# Patient Record
Sex: MALE | Race: WHITE | NOT HISPANIC OR LATINO
[De-identification: names, ages, dates, MRNs, and addresses within clinical notes are randomized per-mention and may not be internally consistent; named-entity substitution may affect disease eponyms.]

---

## 2021-05-25 PROBLEM — Z00.00 ENCOUNTER FOR PREVENTIVE HEALTH EXAMINATION: Status: ACTIVE | Noted: 2021-05-25

## 2022-04-12 ENCOUNTER — TRANSCRIPTION ENCOUNTER (OUTPATIENT)
Age: 72
End: 2022-04-12

## 2022-04-15 ENCOUNTER — INPATIENT (INPATIENT)
Facility: HOSPITAL | Age: 72
LOS: 23 days | Discharge: NURSING, REHAB OR LONG-TERM CARE | DRG: 98 | End: 2022-05-09
Attending: PSYCHIATRY & NEUROLOGY | Admitting: HOSPITALIST
Payer: MEDICARE

## 2022-04-15 VITALS
HEART RATE: 77 BPM | WEIGHT: 171.96 LBS | TEMPERATURE: 98 F | SYSTOLIC BLOOD PRESSURE: 142 MMHG | DIASTOLIC BLOOD PRESSURE: 85 MMHG | OXYGEN SATURATION: 96 % | HEIGHT: 69 IN | RESPIRATION RATE: 18 BRPM

## 2022-04-15 DIAGNOSIS — D72.829 ELEVATED WHITE BLOOD CELL COUNT, UNSPECIFIED: ICD-10-CM

## 2022-04-15 DIAGNOSIS — K86.89 OTHER SPECIFIED DISEASES OF PANCREAS: ICD-10-CM

## 2022-04-15 DIAGNOSIS — Z29.9 ENCOUNTER FOR PROPHYLACTIC MEASURES, UNSPECIFIED: ICD-10-CM

## 2022-04-15 DIAGNOSIS — R29.898 OTHER SYMPTOMS AND SIGNS INVOLVING THE MUSCULOSKELETAL SYSTEM: ICD-10-CM

## 2022-04-15 DIAGNOSIS — R74.01 ELEVATION OF LEVELS OF LIVER TRANSAMINASE LEVELS: ICD-10-CM

## 2022-04-15 DIAGNOSIS — R33.9 RETENTION OF URINE, UNSPECIFIED: ICD-10-CM

## 2022-04-15 LAB
ALBUMIN SERPL ELPH-MCNC: 3.5 G/DL — SIGNIFICANT CHANGE UP (ref 3.3–5)
ALP SERPL-CCNC: 100 U/L — SIGNIFICANT CHANGE UP (ref 40–120)
ALT FLD-CCNC: 70 U/L — HIGH (ref 10–45)
ANION GAP SERPL CALC-SCNC: 13 MMOL/L — SIGNIFICANT CHANGE UP (ref 5–17)
ANISOCYTOSIS BLD QL: SLIGHT — SIGNIFICANT CHANGE UP
APTT BLD: 27.2 SEC — LOW (ref 27.5–35.5)
AST SERPL-CCNC: 82 U/L — HIGH (ref 10–40)
BASOPHILS # BLD AUTO: 0 K/UL — SIGNIFICANT CHANGE UP (ref 0–0.2)
BASOPHILS NFR BLD AUTO: 0 % — SIGNIFICANT CHANGE UP (ref 0–2)
BILIRUB SERPL-MCNC: 0.3 MG/DL — SIGNIFICANT CHANGE UP (ref 0.2–1.2)
BLD GP AB SCN SERPL QL: NEGATIVE — SIGNIFICANT CHANGE UP
BUN SERPL-MCNC: 24 MG/DL — HIGH (ref 7–23)
BURR CELLS BLD QL SMEAR: SLIGHT — SIGNIFICANT CHANGE UP
CALCIUM SERPL-MCNC: 8.9 MG/DL — SIGNIFICANT CHANGE UP (ref 8.4–10.5)
CHLORIDE SERPL-SCNC: 106 MMOL/L — SIGNIFICANT CHANGE UP (ref 96–108)
CO2 SERPL-SCNC: 22 MMOL/L — SIGNIFICANT CHANGE UP (ref 22–31)
CREAT SERPL-MCNC: 1.04 MG/DL — SIGNIFICANT CHANGE UP (ref 0.5–1.3)
EGFR: 77 ML/MIN/1.73M2 — SIGNIFICANT CHANGE UP
EOSINOPHIL # BLD AUTO: 0 K/UL — SIGNIFICANT CHANGE UP (ref 0–0.5)
EOSINOPHIL NFR BLD AUTO: 0 % — SIGNIFICANT CHANGE UP (ref 0–6)
GIANT PLATELETS BLD QL SMEAR: PRESENT — SIGNIFICANT CHANGE UP
GLUCOSE SERPL-MCNC: 160 MG/DL — HIGH (ref 70–99)
HCT VFR BLD CALC: 41.1 % — SIGNIFICANT CHANGE UP (ref 39–50)
HGB BLD-MCNC: 13.4 G/DL — SIGNIFICANT CHANGE UP (ref 13–17)
INR BLD: 1.03 — SIGNIFICANT CHANGE UP (ref 0.88–1.16)
LYMPHOCYTES # BLD AUTO: 1.09 K/UL — SIGNIFICANT CHANGE UP (ref 1–3.3)
LYMPHOCYTES # BLD AUTO: 6.7 % — LOW (ref 13–44)
MACROCYTES BLD QL: SLIGHT — SIGNIFICANT CHANGE UP
MANUAL SMEAR VERIFICATION: SIGNIFICANT CHANGE UP
MCHC RBC-ENTMCNC: 28.2 PG — SIGNIFICANT CHANGE UP (ref 27–34)
MCHC RBC-ENTMCNC: 32.6 GM/DL — SIGNIFICANT CHANGE UP (ref 32–36)
MCV RBC AUTO: 86.5 FL — SIGNIFICANT CHANGE UP (ref 80–100)
MONOCYTES # BLD AUTO: 0 K/UL — SIGNIFICANT CHANGE UP (ref 0–0.9)
MONOCYTES NFR BLD AUTO: 0 % — LOW (ref 2–14)
NEUTROPHILS # BLD AUTO: 15.21 K/UL — HIGH (ref 1.8–7.4)
NEUTROPHILS NFR BLD AUTO: 92.4 % — HIGH (ref 43–77)
NEUTS BAND # BLD: 0.9 % — SIGNIFICANT CHANGE UP (ref 0–8)
OVALOCYTES BLD QL SMEAR: SLIGHT — SIGNIFICANT CHANGE UP
PLAT MORPH BLD: NORMAL — SIGNIFICANT CHANGE UP
PLATELET # BLD AUTO: 299 K/UL — SIGNIFICANT CHANGE UP (ref 150–400)
POIKILOCYTOSIS BLD QL AUTO: SIGNIFICANT CHANGE UP
POTASSIUM SERPL-MCNC: 4.1 MMOL/L — SIGNIFICANT CHANGE UP (ref 3.5–5.3)
POTASSIUM SERPL-SCNC: 4.1 MMOL/L — SIGNIFICANT CHANGE UP (ref 3.5–5.3)
PROT SERPL-MCNC: 6.1 G/DL — SIGNIFICANT CHANGE UP (ref 6–8.3)
PROTHROM AB SERPL-ACNC: 12.3 SEC — SIGNIFICANT CHANGE UP (ref 10.5–13.4)
RBC # BLD: 4.75 M/UL — SIGNIFICANT CHANGE UP (ref 4.2–5.8)
RBC # FLD: 13.2 % — SIGNIFICANT CHANGE UP (ref 10.3–14.5)
RBC BLD AUTO: ABNORMAL
RH IG SCN BLD-IMP: NEGATIVE — SIGNIFICANT CHANGE UP
SMUDGE CELLS # BLD: PRESENT — SIGNIFICANT CHANGE UP
SODIUM SERPL-SCNC: 141 MMOL/L — SIGNIFICANT CHANGE UP (ref 135–145)
SPHEROCYTES BLD QL SMEAR: SLIGHT — SIGNIFICANT CHANGE UP
WBC # BLD: 16.3 K/UL — HIGH (ref 3.8–10.5)
WBC # FLD AUTO: 16.3 K/UL — HIGH (ref 3.8–10.5)

## 2022-04-15 PROCEDURE — 99233 SBSQ HOSP IP/OBS HIGH 50: CPT

## 2022-04-15 PROCEDURE — 99223 1ST HOSP IP/OBS HIGH 75: CPT | Mod: GC

## 2022-04-15 RX ORDER — ACETAMINOPHEN 500 MG
650 TABLET ORAL EVERY 6 HOURS
Refills: 0 | Status: DISCONTINUED | OUTPATIENT
Start: 2022-04-15 | End: 2022-05-09

## 2022-04-15 RX ORDER — SENNA PLUS 8.6 MG/1
1 TABLET ORAL AT BEDTIME
Refills: 0 | Status: DISCONTINUED | OUTPATIENT
Start: 2022-04-15 | End: 2022-05-09

## 2022-04-15 RX ORDER — TAMSULOSIN HYDROCHLORIDE 0.4 MG/1
0.4 CAPSULE ORAL AT BEDTIME
Refills: 0 | Status: DISCONTINUED | OUTPATIENT
Start: 2022-04-15 | End: 2022-05-09

## 2022-04-15 RX ORDER — ENOXAPARIN SODIUM 100 MG/ML
40 INJECTION SUBCUTANEOUS EVERY 24 HOURS
Refills: 0 | Status: DISCONTINUED | OUTPATIENT
Start: 2022-04-15 | End: 2022-05-09

## 2022-04-15 RX ORDER — POLYETHYLENE GLYCOL 3350 17 G/17G
17 POWDER, FOR SOLUTION ORAL DAILY
Refills: 0 | Status: DISCONTINUED | OUTPATIENT
Start: 2022-04-15 | End: 2022-05-09

## 2022-04-15 RX ADMIN — SENNA PLUS 1 TABLET(S): 8.6 TABLET ORAL at 22:56

## 2022-04-15 RX ADMIN — ENOXAPARIN SODIUM 40 MILLIGRAM(S): 100 INJECTION SUBCUTANEOUS at 22:53

## 2022-04-15 RX ADMIN — POLYETHYLENE GLYCOL 3350 17 GRAM(S): 17 POWDER, FOR SOLUTION ORAL at 22:56

## 2022-04-15 RX ADMIN — TAMSULOSIN HYDROCHLORIDE 0.4 MILLIGRAM(S): 0.4 CAPSULE ORAL at 22:54

## 2022-04-15 NOTE — CONSULT NOTE ADULT - SUBJECTIVE AND OBJECTIVE BOX
**NEUROLOGY CONSULT NOTE** incomplete    HPI: 71 M with no past medical history, not on any medications, transferred here from [facility] for bilateral lower extremity weakness, with concern for Transverse Myelitis vs Guillan [place], for plasma exchange. Patient's story dates back to week and a half ago, when he went to [place] for a vacation, developed nausea, vomiting, diarrhea and weakness, accompanied by abd distension and difficulty urinating for which he went to [facility]. There, he was found to be in urosepsis, complicated by urinary obstruction for which Marie Catheter was placed. On day prior to planned discharge 4/12, he started to develop severe paresthesia, numbness, and weakness of bilateral lower extremities. He underwent abdominal imaging and MRI of spine at [facility], and was found to have pancreatic lesion at pancreatic head, and MRI thoracic spine consistent with transverse myelitis. He then was given high dose steroids for three days, which provided minimal improvement of symptoms. As a result, patient transferred to [facility] for possible plasma exchange.   Of note-patient travelled to [place] and [place] in November, for which he received a series of immunizations that he does not recall.     Currently, he is endorsing weakness, tingling, and numbness of his bilateral lower extremities up until the point of his umbilicus. He is denying any other symptoms of his UE, notes no weakness, paresthesias, tingling. Currently only complaint is abdominal pain and constipation.      Relevant imaging/studies from [facility]:  CT AP: Ill defined low density lesion within pancreatic head represent a benign or malignant neoplasm. Measures 2.2 x 2.4 x 3.1 cm which may represent a benign or malignant neoplasm, pancreatic lesion partially compresses the portal vein and superior aspect of SMV. Bowel: Mild diffuse wall thickening of the ascending transverse and upper half of the descending colon which may relate to possible mild non specific colitis.   MRI Thoracic:  Borderline abn signal within the central cord substance throughout entire length of thoracic spine could reflect transverse myelitis.   ESR- 30 CRP- 14.2  CSF total protein 80, CSF PCR negative, others pending    T(C): 36.6 (04-15-22 @ 19:15), Max: 36.6 (04-15-22 @ 19:15)  HR: 77 (04-15-22 @ 19:15) (77 - 77)  BP: 142/85 (04-15-22 @ 19:15) (142/85 - 142/85)  RR: 18 (04-15-22 @ 19:15) (18 - 18)  SpO2: 96% (04-15-22 @ 19:15) (96% - 96%)    PAST MEDICAL & SURGICAL HISTORY:      FAMILY HISTORY:  Family history of breast cancer in mother (Sibling)    FH: multiple myeloma (Mother)        SOCIAL HISTORY:   Patient lives with *** at ***.   Smoking status:  Drinking:  Drug Use:     ROS: ***  Constitutional: No fever, weight loss or fatigue  Eyes: No eye pain, visual disturbances, or discharge  ENMT:  No difficulty hearing, tinnitus; No sinus or throat pain  Neck: No pain or stiffness  Respiratory: No cough, wheezing, chills or hemoptysis  Cardiovascular: No chest pain, palpitations, shortness of breath, or leg swelling  Gastrointestinal: No abdominal pain. No nausea, vomiting or hematemesis; No diarrhea or constipation. Nohematochezia.  Genitourinary: No dysuria, frequency, hematuria or incontinence  Neurological: As per HPI  Skin: No itching, burning, rashes or lesions   Endocrine: No heat or cold intolerance; No hair loss  Musculoskeletal: No joint pain or swelling; No muscle, back or extremity pain  Heme/Lymph: No easy bruising or bleeding gums    MEDICATIONS  (STANDING):  enoxaparin Injectable 40 milliGRAM(s) SubCutaneous every 24 hours  polyethylene glycol 3350 17 Gram(s) Oral daily  senna 1 Tablet(s) Oral at bedtime  tamsulosin 0.4 milliGRAM(s) Oral at bedtime    MEDICATIONS  (PRN):  acetaminophen     Tablet .. 650 milliGRAM(s) Oral every 6 hours PRN Temp greater or equal to 38C (100.4F), Mild Pain (1 - 3)    Allergies    No Known Allergies    Intolerances      Vital Signs Last 24 Hrs  T(C): 36.6 (15 Apr 2022 19:15), Max: 36.6 (15 Apr 2022 19:15)  T(F): 97.8 (15 Apr 2022 19:15), Max: 97.8 (15 Apr 2022 19:15)  HR: 77 (15 Apr 2022 19:15) (77 - 77)  BP: 142/85 (15 Apr 2022 19:15) (142/85 - 142/85)  BP(mean): --  RR: 18 (15 Apr 2022 19:15) (18 - 18)  SpO2: 96% (15 Apr 2022 19:15) (96% - 96%)    Physical exam:  Constitutional: No acute distress, conversant  Eyes: Anicteric sclerae, moist conjunctivae, see below for CNs  Neck: trachea midline, FROM, supple, no thyromegaly or lymphadenopathy  Cardiovascular: Regular rate and rhythm, no murmurs, rubs, or gallops. No carotid bruits.   Pulmonary: Anterior breath sounds clear bilaterally, no crackles or wheezing. No use of accessory muscles  GI: tender to palpation in luq-distended.  Extremities: Radial and DP pulses +2, no edema    Neurologic:  -Mental status: Awake, alert, oriented to person, place, and time. Speech is fluent with intact naming, repetition, and comprehension, no dysarthria. Recent and remote memory intact. Follows commands. Attention/concentration intact. Fund of knowledge appropriate.  -Cranial nerves:   II: Visual fields are full to confrontation.  III, IV, VI: Extraocular movements are intact without nystagmus. Pupils equally round and reactive to light  V:  Facial sensation V1-V3 equal and intact   VII: Face is symmetric with normal eye closure and smile  VIII: Hearing is grossly intact  IX, X: Uvula is midline and soft palate rises symmetrically  XI: Head turning and shoulder shrug are intact.  XII: Tongue protrudes midline  Motor: Normal bulk and tone. No pronator drift. Strength bilateral upper extremity 5/5, bilateral lower extremities 5/5.  Rapid alternating movements intact and symmetric  Sensation: Intact to light touch bilaterally. No neglect or extinction on double simultaneous testing.  Coordination: No dysmetria on finger-to-nose and heel-to-shin bilaterally  Reflexes: Downgoing toes bilaterally   Gait: Narrow gait and steady    NIHSS: **** ASPECT Score: ***** ICH Score: ****** (GCS)    Fingerstick Blood Glucose: CAPILLARY BLOOD GLUCOSE        LABS:                        13.4   16.30 )-----------( 299      ( 15 Apr 2022 20:04 )             41.1     04-15    141  |  106  |  24<H>  ----------------------------<  160<H>  4.1   |  22  |  1.04    Ca    8.9      15 Apr 2022 20:04    TPro  6.1  /  Alb  3.5  /  TBili  0.3  /  DBili  x   /  AST  82<H>  /  ALT  70<H>  /  AlkPhos  100  04-15    PT/INR - ( 15 Apr 2022 20:04 )   PT: 12.3 sec;   INR: 1.03          PTT - ( 15 Apr 2022 20:04 )  PTT:27.2 sec          RADIOLOGY & ADDITIONAL STUDIES:      -----------------------------------------------------------------------------------------------------------------  IV-tPA (Y/N):    ***                              Bolus time:    Alteplase Dose Verification w/ RN:  Reason IV-tPA not given: ***    **NEUROLOGY CONSULT NOTE**    Of note: history obtained by patient is inconsistent with history in chart from [facility]    HPI: 71 M with no past medical history, not on any medications, transferred here from [facility] for bilateral lower extremity weakness, with concern for Transverse Myelitis vs Guillan [place], for plasma exchange. Patient states he was in [place] for vacation on 4/6 and developed "food poisoning" symptoms like nausea, vomiting, diarrhea, generalized fatigue. Pt did not seek medical care during this time, wanted to recuperate at home. By 4/10 patient felt okay. On 4/11, pt states he developed abdominal distension and difficulty urinating for which he went to [facility] to get evaluated. Pt was found to be in urosepsis c/b urinary obstruction for which nation catheter was placed. Pt admitted for observation on 4/12, was planned for discharge on that day but started to develop numbness in both feet early AM. Initially patient stated his weakness started on 4/15, however, when asked again states weakness started on 4/13. Neurology (Dr. Stout) was consulted on 4/13 as pt could not lift legs off the bed. At that time, LE were 2/5 in strength, hyperreflexic in b/l LE, no sensation at T10 level. STAT MRI T and L +/- contrast obtained on 4/13 and demonstrated transverse myelitis in thoracic spine, was subsequently started on high dose steroids. LP performed on 4/13 as well (see results below). On 4/14 pt was unable to ambulate, worsened leg weakness and paresthesias noted in chart. On 4/15, patient showed mild improvement in receding sensory level from T10 - L1-2 and in leg strength per neurology note. As pt had minimal improvement of symptoms s/p 3 days of high dose steroids, pt transferred to [facility] for possible plasma exchange.     Of note - Pt underwent CT abdomen and Chest - found to have pancreatic lesion at pancreatic head. Also found to have enhancing lesion in left sacroiliac junction s/f possible metastatic lesion. Patient also travelled to [place] and [place] in November - received series of immunizations that he does not recall.     Currently, pt is endorsing weakness, tingling, and numbness of his bilateral lower extremities up until the point of his umbilicus. He is denying any other symptoms of his UE, notes no weakness, paresthesias, tingling. Currently only complaint is abdominal pain and constipation.     Relevant imaging/studies from [facility]:  CT AP: Ill defined low density lesion within pancreatic head represent a benign or malignant neoplasm. Measures 2.2 x 2.4 x 3.1 cm which may represent a benign or malignant neoplasm, pancreatic lesion partially compresses the portal vein and superior aspect of SMV. Bowel: Mild diffuse wall thickening of the ascending transverse and upper half of the descending colon which may relate to possible mild non specific colitis.   MRI Thoracic:  Borderline abn signal within the central cord substance throughout entire length of thoracic spine could reflect transverse myelitis.   ESR- 30 CRP- 14.2  CSF Studies - .9, RBC 5.6, total cells 100, 3% Neutrophils, 82% Lymphocytes, 11% monocytes, total protein 80, glucose 56, negative gram stain others pending    T(C): 36.6 (04-15-22 @ 19:15), Max: 36.6 (04-15-22 @ 19:15)  HR: 77 (04-15-22 @ 19:15) (77 - 77)  BP: 142/85 (04-15-22 @ 19:15) (142/85 - 142/85)  RR: 18 (04-15-22 @ 19:15) (18 - 18)  SpO2: 96% (04-15-22 @ 19:15) (96% - 96%)    PAST MEDICAL & SURGICAL HISTORY:      FAMILY HISTORY:  Family history of breast cancer in mother (Sibling)    FH: multiple myeloma (Mother)    SOCIAL HISTORY:   Patient lives with family  Smoking status: lifetime nonsmoker  Drinking: occasional use  Drug Use: denies    ROS:   Constitutional: No fever, weight loss or fatigue  Eyes: No eye pain, visual disturbances, or discharge  ENMT:  No difficulty hearing, tinnitus; No sinus or throat pain  Neck: No pain or stiffness  Respiratory: No cough, wheezing, chills or hemoptysis  Cardiovascular: No chest pain, palpitations, shortness of breath, or leg swelling  Gastrointestinal: No nausea, vomiting or hematemesis; No diarrhea. Nohematochezia.  Genitourinary: + incontinence, nation in place  Neurological: As per HPI    MEDICATIONS  (STANDING):  enoxaparin Injectable 40 milliGRAM(s) SubCutaneous every 24 hours  polyethylene glycol 3350 17 Gram(s) Oral daily  senna 1 Tablet(s) Oral at bedtime  tamsulosin 0.4 milliGRAM(s) Oral at bedtime    MEDICATIONS  (PRN):  acetaminophen     Tablet .. 650 milliGRAM(s) Oral every 6 hours PRN Temp greater or equal to 38C (100.4F), Mild Pain (1 - 3)    Allergies    No Known Allergies    Intolerances      Vital Signs Last 24 Hrs  T(C): 36.6 (15 Apr 2022 19:15), Max: 36.6 (15 Apr 2022 19:15)  T(F): 97.8 (15 Apr 2022 19:15), Max: 97.8 (15 Apr 2022 19:15)  HR: 77 (15 Apr 2022 19:15) (77 - 77)  BP: 142/85 (15 Apr 2022 19:15) (142/85 - 142/85)  BP(mean): --  RR: 18 (15 Apr 2022 19:15) (18 - 18)  SpO2: 96% (15 Apr 2022 19:15) (96% - 96%)    Physical exam:  Constitutional: No acute distress, conversant  Eyes: Anicteric sclerae, moist conjunctivae, see below for CNs  Neck: trachea midline, FROM, supple, no thyromegaly or lymphadenopathy  Cardiovascular: Regular rate and rhythm, no murmurs, rubs, or gallops. No carotid bruits.   Pulmonary: Anterior breath sounds clear bilaterally, no crackles or wheezing. No use of accessory muscles  GI: tender to palpation in LUQ, distended  Extremities: Radial and DP pulses +2, no edema    Neurologic:  -Mental status: Awake, alert, oriented to person, place, and time. Speech is fluent with intact naming, repetition, and comprehension, no dysarthria. Recent and remote memory intact. Follows commands. Attention/concentration intact. Fund of knowledge appropriate.  -Cranial nerves:   II: Visual fields are full to confrontation.  III, IV, VI: Extraocular movements are intact without nystagmus. Pupils equally round and reactive to light  V:  Facial sensation V1-V3 equal and intact   VII: Face is symmetric with normal eye closure and smile  VIII: Hearing is grossly intact  IX, X: Uvula is midline and soft palate rises symmetrically  XI: Head turning and shoulder shrug are intact.  XII: Tongue protrudes midline  Motor: No pronator drift. Strength bilateral upper extremity 5/5. Left hip flexor/extensor, knee flexor/extensor & ankle dorsiflexion 1/5, plantar flexion 2-/5, occasional twitching. Right hip flexor/extensor, knee flexor/extensor & ankle dorsiflexion 1-/5, plantar flexion 1/5  Rapid alternating movements intact and symmetric in b/l UE  Sensation: Intact to light touch bilaterally on UE. Vibration and sensation to pinprick intact in b/l UE. Decreased sensation starting at T9 level, almost no sensation at T11 level to light touch. Right LE 0% sensation to light touch, no sensation to pinprick nor vibration. Left LE 1-3% sensation to light touch (compared to UE), dull sensation to pinprick, none to vibration.   Coordination: No dysmetria on finger-to-nose b/l, unable to perform heel-to-shin bilaterally 2/2 weakness  Reflexes: + Babinksi bilaterally. Reflexes in b/l LE 3/4 throughout, UE 2/4.     Fingerstick Blood Glucose: CAPILLARY BLOOD GLUCOSE        LABS:                        13.4   16.30 )-----------( 299      ( 15 Apr 2022 20:04 )             41.1     04-15    141  |  106  |  24<H>  ----------------------------<  160<H>  4.1   |  22  |  1.04    Ca    8.9      15 Apr 2022 20:04    TPro  6.1  /  Alb  3.5  /  TBili  0.3  /  DBili  x   /  AST  82<H>  /  ALT  70<H>  /  AlkPhos  100  04-15    PT/INR - ( 15 Apr 2022 20:04 )   PT: 12.3 sec;   INR: 1.03          PTT - ( 15 Apr 2022 20:04 )  PTT:27.2 sec      RADIOLOGY & ADDITIONAL STUDIES:      **NEUROLOGY CONSULT NOTE**    Of note: history obtained by patient is inconsistent with history in chart from [facility]    HPI: 71 M with no past medical history, not on any medications, transferred here from [facility] for bilateral lower extremity weakness, with concern for Transverse Myelitis vs Guillan [place], for plasma exchange. Patient states he was in [place] for vacation on 4/6 and developed "food poisoning" symptoms like nausea, vomiting, diarrhea, generalized fatigue. Pt did not seek medical care during this time, wanted to recuperate at home. By 4/10 patient felt okay. On 4/11, pt states he developed abdominal distension and difficulty urinating for which he went to [facility] to get evaluated. Pt was found to be in urosepsis c/b urinary obstruction for which nation catheter was placed. Pt admitted for observation on 4/12, was planned for discharge on that day but started to develop numbness in both feet early AM. Initially patient stated his weakness started on 4/15, however, when asked again states weakness started on 4/13. Neurology (Dr. Stout) was consulted on 4/13 as pt could not lift legs off the bed. At that time, LE were 2/5 in strength, hyperreflexic in b/l LE, no sensation at T10 level. STAT MRI T and L +/- contrast obtained on 4/13 and demonstrated transverse myelitis in thoracic spine, was subsequently started on high dose steroids. LP performed on 4/13 as well (see results below). On 4/14 pt was unable to ambulate, worsened leg weakness and paresthesias noted in chart. On 4/15, patient showed mild improvement in receding sensory level from T10 - L1-2 and in leg strength per neurology note. As pt had minimal improvement of symptoms s/p 3 days of high dose steroids, pt transferred to [facility] for possible plasma exchange.     Of note - Pt underwent CT abdomen and Chest - found to have pancreatic lesion at pancreatic head. Also found to have enhancing lesion in left sacroiliac junction s/f possible metastatic lesion. Patient also travelled to [place] and [place] in November - received series of immunizations that he does not recall.     Currently, pt is endorsing weakness, tingling, and numbness of his bilateral lower extremities up until the point of his umbilicus. He is denying any other symptoms of his UE, notes no weakness, paresthesias, tingling. Currently only complaint is abdominal pain and constipation.     Relevant imaging/studies from [facility]:  CT AP: Ill defined low density lesion within pancreatic head represent a benign or malignant neoplasm. Measures 2.2 x 2.4 x 3.1 cm which may represent a benign or malignant neoplasm, pancreatic lesion partially compresses the portal vein and superior aspect of SMV. Bowel: Mild diffuse wall thickening of the ascending transverse and upper half of the descending colon which may relate to possible mild non specific colitis.   MRI Thoracic:  Borderline abn signal within the central cord substance throughout entire length of thoracic spine could reflect transverse myelitis.   ESR- 30 CRP- 14.2  CSF Studies - .9, RBC 5.6, total cells 100, 3% Neutrophils, 82% Lymphocytes, 11% monocytes, total protein 80, glucose 56, negative gram stain others pending    T(C): 36.6 (04-15-22 @ 19:15), Max: 36.6 (04-15-22 @ 19:15)  HR: 77 (04-15-22 @ 19:15) (77 - 77)  BP: 142/85 (04-15-22 @ 19:15) (142/85 - 142/85)  RR: 18 (04-15-22 @ 19:15) (18 - 18)  SpO2: 96% (04-15-22 @ 19:15) (96% - 96%)    PAST MEDICAL & SURGICAL HISTORY:      FAMILY HISTORY:  Family history of breast cancer in mother (Sibling)    FH: multiple myeloma (Mother)    SOCIAL HISTORY:   Patient lives with family  Smoking status: lifetime nonsmoker  Drinking: occasional use  Drug Use: denies    ROS:   Constitutional: No fever, weight loss or fatigue  Eyes: No eye pain, visual disturbances, or discharge  ENMT:  No difficulty hearing, tinnitus; No sinus or throat pain  Neck: No pain or stiffness  Respiratory: No cough, wheezing, chills or hemoptysis  Cardiovascular: No chest pain, palpitations, shortness of breath, or leg swelling  Gastrointestinal: No nausea, vomiting or hematemesis; No diarrhea. Nohematochezia.  Genitourinary: + incontinence, nation in place  Neurological: As per HPI    MEDICATIONS  (STANDING):  enoxaparin Injectable 40 milliGRAM(s) SubCutaneous every 24 hours  polyethylene glycol 3350 17 Gram(s) Oral daily  senna 1 Tablet(s) Oral at bedtime  tamsulosin 0.4 milliGRAM(s) Oral at bedtime    MEDICATIONS  (PRN):  acetaminophen     Tablet .. 650 milliGRAM(s) Oral every 6 hours PRN Temp greater or equal to 38C (100.4F), Mild Pain (1 - 3)    Allergies    No Known Allergies    Intolerances      Vital Signs Last 24 Hrs  T(C): 36.6 (15 Apr 2022 19:15), Max: 36.6 (15 Apr 2022 19:15)  T(F): 97.8 (15 Apr 2022 19:15), Max: 97.8 (15 Apr 2022 19:15)  HR: 77 (15 Apr 2022 19:15) (77 - 77)  BP: 142/85 (15 Apr 2022 19:15) (142/85 - 142/85)  BP(mean): --  RR: 18 (15 Apr 2022 19:15) (18 - 18)  SpO2: 96% (15 Apr 2022 19:15) (96% - 96%)    Physical exam:  Constitutional: No acute distress, conversant  Eyes: Anicteric sclerae, moist conjunctivae, see below for CNs  Neck: trachea midline, FROM, supple, no thyromegaly or lymphadenopathy  Cardiovascular: Regular rate and rhythm, no murmurs, rubs, or gallops. No carotid bruits.   Pulmonary: Anterior breath sounds clear bilaterally, no crackles or wheezing. No use of accessory muscles  GI: tender to palpation in LUQ, distended  Extremities: Radial and DP pulses +2, no edema    Neurologic:  -Mental status: Awake, alert, oriented to person, place, and time. Speech is fluent with intact naming, repetition, and comprehension, no dysarthria. Recent and remote memory intact. Follows commands. Attention/concentration intact. Fund of knowledge appropriate.  -Cranial nerves:   II: Visual fields are full to confrontation.  III, IV, VI: Extraocular movements are intact without nystagmus. Pupils equally round and reactive to light  V:  Facial sensation V1-V3 equal and intact   VII: Face is symmetric with normal eye closure and smile  VIII: Hearing is grossly intact  IX, X: Uvula is midline and soft palate rises symmetrically  XI: Head turning and shoulder shrug are intact.  XII: Tongue protrudes midline  Motor: No pronator drift. Strength bilateral upper extremity 5/5. Left hip flexor/extensor, knee flexor/extensor & ankle dorsiflexion 1/5, plantar flexion 2-/5, occasional twitching. Right hip flexor/extensor, knee flexor/extensor & ankle dorsiflexion 1-/5, plantar flexion 1/5  Rapid alternating movements intact and symmetric in b/l UE  Sensation: Intact to light touch bilaterally on UE. Vibration and sensation to pinprick intact in b/l UE. Decreased sensation starting at T10/11 level. Right LE 0% sensation to light touch, no sensation to pinprick nor vibration. Left LE 1-3% sensation to light touch (compared to UE), dull sensation to pinprick, none to vibration.   Coordination: Mild dysmetria on finger-to-nose b/l; unable to perform heel-to-shin bilaterally 2/2 weakness  Reflexes: + Babinksi bilaterally. Reflexes in b/l LE 3+ throughout, UE 3+ with + Diaz's sign     Fingerstick Blood Glucose: CAPILLARY BLOOD GLUCOSE        LABS:                        13.4   16.30 )-----------( 299      ( 15 Apr 2022 20:04 )             41.1     04-15    141  |  106  |  24<H>  ----------------------------<  160<H>  4.1   |  22  |  1.04    Ca    8.9      15 Apr 2022 20:04    TPro  6.1  /  Alb  3.5  /  TBili  0.3  /  DBili  x   /  AST  82<H>  /  ALT  70<H>  /  AlkPhos  100  04-15    PT/INR - ( 15 Apr 2022 20:04 )   PT: 12.3 sec;   INR: 1.03          PTT - ( 15 Apr 2022 20:04 )  PTT:27.2 sec      RADIOLOGY & ADDITIONAL STUDIES:

## 2022-04-15 NOTE — H&P ADULT - ATTENDING COMMENTS
#LE weakness: s/p MRI spine and LP at [facility] with findings c/f transverse myelitis s/p steroids. Paresthesia/weakness extends to umbilicus- Per pt these symptoms have not progressed for 4 days and feels improved. Neuro following, unclear etiology- TM vs infarct vs paraneoplastic vs GB. F/up repeat MRI, hold steroids for now per neuro- c/w rmf neuro checks, collateral from [facility]. If symptoms progress/worsening neuro exam- stat neuro eval and consider icu consult for further monitoring.     #Pancreatic mass: Collateral from [facility]. No abd pain/tenderness, mild transaminitis. Consider mri abdomen/GI eval

## 2022-04-15 NOTE — H&P ADULT - NSHPPHYSICALEXAM_GEN_ALL_CORE
.  VITAL SIGNS:  T(C): 36.6 (04-15-22 @ 19:15), Max: 36.6 (04-15-22 @ 19:15)  T(F): 97.8 (04-15-22 @ 19:15), Max: 97.8 (04-15-22 @ 19:15)  HR: 77 (04-15-22 @ 19:15) (77 - 77)  BP: 142/85 (04-15-22 @ 19:15) (142/85 - 142/85)  BP(mean): --  RR: 18 (04-15-22 @ 19:15) (18 - 18)  SpO2: 96% (04-15-22 @ 19:15) (96% - 96%)  Wt(kg): --    PHYSICAL EXAM:    Head: NC/AT  Eyes: PERRL, EOMI, clear conjunctiva  ENT: no nasal discharge; uvula midline, no oropharyngeal erythema or exudates; MMM  Neck: supple; no JVD or thyromegaly  Respiratory: CTA B/L; no W/R/R, no retractions  Cardiac: +S1/S2; RRR; no M/R/G; PMI non-displaced  Gastrointestinal:  tender to palpation in luq-distended.  Extremities: WWP, no clubbing or cyanosis; no peripheral edema  Vascular: 2+ radial, femoral, DP/PT pulses B/L  Dermatologic: skin warm, dry and intact; no rashes, wounds, or scars  Lymphatic: no submandibular or cervical LAD  Neurologic: AAOx3; CNII-XII grossly intact; LE strength 0/5, sensation in tact L > R. UE strength 5/5, sensation intact bilaterally.   Psychiatric: affect and characteristics of appearance, verbalizations, behaviors are appropriate

## 2022-04-15 NOTE — H&P ADULT - PROBLEM SELECTOR PLAN 5
Patient initially to [facility] with urosepsis c/b urinary retention, likely 2/2 neurological process. Marie placed at [facility].   -continue with flomax 0.4 QHS  -neuro workup as above.

## 2022-04-15 NOTE — CONSULT NOTE ADULT - ASSESSMENT
71 M with no past medical history, not on any medications, transferred here from [facility] for bilateral lower extremity weakness, with concern for transverse myelitis for plasma exchange.     Transverse myelitis demonstrated throughout thoracic spine on MRI from [facility]. Will need to r/o metastatic lesion to spinal cord (although rare), r/o spinal cord infarct.     Recommend:  - Repeat urgent MRI thoracic and lumbar spine with contrast, add diffusion sequence to r/o spinal cord infarct.  - Decision for further treatment with high dose steroids to be confirmed tomorrow with Dr. Corbett  - hold on plan for plasma exchange for now  - start q8h general neuro checks and vitals   - follow up CSF studies from [facility]  - further recs tomorrow AM     Discussed case with Neurology Attending Dr. Corbett 71 M with no past medical history, not on any medications, transferred here from [facility] for bilateral lower extremity weakness, with concern for transverse myelitis for plasma exchange.     Transverse myelitis demonstrated throughout thoracic spine on MRI from [facility]. Will need to r/o metastatic lesion to spinal cord (although rare), r/o spinal cord infarct.     Recommend:  - MRI brain and C spine with contrast   - Decision for further treatment with high dose steroids to be confirmed tomorrow with Dr. Corbett  - hold on plan for plasma exchange for now  - start q8h general neuro checks and vitals   - follow up CSF studies from [facility]  - further recs tomorrow AM     Discussed case with Neurology Attending Dr. Corbett

## 2022-04-15 NOTE — H&P ADULT - PROBLEM SELECTOR PLAN 1
Given history of diarrhea two weeks prior to LE weakness, ddx includes GBS versus transverse myelitis-imaging consistent with transverse myelitis. Lumbar tap done at [facility]-for which studies pending. There, ESR and CRP elevated. Patient finished three days of high dose steroids with minimal improvement in symptoms.   -neuro consulted-follow recs-possible PLEX?  -neuro check q4  -will do bedside swallow  -respiratory status stable  -follow autoimmune workup Given history of diarrhea two weeks prior to LE weakness, ddx includes GBS versus transverse myelitis-imaging consistent with transverse myelitis. Lumbar tap done at [facility]-for which studies pending. There, ESR and CRP elevated. Patient finished three days of high dose steroids with minimal improvement in symptoms.   -neuro consulted-follow recs  -willh old off on steroids until evaluation by neurology tomorrow AM  -lumbar tap done at [facility]-will follow up results  -neuro check q8  -will do bedside swallow   -follow autoimmune workup Given history of diarrhea two weeks prior to LE weakness, ddx includes GBS versus transverse myelitis-imaging consistent with transverse myelitis. Lumbar tap done at [facility]-for which studies pending. There, ESR and CRP elevated. Patient finished three days of high dose steroids with minimal improvement in symptoms.   -neuro consulted-follow recs  -willh old off on steroids until evaluation by neurology tomorrow AM  -lumbar tap done at [facility]-will follow up results  -neuro check q8  -follow up repeat MRI  -follow up AI workup

## 2022-04-15 NOTE — H&P ADULT - PROBLEM SELECTOR PLAN 3
Patient initially to [facility] with urosepsis c/b urinary retention, likely 2/2 neurological process. Marie placed at [facility].   -continue with flomax 0.4 QHS  -neuro workup as above. Presented with WBC of 16, elevated at [facility] as well. Could be 2/2 infection-history of UTI at [facility] treated with 5 day course of CTX, denying any complaints. No cough, no persistent GI symptoms, no diarrhea, no vomiting. Could be 2/2 reactive process given neurological symptomology.   -follow UA  -CXR at [facility] clear without infiltrates  -Patient currently constipated-unlikely to be GI source. Presented with WBC of 16, elevated at [facility] as well. Could be 2/2 infection-history of UTI at [facility] treated with 5 day course of CTX, denying any complaints. No cough, no persistent GI symptoms, no diarrhea, no vomiting. Could be 2/2 reactive process given neurological symptomology. Could also be 2/2 prednisone.   -follow UA  -CXR at [facility] clear without infiltrates  -Patient currently constipated-unlikely to be GI source.

## 2022-04-15 NOTE — H&P ADULT - HISTORY OF PRESENT ILLNESS
71 M with no past medical history, not on any medications, transferred here from [facility] for bilateral lower extremity weakness, with concern for Transverse Myelitis vs Guillan [place], for plasma exchange. Patient's story dates back to week and a half ago, when he went to [place] for a vacation, developed nausea, vomiting, diarrhea and weakness, accompanied by abd distension and difficulty urinating for which he went to [facility]. There, he was found to be in urosepsis, complicated by urinary obstruction for which Marie Catheter was placed. On day prior to planned discharge 4/12, he started to develop severe paresthesia, numbness, and weakness of bilateral lower extremities. He underwent abdominal imaging and MRI of spine at [facility], and was found to have pancreatic lesion at pancreatic head, and MRI thoracic spine consistent with transverse myelitis. He then was given high dose steroids for three days, which provided minimal improvement of symptoms. As a result, patient transferred to [facility] for possible plasma exchange.   Of note-patient travelled to [place] and [place] in November, for which he received a series of immunizations that he does not recall.     Currently, he is endorsing weakness, tingling, and numbness of his bilateral lower extremities up until the point of his umbilicus. He is denying any other symptoms of his UE, notes no weakness, paresthesias, tingling. Currently only complaint is abdominal pain and constipation.      Relevant imaging/studies from [facility]:  CT AP: Ill defined low density lesion within pancreatic head represent a benign or malignant neoplasm. Measures 2.2 x 2.4 x 3.1 cm which may represent a benign or malignant neoplasm, pancreatic lesion partially compresses the portal vein and superior aspect of SMV. Bowel: Mild diffuse wall thickening of the ascending transverse and upper half of the descending colon which may relate to possible mild non specific colitis.   MRI Thoracic:  Borderline abn signal within the central cord substance throughout entire length of thoracic spine could reflect transverse myelitis.   ESR- 30 CRP- 14.2  CSF total protein 80, CSF PCR negative, others pending

## 2022-04-15 NOTE — H&P ADULT - PROBLEM SELECTOR PLAN 4
F-none  E-replete PRN  N-pending dysphagia  DVT-lovenox 40SQ Elevation in AST ALT, could be 2/2 pancreatic mass versus autoimmune etiology. No significant drinking or drug history. No tenderness on exam, no organomegaly.   -follow RUQ US  -follow malignancy workup as above.

## 2022-04-15 NOTE — H&P ADULT - PROBLEM SELECTOR PLAN 2
CT AP 2.3 x 2.4 x 3.1 at pancreatic head, as per records, workup initiated at [facility]; however, unsure of results.  -will re order CA 19-9 and CEA  -can consider MRCP once neurologically stable

## 2022-04-15 NOTE — PATIENT PROFILE ADULT - FALL HARM RISK - HARM RISK INTERVENTIONS
Assistance with ambulation/Assistance OOB with selected safe patient handling equipment/Communicate Risk of Fall with Harm to all staff/Discuss with provider need for PT consult/Monitor gait and stability/Reinforce activity limits and safety measures with patient and family/Tailored Fall Risk Interventions/Visual Cue: Yellow wristband and red socks/Bed in lowest position, wheels locked, appropriate side rails in place/Call bell, personal items and telephone in reach/Instruct patient to call for assistance before getting out of bed or chair/Non-slip footwear when patient is out of bed/[place] to call system/Physically safe environment - no spills, clutter or unnecessary equipment/Purposeful Proactive Rounding/Room/bathroom lighting operational, light cord in reach

## 2022-04-15 NOTE — H&P ADULT - NSHPLABSRESULTS_GEN_ALL_CORE
.  LABS:                         13.4   16.30 )-----------( 299      ( 15 Apr 2022 20:04 )             41.1     04-15    141  |  106  |  24<H>  ----------------------------<  160<H>  4.1   |  22  |  1.04    Ca    8.9      15 Apr 2022 20:04    TPro  6.1  /  Alb  3.5  /  TBili  0.3  /  DBili  x   /  AST  82<H>  /  ALT  70<H>  /  AlkPhos  100  04-15    PT/INR - ( 15 Apr 2022 20:04 )   PT: 12.3 sec;   INR: 1.03          PTT - ( 15 Apr 2022 20:04 )  PTT:27.2 sec              RADIOLOGY, EKG & ADDITIONAL TESTS: Reviewed.

## 2022-04-15 NOTE — H&P ADULT - ASSESSMENT
71 M with no past medical history, not on any medications, transferred here from [facility] for bilateral lower extremity weakness, with concern for Transverse Myelitis vs Guillan [place], for plasma exchange.

## 2022-04-15 NOTE — H&P ADULT - NSICDXFAMILYHX_GEN_ALL_CORE_FT
FAMILY HISTORY:  Mother  Still living? Unknown  FH: multiple myeloma, Age at diagnosis: Age Unknown    Sibling  Still living? Unknown  Family history of breast cancer in mother, Age at diagnosis: Age Unknown

## 2022-04-16 LAB
AFP-TM SERPL-MCNC: <1.8 NG/ML — SIGNIFICANT CHANGE UP
ALBUMIN SERPL ELPH-MCNC: 3.3 G/DL — SIGNIFICANT CHANGE UP (ref 3.3–5)
ALP SERPL-CCNC: 77 U/L — SIGNIFICANT CHANGE UP (ref 40–120)
ALT FLD-CCNC: 74 U/L — HIGH (ref 10–45)
ANION GAP SERPL CALC-SCNC: 9 MMOL/L — SIGNIFICANT CHANGE UP (ref 5–17)
APPEARANCE UR: CLEAR — SIGNIFICANT CHANGE UP
AST SERPL-CCNC: 59 U/L — HIGH (ref 10–40)
BACTERIA # UR AUTO: PRESENT /HPF
BASOPHILS # BLD AUTO: 0.01 K/UL — SIGNIFICANT CHANGE UP (ref 0–0.2)
BASOPHILS NFR BLD AUTO: 0.1 % — SIGNIFICANT CHANGE UP (ref 0–2)
BILIRUB SERPL-MCNC: 0.2 MG/DL — SIGNIFICANT CHANGE UP (ref 0.2–1.2)
BILIRUB UR-MCNC: NEGATIVE — SIGNIFICANT CHANGE UP
BLD GP AB SCN SERPL QL: NEGATIVE — SIGNIFICANT CHANGE UP
BUN SERPL-MCNC: 28 MG/DL — HIGH (ref 7–23)
C3 SERPL-MCNC: 105 MG/DL — SIGNIFICANT CHANGE UP (ref 81–157)
C4 SERPL-MCNC: 23 MG/DL — SIGNIFICANT CHANGE UP (ref 13–39)
CALCIUM SERPL-MCNC: 8.4 MG/DL — SIGNIFICANT CHANGE UP (ref 8.4–10.5)
CANCER AG19-9 SERPL-ACNC: 10 U/ML — SIGNIFICANT CHANGE UP
CEA SERPL-MCNC: <0.6 NG/ML — SIGNIFICANT CHANGE UP (ref 0–3.8)
CHLORIDE SERPL-SCNC: 108 MMOL/L — SIGNIFICANT CHANGE UP (ref 96–108)
CO2 SERPL-SCNC: 22 MMOL/L — SIGNIFICANT CHANGE UP (ref 22–31)
COLOR SPEC: YELLOW — SIGNIFICANT CHANGE UP
CREAT SERPL-MCNC: 1.13 MG/DL — SIGNIFICANT CHANGE UP (ref 0.5–1.3)
DIFF PNL FLD: ABNORMAL
EGFR: 70 ML/MIN/1.73M2 — SIGNIFICANT CHANGE UP
EOSINOPHIL # BLD AUTO: 0 K/UL — SIGNIFICANT CHANGE UP (ref 0–0.5)
EOSINOPHIL NFR BLD AUTO: 0 % — SIGNIFICANT CHANGE UP (ref 0–6)
EPI CELLS # UR: SIGNIFICANT CHANGE UP /HPF (ref 0–5)
GLUCOSE SERPL-MCNC: 134 MG/DL — HIGH (ref 70–99)
GLUCOSE UR QL: NEGATIVE — SIGNIFICANT CHANGE UP
HCT VFR BLD CALC: 35.7 % — LOW (ref 39–50)
HCV AB S/CO SERPL IA: 0.04 S/CO — SIGNIFICANT CHANGE UP
HCV AB SERPL-IMP: SIGNIFICANT CHANGE UP
HGB BLD-MCNC: 11.9 G/DL — LOW (ref 13–17)
IMM GRANULOCYTES NFR BLD AUTO: 1.6 % — HIGH (ref 0–1.5)
KETONES UR-MCNC: NEGATIVE — SIGNIFICANT CHANGE UP
LEUKOCYTE ESTERASE UR-ACNC: NEGATIVE — SIGNIFICANT CHANGE UP
LYMPHOCYTES # BLD AUTO: 1.1 K/UL — SIGNIFICANT CHANGE UP (ref 1–3.3)
LYMPHOCYTES # BLD AUTO: 8.1 % — LOW (ref 13–44)
MAGNESIUM SERPL-MCNC: 2.1 MG/DL — SIGNIFICANT CHANGE UP (ref 1.6–2.6)
MCHC RBC-ENTMCNC: 29.1 PG — SIGNIFICANT CHANGE UP (ref 27–34)
MCHC RBC-ENTMCNC: 33.3 GM/DL — SIGNIFICANT CHANGE UP (ref 32–36)
MCV RBC AUTO: 87.3 FL — SIGNIFICANT CHANGE UP (ref 80–100)
MONOCYTES # BLD AUTO: 0.55 K/UL — SIGNIFICANT CHANGE UP (ref 0–0.9)
MONOCYTES NFR BLD AUTO: 4.1 % — SIGNIFICANT CHANGE UP (ref 2–14)
NEUTROPHILS # BLD AUTO: 11.64 K/UL — HIGH (ref 1.8–7.4)
NEUTROPHILS NFR BLD AUTO: 86.1 % — HIGH (ref 43–77)
NITRITE UR-MCNC: NEGATIVE — SIGNIFICANT CHANGE UP
NRBC # BLD: 0 /100 WBCS — SIGNIFICANT CHANGE UP (ref 0–0)
PH UR: 6 — SIGNIFICANT CHANGE UP (ref 5–8)
PHOSPHATE SERPL-MCNC: 3.9 MG/DL — SIGNIFICANT CHANGE UP (ref 2.5–4.5)
PLATELET # BLD AUTO: 261 K/UL — SIGNIFICANT CHANGE UP (ref 150–400)
POTASSIUM SERPL-MCNC: 4.1 MMOL/L — SIGNIFICANT CHANGE UP (ref 3.5–5.3)
POTASSIUM SERPL-SCNC: 4.1 MMOL/L — SIGNIFICANT CHANGE UP (ref 3.5–5.3)
PROT SERPL-MCNC: 5.5 G/DL — LOW (ref 6–8.3)
PROT UR-MCNC: NEGATIVE MG/DL — SIGNIFICANT CHANGE UP
RBC # BLD: 4.09 M/UL — LOW (ref 4.2–5.8)
RBC # FLD: 13.2 % — SIGNIFICANT CHANGE UP (ref 10.3–14.5)
RBC CASTS # UR COMP ASSIST: < 5 /HPF — SIGNIFICANT CHANGE UP
RH IG SCN BLD-IMP: NEGATIVE — SIGNIFICANT CHANGE UP
SODIUM SERPL-SCNC: 139 MMOL/L — SIGNIFICANT CHANGE UP (ref 135–145)
SP GR SPEC: 1.02 — SIGNIFICANT CHANGE UP (ref 1–1.03)
UROBILINOGEN FLD QL: 0.2 E.U./DL — SIGNIFICANT CHANGE UP
WBC # BLD: 13.51 K/UL — HIGH (ref 3.8–10.5)
WBC # FLD AUTO: 13.51 K/UL — HIGH (ref 3.8–10.5)
WBC UR QL: < 5 /HPF — SIGNIFICANT CHANGE UP

## 2022-04-16 PROCEDURE — 72156 MRI NECK SPINE W/O & W/DYE: CPT | Mod: 26

## 2022-04-16 PROCEDURE — 99233 SBSQ HOSP IP/OBS HIGH 50: CPT | Mod: GC

## 2022-04-16 PROCEDURE — 76705 ECHO EXAM OF ABDOMEN: CPT | Mod: 26

## 2022-04-16 PROCEDURE — 74183 MRI ABD W/O CNTR FLWD CNTR: CPT | Mod: 26

## 2022-04-16 PROCEDURE — 70553 MRI BRAIN STEM W/O & W/DYE: CPT | Mod: 26

## 2022-04-16 PROCEDURE — 99222 1ST HOSP IP/OBS MODERATE 55: CPT

## 2022-04-16 RX ORDER — SODIUM CHLORIDE 9 MG/ML
500 INJECTION, SOLUTION INTRAVENOUS ONCE
Refills: 0 | Status: COMPLETED | OUTPATIENT
Start: 2022-04-16 | End: 2022-04-16

## 2022-04-16 RX ORDER — IMMUNE GLOBULIN (HUMAN) 10 G/100ML
45 INJECTION INTRAVENOUS; SUBCUTANEOUS EVERY 24 HOURS
Refills: 0 | Status: COMPLETED | OUTPATIENT
Start: 2022-04-16 | End: 2022-04-18

## 2022-04-16 RX ORDER — DIPHENHYDRAMINE HCL 50 MG
25 CAPSULE ORAL EVERY 24 HOURS
Refills: 0 | Status: COMPLETED | OUTPATIENT
Start: 2022-04-16 | End: 2022-04-18

## 2022-04-16 RX ORDER — IMMUNE GLOBULIN (HUMAN) 10 G/100ML
45 INJECTION INTRAVENOUS; SUBCUTANEOUS EVERY 24 HOURS
Refills: 0 | Status: DISCONTINUED | OUTPATIENT
Start: 2022-04-16 | End: 2022-04-16

## 2022-04-16 RX ORDER — ACETAMINOPHEN 500 MG
650 TABLET ORAL EVERY 24 HOURS
Refills: 0 | Status: COMPLETED | OUTPATIENT
Start: 2022-04-16 | End: 2022-04-18

## 2022-04-16 RX ORDER — SODIUM CHLORIDE 9 MG/ML
500 INJECTION, SOLUTION INTRAVENOUS
Refills: 0 | Status: DISCONTINUED | OUTPATIENT
Start: 2022-04-16 | End: 2022-04-16

## 2022-04-16 RX ADMIN — Medication 1 MILLIGRAM(S): at 16:17

## 2022-04-16 RX ADMIN — SENNA PLUS 1 TABLET(S): 8.6 TABLET ORAL at 21:32

## 2022-04-16 RX ADMIN — Medication 25 MILLIGRAM(S): at 19:18

## 2022-04-16 RX ADMIN — TAMSULOSIN HYDROCHLORIDE 0.4 MILLIGRAM(S): 0.4 CAPSULE ORAL at 21:31

## 2022-04-16 RX ADMIN — Medication 650 MILLIGRAM(S): at 19:20

## 2022-04-16 RX ADMIN — SODIUM CHLORIDE 1000 MILLILITER(S): 9 INJECTION, SOLUTION INTRAVENOUS at 11:42

## 2022-04-16 RX ADMIN — Medication 650 MILLIGRAM(S): at 20:14

## 2022-04-16 RX ADMIN — ENOXAPARIN SODIUM 40 MILLIGRAM(S): 100 INJECTION SUBCUTANEOUS at 21:31

## 2022-04-16 RX ADMIN — IMMUNE GLOBULIN (HUMAN) 56.25 GRAM(S): 10 INJECTION INTRAVENOUS; SUBCUTANEOUS at 19:52

## 2022-04-16 NOTE — PROGRESS NOTE ADULT - ASSESSMENT
71 M with no past medical history, not on any medications, transferred here from [facility] for bilateral lower extremity weakness, with concern for Transverse Myelitis vs Guillan [place], for plasma exchange.

## 2022-04-16 NOTE — CONSULT NOTE ADULT - ASSESSMENT
71 M with no past medical history, not on any medications, transferred here from [facility] for bilateral lower extremity weakness, with concern for Transverse Myelitis vs Guillan [place], for plasma exchange. GI consulted for incidental pancreatic lesion noted on CT A/P at OSH.    #Pancreatic Lesion  Patient presenting as a transfer from OSH after recent admission for nausea/vomiting/diarrhea, managed for UTI, c/b acute onset LE weakness, concerning for transverse myelitis ill-defined low-density lesion within the pancreatic head and uncinate process measuring approximately 2.2 x 2.4 x 3.1 cm which may represent a benign or malignant neoplasm. The pancreatic lesion abuts and partially compresses the portal vein and superior aspect SMV. No other suspicious lesions noted on CT imaging. No associated elevation in liver enzymes. No other previous abdominal imaging for comparison. ddx wide however includes IPMN vs PNET   - f/u Ca 19-9, CEA  - Obtain MR Abd/Pelvis w/w/ IV contrast   - Can consider for EGD/EUS tentatively for Tuesday pending schedule availability     Case discussed with c attending and primary team.     Selina Reed DO  Gastroenterology Fellow  Pager: [phone]

## 2022-04-16 NOTE — PROGRESS NOTE ADULT - PROBLEM SELECTOR PLAN 2
CT AP 2.3 x 2.4 x 3.1 at pancreatic head, as per records, workup initiated at [facility]; however, unsure of results.  -will re order CA 19-9 and CEA  -can consider MRCP once neurologically stable CT AP 2.3 x 2.4 x 3.1 at pancreatic head, as per records, workup initiated at [facility]; however, unsure of results.  -Ca-19-9 and CEA negative at [facility]  -can consider MRCP once neurologically stable CT AP 2.3 x 2.4 x 3.1 at pancreatic head, as per records, workup initiated at [facility]; however, unsure of results.  -Ca-19-9 and CEA negative at [facility]  -GI consult per neuro CT AP 2.3 x 2.4 x 3.1 at pancreatic head, as per records, workup initiated at [facility]; however, unsure of results.  - Ca-19-9 and CEA negative at [facility]  - GI consulted  - MR abdomen/pelvis w cont  - tentative plan for EGD/EUS Tuesday

## 2022-04-16 NOTE — CONSULT NOTE ADULT - SUBJECTIVE AND OBJECTIVE BOX
HPI:  71 M with no past medical history, not on any medications, transferred here from [facility] for bilateral lower extremity weakness, with concern for Transverse Myelitis vs Guillan [place], for plasma exchange. Patient's story dates back to week and a half ago, when he went to [place] for a vacation, developed nausea, vomiting, diarrhea and weakness, accompanied by abd distension and difficulty urinating for which he went to [facility]. There, he was found to be in urosepsis, complicated by urinary obstruction for which Marie Catheter was placed. On day prior to planned discharge 4/12, he started to develop severe paresthesia, numbness, and weakness of bilateral lower extremities. He underwent abdominal imaging and MRI of spine at [facility], and was found to have pancreatic lesion at pancreatic head, and MRI thoracic spine consistent with transverse myelitis. He then was given high dose steroids for three days, which provided minimal improvement of symptoms. As a result, patient transferred to [facility] for possible plasma exchange.   Of note-patient travelled to [place] and [place] in November, for which he received a series of immunizations that he does not recall.     Currently, he is endorsing weakness, tingling, and numbness of his bilateral lower extremities up until the point of his umbilicus. He is denying any other symptoms of his UE, notes no weakness, paresthesias, tingling. Currently only complaint is abdominal pain and constipation.      Relevant imaging/studies from [facility]:  CT AP: Ill defined low density lesion within pancreatic head represent a benign or malignant neoplasm. Measures 2.2 x 2.4 x 3.1 cm which may represent a benign or malignant neoplasm, pancreatic lesion partially compresses the portal vein and superior aspect of SMV. Bowel: Mild diffuse wall thickening of the ascending transverse and upper half of the descending colon which may relate to possible mild non specific colitis.   MRI Thoracic:  Borderline abn signal within the central cord substance throughout entire length of thoracic spine could reflect transverse myelitis.   ESR- 30 CRP- 14.2  CSF total protein 80, CSF PCR negative, others pending (15 Apr 2022 20:38)    GI consulted for incidental pancreatic lesion noted on CT A/P.    Allergies    No Known Allergies    Intolerances      MEDICATIONS:  MEDICATIONS  (STANDING):  acetaminophen     Tablet .. 650 milliGRAM(s) Oral every 24 hours  diphenhydrAMINE 25 milliGRAM(s) Oral every 24 hours  enoxaparin Injectable 40 milliGRAM(s) SubCutaneous every 24 hours  immune   globulin 10% (GAMMAGARD) IVPB 45 Gram(s) IV Intermittent every 24 hours  LORazepam   Injectable 1 milliGRAM(s) IV Push once  polyethylene glycol 3350 17 Gram(s) Oral daily  senna 1 Tablet(s) Oral at bedtime  tamsulosin 0.4 milliGRAM(s) Oral at bedtime    MEDICATIONS  (PRN):  acetaminophen     Tablet .. 650 milliGRAM(s) Oral every 6 hours PRN Temp greater or equal to 38C (100.4F), Mild Pain (1 - 3)    PAST MEDICAL & SURGICAL HISTORY:    FAMILY HISTORY:  Family history of breast cancer in mother (Sibling)    FH: multiple myeloma (Mother)      SOCIAL HISTORY:  [place]: [ ] Current, [ ] Former, [ ] Never; Pack Years:  Alcohol:  Illicit Drugs:    REVIEW OF SYSTEMS:  Constitutional: No fever, chills, weight loss, or fatigue  ENMT:  No visual changes; No difficulty hearing, tinnitus, vertigo; No sinus or throat pain  Neck: No pain or stiffness  Respiratory: No cough, wheezing, or hemoptysis; No shortness of breath  Cardiovascular: No chest pain, palpitations, dizziness, orthopnea, PND, or leg swelling  Gastrointestinal: No abdominal or epigastric pain. No  nausea, vomiting, or hematemesis. No diarrhea, constipation, or steatorrhea. No melena or hematochezia  Genitourinary: No dysuria, urinary frequency/hesitancy, or hematuria  Skin: No itching, burning, rashes or lesions   Musculoskeletal: No joint pain or swelling; No muscle, back or extremity pain    Vital Signs Last 24 Hrs  T(C): 36.7 (16 Apr 2022 06:25), Max: 36.8 (15 Apr 2022 21:30)  T(F): 98 (16 Apr 2022 06:25), Max: 98.3 (15 Apr 2022 21:30)  HR: 73 (16 Apr 2022 06:25) (73 - 87)  BP: 115/69 (16 Apr 2022 06:25) (115/69 - 142/85)  BP(mean): --  RR: 18 (16 Apr 2022 06:25) (18 - 18)  SpO2: 94% (16 Apr 2022 06:25) (91% - 96%)      PHYSICAL EXAM:    General:  male; lying in bed; in no acute distress  HEENT: MMM, conjunctiva and sclera clear  Gastrointestinal: Soft, non-tender non-distended; Normal bowel sounds; No rebound or guarding  Extremities: Normal range of motion, No clubbing, cyanosis or edema  Neurological: Alert and oriented x3; unable to move lower extremities b/l  Skin: Warm and dry. No obvious rash    LABS:                        11.9   13.51 )-----------( 261      ( 16 Apr 2022 07:20 )             35.7     04-16    139  |  108  |  28<H>  ----------------------------<  134<H>  4.1   |  22  |  1.13    Ca    8.4      16 Apr 2022 07:20  Phos  3.9     04-16  Mg     2.1     04-16    TPro  5.5<L>  /  Alb  3.3  /  TBili  0.2  /  DBili  x   /  AST  59<H>  /  ALT  74<H>  /  AlkPhos  77  04-16        RADIOLOGY & ADDITIONAL STUDIES:

## 2022-04-16 NOTE — PROGRESS NOTE ADULT - PROBLEM SELECTOR PLAN 4
Elevation in AST ALT, could be 2/2 pancreatic mass versus autoimmune etiology. No significant drinking or drug history. No tenderness on exam, no organomegaly.   -follow RUQ US  -follow malignancy workup as above.

## 2022-04-16 NOTE — PROGRESS NOTE ADULT - PROBLEM SELECTOR PLAN 5
Patient initially to [facility] with urosepsis c/b urinary retention, likely 2/2 neurological process. Marie placed at [facility].   -continue with flomax 0.4 QHS  -neuro workup as above. Patient initially to [facility] with urosepsis c/b urinary retention, likely 2/2 neurological process. Marie placed at [facility].   -continue with flomax 0.4 QHS   -neuro workup as above.

## 2022-04-16 NOTE — PROGRESS NOTE ADULT - PROBLEM SELECTOR PLAN 1
Given history of diarrhea two weeks prior to LE weakness, ddx includes GBS versus transverse myelitis-imaging consistent with transverse myelitis. Lumbar tap done at [facility]-for which studies pending. There, ESR and CRP elevated. Patient finished three days of high dose steroids with minimal improvement in symptoms.   -neuro consulted-follow recs  -willh old off on steroids until evaluation by neurology   -lumbar tap done at [facility]-will follow up results  -neuro check q8  -follow up repeat MRI  -follow up AI workup Given history of diarrhea two weeks prior to LE weakness, ddx includes GBS versus transverse myelitis-imaging consistent with transverse myelitis. Lumbar tap done at [facility]-for which studies pending. There, ESR and CRP elevated. Patient finished three days of high dose steroids with minimal improvement in symptoms.   - begin IVIG 0.65g/kg x3 days  - MR head and CSpine w/wout   - NMO and anti-MOG antibodies

## 2022-04-16 NOTE — PROGRESS NOTE ADULT - SUBJECTIVE AND OBJECTIVE BOX
CC: Patient is a 71y old  Male who presents with a chief complaint of     INTERVAL EVENTS: ANDREA    SUBJECTIVE / INTERVAL HPI: Patient seen and examined at bedside.     ROS: negative unless otherwise stated above.    VITAL SIGNS:  Vital Signs Last 24 Hrs  T(C): 36.7 (2022 06:25), Max: 36.8 (15 Apr 2022 21:30)  T(F): 98 (2022 06:25), Max: 98.3 (15 Apr 2022 21:30)  HR: 73 (2022 06:25) (73 - 87)  BP: 115/69 (2022 06:25) (115/69 - 142/85)  BP(mean): --  RR: 18 (2022 06:25) (18 - 18)  SpO2: 94% (2022 06:25) (91% - 96%)        PHYSICAL EXAM:    General: NAD  HEENT: MMM  Neck: supple  Cardiovascular: +S1/S2; RRR  Respiratory: CTA B/L; no W/R/R  Gastrointestinal: soft, NT/ND  Extremities: WWP; no edema, clubbing or cyanosis  Vascular: 2+ radial, DP/PT pulses B/L  Neurological: AAOx3; no focal deficits    MEDICATIONS:  MEDICATIONS  (STANDING):  enoxaparin Injectable 40 milliGRAM(s) SubCutaneous every 24 hours  polyethylene glycol 3350 17 Gram(s) Oral daily  senna 1 Tablet(s) Oral at bedtime  tamsulosin 0.4 milliGRAM(s) Oral at bedtime    MEDICATIONS  (PRN):  acetaminophen     Tablet .. 650 milliGRAM(s) Oral every 6 hours PRN Temp greater or equal to 38C (100.4F), Mild Pain (1 - 3)      ALLERGIES:  Allergies    No Known Allergies    Intolerances        LABS:                        13.4   16.30 )-----------( 299      ( 15 Apr 2022 20:04 )             41.1     04-15    141  |  106  |  24<H>  ----------------------------<  160<H>  4.1   |  22  |  1.04    Ca    8.9      15 Apr 2022 20:04    TPro  6.1  /  Alb  3.5  /  TBili  0.3  /  DBili  x   /  AST  82<H>  /  ALT  70<H>  /  AlkPhos  100  04-15    PT/INR - ( 15 Apr 2022 20:04 )   PT: 12.3 sec;   INR: 1.03          PTT - ( 15 Apr 2022 20:04 )  PTT:27.2 sec  Urinalysis Basic - ( 2022 01:03 )    Color: Yellow / Appearance: Clear / S.025 / pH: x  Gluc: x / Ketone: NEGATIVE  / Bili: Negative / Urobili: 0.2 E.U./dL   Blood: x / Protein: NEGATIVE mg/dL / Nitrite: NEGATIVE   Leuk Esterase: NEGATIVE / RBC: < 5 /HPF / WBC < 5 /HPF   Sq Epi: x / Non Sq Epi: 0-5 /HPF / Bacteria: Present /HPF      CAPILLARY BLOOD GLUCOSE          RADIOLOGY & ADDITIONAL TESTS: Reviewed. CC: Patient is a 71y old  Male who presents with a chief complaint of     INTERVAL EVENTS: ANDREA    SUBJECTIVE / INTERVAL HPI: Patient seen and examined at bedside. no cp/sob, does not feel as if his neuro symptoms have improved much     ROS: negative unless otherwise stated above.    VITAL SIGNS:  Vital Signs Last 24 Hrs  T(C): 36.7 (2022 06:25), Max: 36.8 (15 Apr 2022 21:30)  T(F): 98 (2022 06:25), Max: 98.3 (15 Apr 2022 21:30)  HR: 73 (2022 06:25) (73 - 87)  BP: 115/69 (2022 06:25) (115/69 - 142/85)  BP(mean): --  RR: 18 (2022 06:25) (18 - 18)  SpO2: 94% (2022 06:25) (91% - 96%)        PHYSICAL EXAM:    General: NAD  HEENT: MMM  Neck: supple  Cardiovascular: +S1/S2; RRR  Respiratory: CTA B/L; no W/R/R  Gastrointestinal: soft, NT/ND  Extremities: WWP; no edema, clubbing or cyanosis  Vascular: 2+ radial, DP/PT pulses B/L  Neurological: AAOx3; no focal deficits    MEDICATIONS:  MEDICATIONS  (STANDING):  enoxaparin Injectable 40 milliGRAM(s) SubCutaneous every 24 hours  polyethylene glycol 3350 17 Gram(s) Oral daily  senna 1 Tablet(s) Oral at bedtime  tamsulosin 0.4 milliGRAM(s) Oral at bedtime    MEDICATIONS  (PRN):  acetaminophen     Tablet .. 650 milliGRAM(s) Oral every 6 hours PRN Temp greater or equal to 38C (100.4F), Mild Pain (1 - 3)      ALLERGIES:  Allergies    No Known Allergies    Intolerances        LABS:                        13.4   16.30 )-----------( 299      ( 15 Apr 2022 20:04 )             41.1     04-15    141  |  106  |  24<H>  ----------------------------<  160<H>  4.1   |  22  |  1.04    Ca    8.9      15 Apr 2022 20:04    TPro  6.1  /  Alb  3.5  /  TBili  0.3  /  DBili  x   /  AST  82<H>  /  ALT  70<H>  /  AlkPhos  100  04-15    PT/INR - ( 15 Apr 2022 20:04 )   PT: 12.3 sec;   INR: 1.03          PTT - ( 15 Apr 2022 20:04 )  PTT:27.2 sec  Urinalysis Basic - ( 2022 01:03 )    Color: Yellow / Appearance: Clear / S.025 / pH: x  Gluc: x / Ketone: NEGATIVE  / Bili: Negative / Urobili: 0.2 E.U./dL   Blood: x / Protein: NEGATIVE mg/dL / Nitrite: NEGATIVE   Leuk Esterase: NEGATIVE / RBC: < 5 /HPF / WBC < 5 /HPF   Sq Epi: x / Non Sq Epi: 0-5 /HPF / Bacteria: Present /HPF      CAPILLARY BLOOD GLUCOSE          RADIOLOGY & ADDITIONAL TESTS: Reviewed. CC: Patient is a 71y old  Male who presents with a chief complaint of     INTERVAL EVENTS: ANDREA    SUBJECTIVE / INTERVAL HPI: Patient seen and examined at bedside. no cp/sob, does not feel as if his neuro symptoms have improved much     ROS: negative unless otherwise stated above.    VITAL SIGNS:  Vital Signs Last 24 Hrs  T(C): 36.7 (2022 06:25), Max: 36.8 (15 Apr 2022 21:30)  T(F): 98 (2022 06:25), Max: 98.3 (15 Apr 2022 21:30)  HR: 73 (2022 06:25) (73 - 87)  BP: 115/69 (2022 06:25) (115/69 - 142/85)  BP(mean): --  RR: 18 (2022 06:25) (18 - 18)  SpO2: 94% (2022 06:25) (91% - 96%)        PHYSICAL EXAM:    Gen: NAD  HEENT: MMM  Neck: supple, trachea at midline  CV: RRR,  no murmurs   Pulm: CTAB, no incr wob   Abd: soft, NTND, BS +  Skin: warm and dry  Ext: unable to move bilateral lower extremities   Neuro: AOx3, bilateral lower extremity strength 0/5 , upper extremity strength 5/5 , b/l upgoing plantar reflexes   Psych: affect and behavior appropriate   Frank present     MEDICATIONS:  MEDICATIONS  (STANDING):  enoxaparin Injectable 40 milliGRAM(s) SubCutaneous every 24 hours  polyethylene glycol 3350 17 Gram(s) Oral daily  senna 1 Tablet(s) Oral at bedtime  tamsulosin 0.4 milliGRAM(s) Oral at bedtime    MEDICATIONS  (PRN):  acetaminophen     Tablet .. 650 milliGRAM(s) Oral every 6 hours PRN Temp greater or equal to 38C (100.4F), Mild Pain (1 - 3)      ALLERGIES:  Allergies    No Known Allergies    Intolerances        LABS:                        13.4   16.30 )-----------( 299      ( 15 Apr 2022 20:04 )             41.1     04-15    141  |  106  |  24<H>  ----------------------------<  160<H>  4.1   |  22  |  1.04    Ca    8.9      15 Apr 2022 20:04    TPro  6.1  /  Alb  3.5  /  TBili  0.3  /  DBili  x   /  AST  82<H>  /  ALT  70<H>  /  AlkPhos  100  04-15    PT/INR - ( 15 Apr 2022 20:04 )   PT: 12.3 sec;   INR: 1.03          PTT - ( 15 Apr 2022 20:04 )  PTT:27.2 sec  Urinalysis Basic - ( 2022 01:03 )    Color: Yellow / Appearance: Clear / S.025 / pH: x  Gluc: x / Ketone: NEGATIVE  / Bili: Negative / Urobili: 0.2 E.U./dL   Blood: x / Protein: NEGATIVE mg/dL / Nitrite: NEGATIVE   Leuk Esterase: NEGATIVE / RBC: < 5 /HPF / WBC < 5 /HPF   Sq Epi: x / Non Sq Epi: 0-5 /HPF / Bacteria: Present /HPF      CAPILLARY BLOOD GLUCOSE          RADIOLOGY & ADDITIONAL TESTS: Reviewed.

## 2022-04-16 NOTE — PROGRESS NOTE ADULT - PROBLEM SELECTOR PLAN 6
F-none   E-replete PRN   N-regular diet    DVT-lovenox 40SQ F-none    E-replete PRN    N-regular diet     DVT-lovenox 40SQ

## 2022-04-16 NOTE — PROGRESS NOTE ADULT - PROBLEM SELECTOR PLAN 3
Presented with WBC of 16, elevated at [facility] as well. Could be 2/2 infection-history of UTI at [facility] treated with 5 day course of CTX, denying any complaints. No cough, no persistent GI symptoms, no diarrhea, no vomiting. Could be 2/2 reactive process given neurological symptomology. Could also be 2/2 prednisone.   -UA negative   -CXR at [facility] clear without infiltrates  -Patient currently constipated-unlikely to be GI source.

## 2022-04-16 NOTE — CONSULT NOTE ADULT - ATTENDING COMMENTS
Pt was seen and examined on 04/17/2022. Agree with the above
Patient seen on 4/16/22  History reviewed with patient and family at bedside - 10 days ago developed nausea, vomiting, diarrhea; this improved but then 5 days ago developed abdominal distension and fever, went to ER, found to have urinary retention   CT abdomen showed pancreatic head lesion  He then developed numbness in his feet, spreading up the legs, followed by weakness in the legs that became severe  MRI T and L spine showed longitudinally extensive T2/FLAIR hyperintensity from upper thoracic cord down to the conus medullaris without significant enhancement   LP performed - 118 WBC (82% lymphocytes), protein 80, glucose 56 (serum 127), CSF PCR panel negative  RVP panel negative, ESR 30, CRP 14    He reports slight improvement in leg strength from thursday to friday, however still very severe weakness - 0-1/5 proximal LE, 1-2/5 distal LE  Reflexes are brisk throughout but more so in the legs, with extensor plantar responses and + Diaz's sign b/l  Mild dysmetria on finger to nose  T10/11 sensory level    Differential includes - infectious myelitis, post-infectious / inflammatory myelitis, carcinomatous myelopathy, paraneoplastic (encephalo)myelopathy    Recommend:  f/u MRI Brain and C spine results  f/u remaining CSF studies - cytology, ACE level, VZV antibodies  f/u serum paraneoplastic panel  check NMO and MOG antibodies  Continue IV solumedrol 1gm daily x 2 more days  Start IVIG for possible viral myelitis - 2gm/kg total divided over 3 days  Consider repeat LP for cytology based on imaging results  GI consult for pancreatic lesion - may need biopsy  Transfer to neurology service

## 2022-04-16 NOTE — PROGRESS NOTE ADULT - ATTENDING COMMENTS
Neuro exam   -bilateral lower extremity strength 0/5, only flicker of movement of toes   -sensory deficits below umbilicus   -dysmetria upper extremities     Acute Bilateral lower extremity weakness most likely due to transverse myelitis   -check Anti MOG antibodies  -MRI brain , Total Spine with and without contrast   -Discussed with Neurology team , plan to start steroids and IVIG   -? viral vs paraneoplastic vs Autoimmune etiology     Pancreatic Head mass   -GI consult for EUS /ERCP and Biopsy

## 2022-04-17 LAB
ALBUMIN SERPL ELPH-MCNC: 2.9 G/DL — LOW (ref 3.3–5)
ALP SERPL-CCNC: 87 U/L — SIGNIFICANT CHANGE UP (ref 40–120)
ALT FLD-CCNC: 91 U/L — HIGH (ref 10–45)
ANION GAP SERPL CALC-SCNC: 11 MMOL/L — SIGNIFICANT CHANGE UP (ref 5–17)
APPEARANCE UR: ABNORMAL
AST SERPL-CCNC: 73 U/L — HIGH (ref 10–40)
BACTERIA # UR AUTO: PRESENT /HPF
BASOPHILS # BLD AUTO: 0.01 K/UL — SIGNIFICANT CHANGE UP (ref 0–0.2)
BASOPHILS NFR BLD AUTO: 0.1 % — SIGNIFICANT CHANGE UP (ref 0–2)
BILIRUB SERPL-MCNC: 0.4 MG/DL — SIGNIFICANT CHANGE UP (ref 0.2–1.2)
BILIRUB UR-MCNC: NEGATIVE — SIGNIFICANT CHANGE UP
BUN SERPL-MCNC: 32 MG/DL — HIGH (ref 7–23)
CALCIUM SERPL-MCNC: 8.3 MG/DL — LOW (ref 8.4–10.5)
CHLORIDE SERPL-SCNC: 106 MMOL/L — SIGNIFICANT CHANGE UP (ref 96–108)
CK MB CFR SERPL CALC: 1.4 NG/ML — SIGNIFICANT CHANGE UP (ref 0–6.7)
CK MB CFR SERPL CALC: 1.6 NG/ML — SIGNIFICANT CHANGE UP (ref 0–6.7)
CK SERPL-CCNC: 133 U/L — SIGNIFICANT CHANGE UP (ref 30–200)
CK SERPL-CCNC: 35 U/L — SIGNIFICANT CHANGE UP (ref 30–200)
CO2 SERPL-SCNC: 21 MMOL/L — LOW (ref 22–31)
COLOR SPEC: YELLOW — SIGNIFICANT CHANGE UP
CREAT SERPL-MCNC: 1.12 MG/DL — SIGNIFICANT CHANGE UP (ref 0.5–1.3)
D DIMER BLD IA.RAPID-MCNC: 1116 NG/ML DDU — HIGH
DIFF PNL FLD: ABNORMAL
EGFR: 71 ML/MIN/1.73M2 — SIGNIFICANT CHANGE UP
EOSINOPHIL # BLD AUTO: 0.01 K/UL — SIGNIFICANT CHANGE UP (ref 0–0.5)
EOSINOPHIL NFR BLD AUTO: 0.1 % — SIGNIFICANT CHANGE UP (ref 0–6)
EPI CELLS # UR: SIGNIFICANT CHANGE UP /HPF (ref 0–5)
GLUCOSE SERPL-MCNC: 80 MG/DL — SIGNIFICANT CHANGE UP (ref 70–99)
GLUCOSE UR QL: NEGATIVE — SIGNIFICANT CHANGE UP
HCT VFR BLD CALC: 37.3 % — LOW (ref 39–50)
HGB BLD-MCNC: 12 G/DL — LOW (ref 13–17)
HYALINE CASTS # UR AUTO: SIGNIFICANT CHANGE UP /LPF (ref 0–2)
IMM GRANULOCYTES NFR BLD AUTO: 1.3 % — SIGNIFICANT CHANGE UP (ref 0–1.5)
KETONES UR-MCNC: ABNORMAL MG/DL
LEUKOCYTE ESTERASE UR-ACNC: ABNORMAL
LYMPHOCYTES # BLD AUTO: 1.31 K/UL — SIGNIFICANT CHANGE UP (ref 1–3.3)
LYMPHOCYTES # BLD AUTO: 14.7 % — SIGNIFICANT CHANGE UP (ref 13–44)
MAGNESIUM SERPL-MCNC: 2 MG/DL — SIGNIFICANT CHANGE UP (ref 1.6–2.6)
MCHC RBC-ENTMCNC: 28.4 PG — SIGNIFICANT CHANGE UP (ref 27–34)
MCHC RBC-ENTMCNC: 32.2 GM/DL — SIGNIFICANT CHANGE UP (ref 32–36)
MCV RBC AUTO: 88.4 FL — SIGNIFICANT CHANGE UP (ref 80–100)
MONOCYTES # BLD AUTO: 0.55 K/UL — SIGNIFICANT CHANGE UP (ref 0–0.9)
MONOCYTES NFR BLD AUTO: 6.2 % — SIGNIFICANT CHANGE UP (ref 2–14)
NEUTROPHILS # BLD AUTO: 6.92 K/UL — SIGNIFICANT CHANGE UP (ref 1.8–7.4)
NEUTROPHILS NFR BLD AUTO: 77.6 % — HIGH (ref 43–77)
NITRITE UR-MCNC: NEGATIVE — SIGNIFICANT CHANGE UP
NRBC # BLD: 0 /100 WBCS — SIGNIFICANT CHANGE UP (ref 0–0)
PH UR: 6 — SIGNIFICANT CHANGE UP (ref 5–8)
PHOSPHATE SERPL-MCNC: 3.9 MG/DL — SIGNIFICANT CHANGE UP (ref 2.5–4.5)
PLATELET # BLD AUTO: 201 K/UL — SIGNIFICANT CHANGE UP (ref 150–400)
POTASSIUM SERPL-MCNC: 4 MMOL/L — SIGNIFICANT CHANGE UP (ref 3.5–5.3)
POTASSIUM SERPL-SCNC: 4 MMOL/L — SIGNIFICANT CHANGE UP (ref 3.5–5.3)
PROT SERPL-MCNC: 6.3 G/DL — SIGNIFICANT CHANGE UP (ref 6–8.3)
PROT UR-MCNC: NEGATIVE MG/DL — SIGNIFICANT CHANGE UP
RBC # BLD: 4.22 M/UL — SIGNIFICANT CHANGE UP (ref 4.2–5.8)
RBC # FLD: 13.2 % — SIGNIFICANT CHANGE UP (ref 10.3–14.5)
RBC CASTS # UR COMP ASSIST: ABNORMAL /HPF
SODIUM SERPL-SCNC: 138 MMOL/L — SIGNIFICANT CHANGE UP (ref 135–145)
SP GR SPEC: 1.02 — SIGNIFICANT CHANGE UP (ref 1–1.03)
TROPONIN T SERPL-MCNC: 0.01 NG/ML — SIGNIFICANT CHANGE UP (ref 0–0.01)
TROPONIN T SERPL-MCNC: <0.01 NG/ML — SIGNIFICANT CHANGE UP (ref 0–0.01)
UROBILINOGEN FLD QL: 0.2 E.U./DL — SIGNIFICANT CHANGE UP
WBC # BLD: 8.92 K/UL — SIGNIFICANT CHANGE UP (ref 3.8–10.5)
WBC # FLD AUTO: 8.92 K/UL — SIGNIFICANT CHANGE UP (ref 3.8–10.5)
WBC UR QL: ABNORMAL /HPF

## 2022-04-17 PROCEDURE — 71275 CT ANGIOGRAPHY CHEST: CPT | Mod: 26

## 2022-04-17 PROCEDURE — 93010 ELECTROCARDIOGRAM REPORT: CPT

## 2022-04-17 PROCEDURE — 99233 SBSQ HOSP IP/OBS HIGH 50: CPT

## 2022-04-17 PROCEDURE — 71045 X-RAY EXAM CHEST 1 VIEW: CPT | Mod: 26

## 2022-04-17 PROCEDURE — 99233 SBSQ HOSP IP/OBS HIGH 50: CPT | Mod: GC

## 2022-04-17 RX ORDER — CEFTRIAXONE 500 MG/1
2000 INJECTION, POWDER, FOR SOLUTION INTRAMUSCULAR; INTRAVENOUS EVERY 24 HOURS
Refills: 0 | Status: DISCONTINUED | OUTPATIENT
Start: 2022-04-17 | End: 2022-04-20

## 2022-04-17 RX ADMIN — IMMUNE GLOBULIN (HUMAN) 56.25 GRAM(S): 10 INJECTION INTRAVENOUS; SUBCUTANEOUS at 19:43

## 2022-04-17 RX ADMIN — Medication 650 MILLIGRAM(S): at 19:30

## 2022-04-17 RX ADMIN — TAMSULOSIN HYDROCHLORIDE 0.4 MILLIGRAM(S): 0.4 CAPSULE ORAL at 22:18

## 2022-04-17 RX ADMIN — Medication 650 MILLIGRAM(S): at 11:56

## 2022-04-17 RX ADMIN — Medication 650 MILLIGRAM(S): at 10:56

## 2022-04-17 RX ADMIN — Medication 25 MILLIGRAM(S): at 19:17

## 2022-04-17 RX ADMIN — CEFTRIAXONE 100 MILLIGRAM(S): 500 INJECTION, POWDER, FOR SOLUTION INTRAMUSCULAR; INTRAVENOUS at 12:39

## 2022-04-17 RX ADMIN — Medication 650 MILLIGRAM(S): at 18:32

## 2022-04-17 RX ADMIN — SENNA PLUS 1 TABLET(S): 8.6 TABLET ORAL at 22:18

## 2022-04-17 RX ADMIN — ENOXAPARIN SODIUM 40 MILLIGRAM(S): 100 INJECTION SUBCUTANEOUS at 22:18

## 2022-04-17 RX ADMIN — POLYETHYLENE GLYCOL 3350 17 GRAM(S): 17 POWDER, FOR SOLUTION ORAL at 06:05

## 2022-04-17 NOTE — PROGRESS NOTE ADULT - ASSESSMENT
71 M with no past medical history, not on any medications, transferred here from [facility] for bilateral lower extremity weakness, with concern for Transverse Myelitis vs [place], for plasma exchange.  71 M with no past medical history, not on any medications, transferred here from [facility] for bilateral lower extremity weakness, with concern for Transverse Myelitis

## 2022-04-17 NOTE — PROGRESS NOTE ADULT - PROBLEM SELECTOR PLAN 5
Patient initially to [facility] with urosepsis c/b urinary retention, likely 2/2 neurological process. Marie placed at [facility].   -continue with flomax 0.4 QHS   -neuro workup as above.

## 2022-04-17 NOTE — PROGRESS NOTE ADULT - SUBJECTIVE AND OBJECTIVE BOX
GASTROENTEROLOGY PROGRESS NOTE  Patient seen and examined at bedside. MR Abdomen () - Ovoid multi-cystic structure within the uncinate process measuring up to 2.4 x 3.5 x 2 cm; no enhancement of the internal   septations/wall of the cystic structure, no evidence of mural nodule and no evidence of communication with the main pancreatic duct. Small 4 mm cyst body of the pancreas. Mild diffuse prominence of the pancreatic duct-dilated up to 5 mm in body and head; duct measures 3 mm in distal body-tail.    ROS: Patient reports no abdominal pain, nausea/vomiting.     PERTINENT REVIEW OF SYSTEMS:  CONSTITUTIONAL: No weakness, fevers or chills  HEENT: No visual changes; No vertigo or throat pain   GASTROINTESTINAL: As above.  NEUROLOGICAL: No numbness or weakness  SKIN: No itching, burning, rashes, or lesions     Allergies    No Known Allergies    Intolerances      MEDICATIONS:  MEDICATIONS  (STANDING):  acetaminophen     Tablet .. 650 milliGRAM(s) Oral every 24 hours  cefTRIAXone   IVPB 2000 milliGRAM(s) IV Intermittent every 24 hours  diphenhydrAMINE 25 milliGRAM(s) Oral every 24 hours  enoxaparin Injectable 40 milliGRAM(s) SubCutaneous every 24 hours  immune   globulin 10% (GAMMAGARD) IVPB 45 Gram(s) IV Intermittent every 24 hours  polyethylene glycol 3350 17 Gram(s) Oral daily  senna 1 Tablet(s) Oral at bedtime  tamsulosin 0.4 milliGRAM(s) Oral at bedtime    MEDICATIONS  (PRN):  acetaminophen     Tablet .. 650 milliGRAM(s) Oral every 6 hours PRN Temp greater or equal to 38C (100.4F), Mild Pain (1 - 3)    Vital Signs Last 24 Hrs  T(C): 36.2 (2022 14:17), Max: 38.1 (2022 10:44)  T(F): 97.2 (2022 14:17), Max: 100.5 (2022 10:44)  HR: 80 (2022 14:17) (69 - 104)  BP: 110/69 (2022 14:17) (110/69 - 149/69)  BP(mean): --  RR: 18 (2022 14:17) (16 - 18)  SpO2: 91% (2022 14:17) (91% - 98%)    16 @ 07:  -   @ 07:00  --------------------------------------------------------  IN: 0 mL / OUT: 300 mL / NET: -300 mL     @ 07:01  -   @ 15:39  --------------------------------------------------------  IN: 0 mL / OUT: 750 mL / NET: -750 mL      PHYSICAL EXAM:    General:  male; lying in bed; in no acute distress  HEENT: MMM, conjunctiva and sclera clear  Gastrointestinal: Soft, non-tender non-distended; Normal bowel sounds; No rebound or guarding  Extremities: Normal range of motion, No clubbing, cyanosis or edema  Neurological: Alert and oriented x3; unable to move lower extremities b/l  Skin: Warm and dry. No obvious rash    LABS:                        12.0   8.92  )-----------( 201      ( 2022 07:23 )             37.3         138  |  106  |  32<H>  ----------------------------<  80  4.0   |  21<L>  |  1.12    Ca    8.3<L>      2022 07:23  Phos  3.9       Mg     2.0         TPro  6.3  /  Alb  2.9<L>  /  TBili  0.4  /  DBili  x   /  AST  73<H>  /  ALT  91<H>  /  AlkPhos  87      PT/INR - ( 15 Apr 2022 20:04 )   PT: 12.3 sec;   INR: 1.03          PTT - ( 15 Apr 2022 20:04 )  PTT:27.2 sec      Urinalysis Basic - ( 2022 11:18 )    Color: Yellow / Appearance: SL Cloudy / S.020 / pH: x  Gluc: x / Ketone: Trace mg/dL  / Bili: Negative / Urobili: 0.2 E.U./dL   Blood: x / Protein: NEGATIVE mg/dL / Nitrite: NEGATIVE   Leuk Esterase: Large / RBC: 5-10 /HPF / WBC Many /HPF   Sq Epi: x / Non Sq Epi: 0-5 /HPF / Bacteria: Present /HPF                RADIOLOGY & ADDITIONAL STUDIES:  Reviewed

## 2022-04-17 NOTE — PROGRESS NOTE ADULT - PROBLEM SELECTOR PLAN 4
Elevation in AST ALT, could be 2/2 pancreatic mass versus autoimmune etiology. No significant drinking or drug history. No tenderness on exam, no organomegaly.   -follow RUQ US  -follow malignancy workup as above. Elevation in AST ALT, could be 2/2 pancreatic mass versus autoimmune etiology. No significant drinking or drug history. No tenderness on exam, no organomegaly.   -follow malignancy workup as above.

## 2022-04-17 NOTE — PROGRESS NOTE ADULT - ATTENDING COMMENTS
He still has severe leg weakness  MR brain normal  MR C spine with T2 hyperintensity in posterior mid to lower cervical cord without enhancement  B12 253 - check methylmalonic acid  MRI abdomen - pancreatic mass of unclear etiology - plan for EGD biopsy   CEA, CA-19 negative    This afternoon he became febrile and tachycardic, CTA PE protocol negative, U/A positive, started on ceftriaxone - f/u urine and blood cultures  Continue IVIG and IV steroids  Consider plasmapheresis if no improvement  f/u CSF studies from [facility] especially CSF cytology

## 2022-04-17 NOTE — PROGRESS NOTE ADULT - SUBJECTIVE AND OBJECTIVE BOX
SUBJECTIVE / INTERVAL HPI: Patient seen and examined at bedside. Pt continues to have     VITAL SIGNS:  Vital Signs Last 24 Hrs  T(C): 36.2 (2022 14:17), Max: 38.1 (2022 10:44)  T(F): 97.2 (2022 14:17), Max: 100.5 (2022 10:44)  HR: 80 (2022 14:17) (69 - 104)  BP: 110/69 (2022 14:17) (110/69 - 149/69)  BP(mean): --  RR: 18 (2022 14:17) (16 - 18)  SpO2: 91% (2022 14:17) (91% - 98%)    PHYSICAL EXAM:    General: WDWN, NAD, resting comfortably in bed  Neurologic:  -Mental status: Awake, alert, oriented to person, place, and time. Speech is fluent with intact naming, repetition, and comprehension, no dysarthria. Recent and remote memory intact. Follows commands. Attention/concentration intact. Fund of knowledge appropriate.  -Cranial nerves:   II: Visual fields are full to confrontation.  III, IV, VI: Extraocular movements are intact without nystagmus. Pupils equally round and reactive to light  V:  Facial sensation V1-V3 equal and intact   VII: Face is symmetric with normal eye closure and smile  VIII: Hearing is grossly intact  IX, X: Uvula is midline and soft palate rises symmetrically  XI: Head turning and shoulder shrug are intact.  XII: Tongue protrudes midline  Motor: No pronator drift. Strength bilateral upper extremity 5/5. 0/5 b/l LE strenght  Sensation:  Decreased sensation starting at T10/11 level  Coordination: Finger-to-nose intact b/l; unable to perform heel-to-shin bilaterally 2/2 weakness  Reflexes: brisk reflexes throughout, + Babinksi bilaterally      MEDICATIONS:  MEDICATIONS  (STANDING):  acetaminophen     Tablet .. 650 milliGRAM(s) Oral every 24 hours  cefTRIAXone   IVPB 2000 milliGRAM(s) IV Intermittent every 24 hours  diphenhydrAMINE 25 milliGRAM(s) Oral every 24 hours  enoxaparin Injectable 40 milliGRAM(s) SubCutaneous every 24 hours  immune   globulin 10% (GAMMAGARD) IVPB 45 Gram(s) IV Intermittent every 24 hours  polyethylene glycol 3350 17 Gram(s) Oral daily  senna 1 Tablet(s) Oral at bedtime  tamsulosin 0.4 milliGRAM(s) Oral at bedtime    MEDICATIONS  (PRN):  acetaminophen     Tablet .. 650 milliGRAM(s) Oral every 6 hours PRN Temp greater or equal to 38C (100.4F), Mild Pain (1 - 3)      ALLERGIES:  Allergies    No Known Allergies    Intolerances        LABS:                        12.0   8.92  )-----------( 201      ( 2022 07:23 )             37.3     04-17    138  |  106  |  32<H>  ----------------------------<  80  4.0   |  21<L>  |  1.12    Ca    8.3<L>      2022 07:23  Phos  3.9       Mg     2.0         TPro  6.3  /  Alb  2.9<L>  /  TBili  0.4  /  DBili  x   /  AST  73<H>  /  ALT  91<H>  /  AlkPhos  87  -17    PT/INR - ( 15 Apr 2022 20:04 )   PT: 12.3 sec;   INR: 1.03          PTT - ( 15 Apr 2022 20:04 )  PTT:27.2 sec  Urinalysis Basic - ( 2022 11:18 )    Color: Yellow / Appearance: SL Cloudy / S.020 / pH: x  Gluc: x / Ketone: Trace mg/dL  / Bili: Negative / Urobili: 0.2 E.U./dL   Blood: x / Protein: NEGATIVE mg/dL / Nitrite: NEGATIVE   Leuk Esterase: Large / RBC: 5-10 /HPF / WBC Many /HPF   Sq Epi: x / Non Sq Epi: 0-5 /HPF / Bacteria: Present /HPF      CAPILLARY BLOOD GLUCOSE          RADIOLOGY & ADDITIONAL TESTS: Reviewed.   SUBJECTIVE / INTERVAL HPI: Patient seen and examined at bedside. Pt continues to have severe leg weakness. Also feels chills and warm to touch per family.     VITAL SIGNS:  Vital Signs Last 24 Hrs  T(C): 36.2 (2022 14:17), Max: 38.1 (2022 10:44)  T(F): 97.2 (2022 14:17), Max: 100.5 (2022 10:44)  HR: 80 (2022 14:17) (69 - 104)  BP: 110/69 (2022 14:17) (110/69 - 149/69)  BP(mean): --  RR: 18 (2022 14:17) (16 - 18)  SpO2: 91% (2022 14:17) (91% - 98%)    PHYSICAL EXAM:    General: WDWN, NAD, resting comfortably in bed  Neurologic:  -Mental status: Awake, alert, oriented to person, place, and time. Speech is fluent with intact naming, repetition, and comprehension, no dysarthria. Recent and remote memory intact. Follows commands. Attention/concentration intact. Fund of knowledge appropriate.  -Cranial nerves:   II: Visual fields are full to confrontation.  III, IV, VI: Extraocular movements are intact without nystagmus. Pupils equally round and reactive to light  V:  Facial sensation V1-V3 equal and intact   VII: Face is symmetric with normal eye closure and smile  VIII: Hearing is grossly intact  IX, X: Uvula is midline and soft palate rises symmetrically  XI: Head turning and shoulder shrug are intact.  XII: Tongue protrudes midline  Motor: No pronator drift. Strength bilateral upper extremity 5/5. 0/5 b/l LE strength   Sensation:  Decreased sensation starting at T10/11 level  Coordination: Finger-to-nose intact b/l; unable to perform heel-to-shin bilaterally 2/2 weakness  Reflexes: brisk reflexes throughout, + Babinksi bilaterally      MEDICATIONS:  MEDICATIONS  (STANDING):  acetaminophen     Tablet .. 650 milliGRAM(s) Oral every 24 hours  cefTRIAXone   IVPB 2000 milliGRAM(s) IV Intermittent every 24 hours  diphenhydrAMINE 25 milliGRAM(s) Oral every 24 hours  enoxaparin Injectable 40 milliGRAM(s) SubCutaneous every 24 hours  immune   globulin 10% (GAMMAGARD) IVPB 45 Gram(s) IV Intermittent every 24 hours  polyethylene glycol 3350 17 Gram(s) Oral daily  senna 1 Tablet(s) Oral at bedtime  tamsulosin 0.4 milliGRAM(s) Oral at bedtime    MEDICATIONS  (PRN):  acetaminophen     Tablet .. 650 milliGRAM(s) Oral every 6 hours PRN Temp greater or equal to 38C (100.4F), Mild Pain (1 - 3)      ALLERGIES:  Allergies    No Known Allergies    Intolerances        LABS:                        12.0   8.92  )-----------( 201      ( 2022 07:23 )             37.3     04-17    138  |  106  |  32<H>  ----------------------------<  80  4.0   |  21<L>  |  1.12    Ca    8.3<L>      2022 07:23  Phos  3.9     04-17  Mg     2.0     04-17    TPro  6.3  /  Alb  2.9<L>  /  TBili  0.4  /  DBili  x   /  AST  73<H>  /  ALT  91<H>  /  AlkPhos  87  04-17    PT/INR - ( 15 Apr 2022 20:04 )   PT: 12.3 sec;   INR: 1.03          PTT - ( 15 Apr 2022 20:04 )  PTT:27.2 sec  Urinalysis Basic - ( 2022 11:18 )    Color: Yellow / Appearance: SL Cloudy / S.020 / pH: x  Gluc: x / Ketone: Trace mg/dL  / Bili: Negative / Urobili: 0.2 E.U./dL   Blood: x / Protein: NEGATIVE mg/dL / Nitrite: NEGATIVE   Leuk Esterase: Large / RBC: 5-10 /HPF / WBC Many /HPF   Sq Epi: x / Non Sq Epi: 0-5 /HPF / Bacteria: Present /HPF      CAPILLARY BLOOD GLUCOSE          RADIOLOGY & ADDITIONAL TESTS: Reviewed.

## 2022-04-17 NOTE — PROGRESS NOTE ADULT - PROBLEM SELECTOR PLAN 3
Presented with WBC of 16, elevated at [facility] as well. Could be 2/2 infection-history of UTI at [facility] treated with 5 day course of CTX, denying any complaints. No cough, no persistent GI symptoms, no diarrhea, no vomiting. Could be 2/2 reactive process given neurological symptomology. Could also be 2/2 prednisone.   -UA negative   -CXR at [facility] clear without infiltrates  -Patient currently constipated-unlikely to be GI source.  - started on ceftriaxone

## 2022-04-17 NOTE — PROGRESS NOTE ADULT - PROBLEM SELECTOR PLAN 2
CT AP 2.3 x 2.4 x 3.1 at pancreatic head, as per records, workup initiated at [facility]; however, unsure of results.  - Ca-19-9 and CEA negative at [facility]  - GI consulted  - MR abdomen/pelvis w cont  - tentative plan for EGD/EUS CT AP 2.3 x 2.4 x 3.1 at pancreatic head, as per records, workup initiated at [facility]; however, unsure of results.  - Ca-19-9 and CEA negative at [facility]  - MR abdomen/pelvis w cont - pancreatic mass indeterminate etiology   - GI consult - tentative plan for EGD/EUS

## 2022-04-17 NOTE — PROGRESS NOTE ADULT - PROBLEM SELECTOR PLAN 1
Given history of diarrhea two weeks prior to LE weakness, ddx includes GBS versus transverse myelitis-imaging consistent with transverse myelitis. Lumbar tap done at [facility]-for which studies pending. There, ESR and CRP elevated. Patient finished three days of high dose steroids with minimal improvement in symptoms.   - c/w IVIG 0.65g/kg x3 days, possible plasma exchange  - MR head and CSpine w/wout   - NMO and anti-MOG antibodies Given history of diarrhea two weeks prior to LE weakness, ddx includes GBS versus transverse myelitis-imaging consistent with transverse myelitis. Lumbar tap done at [facility]-for which studies pending. There, ESR and CRP elevated. Patient finished three days of high dose steroids with minimal improvement in symptoms.   - c/w IVIG 0.65g/kg x3 days, possible plasma exchange  - MR head and CSpine w/wout - faint T2 hyperintensity in posterior lower cervical cord without definite enhancement   - NMO and anti-MOG antibodies

## 2022-04-17 NOTE — PROGRESS NOTE ADULT - SUBJECTIVE AND OBJECTIVE BOX
Patient is a 71y old  Male who presents with a chief complaint of Bilateral lower extremity weakness (2022 06:44)        SUBJECTIVE:  Patient was seen and examined at bedside.    Overnight Events :  intermittent tachycardia, feeling flushed , 1 febrile episode , no chest pain , mild drop in o2 saturations   , patient does not complain of any shortness of breath , does not have any complaints of chest discomfort or difficulty breathing   received IVig on     no change in bilateral leg weakness       Review of systems: 12 point Review of systems negative unless otherwise documented elsewhere in note.     Diet, Regular (04-15-22 @ 21:54) [Active]      MEDICATIONS:  MEDICATIONS  (STANDING):  acetaminophen     Tablet .. 650 milliGRAM(s) Oral every 24 hours  cefTRIAXone   IVPB 2000 milliGRAM(s) IV Intermittent every 24 hours  diphenhydrAMINE 25 milliGRAM(s) Oral every 24 hours  enoxaparin Injectable 40 milliGRAM(s) SubCutaneous every 24 hours  immune   globulin 10% (GAMMAGARD) IVPB 45 Gram(s) IV Intermittent every 24 hours  polyethylene glycol 3350 17 Gram(s) Oral daily  senna 1 Tablet(s) Oral at bedtime  tamsulosin 0.4 milliGRAM(s) Oral at bedtime    MEDICATIONS  (PRN):  acetaminophen     Tablet .. 650 milliGRAM(s) Oral every 6 hours PRN Temp greater or equal to 38C (100.4F), Mild Pain (1 - 3)      Allergies    No Known Allergies    Intolerances        OBJECTIVE:  Vital Signs Last 24 Hrs  T(C): 38.1 (2022 10:44), Max: 38.1 (2022 10:44)  T(F): 100.5 (2022 10:44), Max: 100.5 (2022 10:44)  HR: 104 (2022 09:36) (69 - 104)  BP: 112/70 (2022 09:36) (112/70 - 149/69)  BP(mean): --  RR: 17 (2022 09:36) (16 - 18)  SpO2: 91% (2022 09:36) (91% - 98%)  I&O's Summary    2022 07:01  -  2022 07:00  --------------------------------------------------------  IN: 0 mL / OUT: 300 mL / NET: -300 mL        PHYSICAL EXAM:  Gen: Resting in bed at time of exam, not in distress   HEENT: moist mucosa, no lesions   Neck: supple, trachea at midline  CV: tachcycardia,  no mumurs   Pulm: no wheezing , no crackles  no increase in work of breathing  Abd: soft, NTND  Skin: warm and dry, no new rashes   Ext: unable to move bilateral lower extremities   Neuro: AOx3, bilateral lower extremity strength 0/5 , upper extremity strength 5/5 , upgoing plantars    Psych: affect and behavior appropriate, pleasant at time of interview    LABS:                        12.0   8.92  )-----------( 201      ( 2022 07:23 )             37.3     04-17    138  |  106  |  32<H>  ----------------------------<  80  4.0   |  21<L>  |  1.12    Ca    8.3<L>      2022 07:23  Phos  3.9     04-17  Mg     2.0     04-17    TPro  6.3  /  Alb  2.9<L>  /  TBili  0.4  /  DBili  x   /  AST  73<H>  /  ALT  91<H>  /  AlkPhos  87  04-17    LIVER FUNCTIONS - ( 2022 07:23 )  Alb: 2.9 g/dL / Pro: 6.3 g/dL / ALK PHOS: 87 U/L / ALT: 91 U/L / AST: 73 U/L / GGT: x           PT/INR - ( 15 Apr 2022 20:04 )   PT: 12.3 sec;   INR: 1.03          PTT - ( 15 Apr 2022 20:04 )  PTT:27.2 sec  CAPILLARY BLOOD GLUCOSE        Urinalysis Basic - ( 2022 11:18 )    Color: Yellow / Appearance: SL Cloudy / S.020 / pH: x  Gluc: x / Ketone: Trace mg/dL  / Bili: Negative / Urobili: 0.2 E.U./dL   Blood: x / Protein: NEGATIVE mg/dL / Nitrite: NEGATIVE   Leuk Esterase: Large / RBC: 5-10 /HPF / WBC Many /HPF   Sq Epi: x / Non Sq Epi: 0-5 /HPF / Bacteria: Present /HPF        MICRODATA:      RADIOLOGY/OTHER STUDIES:

## 2022-04-17 NOTE — PROVIDER CONTACT NOTE (OTHER) - ASSESSMENT
AOx4, V/S 112/70, Temp oral 98.4, , Pt feels warm to touch. Temp checked rectally as per MD Reji Rae. 100.5 .No SOB, no chest pain .

## 2022-04-17 NOTE — PROGRESS NOTE ADULT - ASSESSMENT
71 M with no past medical history, not on any medications, transferred here from [facility] for bilateral lower extremity weakness, with concern for Transverse Myelitis vs Guillan Arrington, for plasma exchange. GI consulted for incidental pancreatic lesion noted on CT A/P at OSH.    #Pancreatic Lesion  Patient presenting as a transfer from OSH after recent admission for nausea/vomiting/diarrhea, managed for UTI, c/b acute onset LE weakness, concerning for transverse myelitis ill-defined low-density lesion within the pancreatic head and uncinate process measuring approximately 2.2 x 2.4 x 3.1 cm which may represent a benign or malignant neoplasm. The pancreatic lesion abuts and partially compresses the portal vein and superior aspect SMV. No other suspicious lesions noted on CT imaging. No associated elevation in liver enzymes. No other previous abdominal imaging for comparison. ddx wide however includes IPMN vs PNET   - Ca 19-9, CEA WNL  - MR Abd/Pelvis w/w/ IV contrast (4/16) - Ovoid multi-cystic structure within the uncinate process measuring up to 2.4 x 3.5 x 2 cm; no enhancement of the internal   septations/wall of the cystic structure, no evidence of mural nodule and no evidence of communication with the main pancreatic duct. Small 4 mm cyst body of the pancreas. Mild diffuse prominence of the pancreatic duct-dilated up to 5 mm in body and head; duct measures 3 mm in distal body-tail.  -Based on above MR study, would warrant EGD/EUS evaluation based on size of pancreatic lesion >3 cm however would defer until PE ruled out (pending CT PE) and once acute neurological issues addressed. Can be pursued as an outpatient or inpatient pending clinical course.     Case [place] [facility] [place] and primary team.     Selina Reed DO  Gastroenterology Fellow  Pager: [phone]

## 2022-04-17 NOTE — PROGRESS NOTE ADULT - ASSESSMENT
71 year old male admitted to the hospital with bilateral lower extremity weakness     on 4/17 he was intermittently tachycardic,  o2 sats on room air decreased to 93% on room air from 100% , Fever 100.5     had a ct chest at [facility] which revealed plate like atelectasis in the Lingula     Impression and Plan   1. Bilateral Lower Extremity Weakness most likely Transverse myelitis , Received 3 days of steroids prior to transfer to [facility] from [facility] , started on IVIG per primary team on 4/16. Further investigation and treatment per primary team     2. Fever + Tachycardia   -UA + , exchange nation catheter ( placed at [facility] due to urinary retention )   -start Ceftriaxone , Check urine cx   -check ddimer , CTA if elevated     3. Mild elevated Transaminases  Ultrasound RUQ, without gall bladder disease , + for mild hepatic steatosis     4. Small Pancreatic cystic mass,  Negative ,GI consult , Planning for EUS and Biopsy    5. Left sacral enhancement on L spine MRI , Please check Lumbosacral CT prior to discharge to evaluate     6. Lovenox for DVT prophylaxis    71 year old male admitted to the hospital with bilateral lower extremity weakness     on 4/17 he was intermittently tachycardic,  o2 sats on room air decreased to 93% on room air from 100% , Fever 100.5     had a ct chest at [facility] which revealed plate like atelectasis in the Lingula     Impression and Plan   1. Bilateral Lower Extremity Weakness most likely Transverse myelitis , Received 3 days of steroids prior to transfer to [facility] from [facility] , started on IVIG per primary team on 4/16. Further investigation and treatment per primary team     2. Fever + Tachycardia   -UA + , exchange nation catheter ( placed at [facility] due to urinary retention )    -start Ceftriaxone ,  Suspect catheter associated urinary tract infection ,Check urine cx   -check ddimer , CTA if elevated     3. Mild elevated Transaminases  Ultrasound RUQ, without gall bladder disease , + for mild hepatic steatosis     4. Small Pancreatic cystic mass,  Negative ,GI consult , Planning for EUS and Biopsy    5. Left sacral enhancement on L spine MRI , Please check Lumbosacral CT prior to discharge to evaluate     6. Lovenox for DVT prophylaxis

## 2022-04-18 ENCOUNTER — TRANSCRIPTION ENCOUNTER (OUTPATIENT)
Age: 72
End: 2022-04-18

## 2022-04-18 LAB
ALBUMIN SERPL ELPH-MCNC: 2.5 G/DL — LOW (ref 3.3–5)
ALP SERPL-CCNC: 112 U/L — SIGNIFICANT CHANGE UP (ref 40–120)
ALT FLD-CCNC: 214 U/L — HIGH (ref 10–45)
ANION GAP SERPL CALC-SCNC: 8 MMOL/L — SIGNIFICANT CHANGE UP (ref 5–17)
APTT BLD: 30.9 SEC — SIGNIFICANT CHANGE UP (ref 27.5–35.5)
AST SERPL-CCNC: 129 U/L — HIGH (ref 10–40)
BASOPHILS # BLD AUTO: 0.01 K/UL — SIGNIFICANT CHANGE UP (ref 0–0.2)
BASOPHILS NFR BLD AUTO: 0.1 % — SIGNIFICANT CHANGE UP (ref 0–2)
BILIRUB SERPL-MCNC: 0.5 MG/DL — SIGNIFICANT CHANGE UP (ref 0.2–1.2)
BUN SERPL-MCNC: 20 MG/DL — SIGNIFICANT CHANGE UP (ref 7–23)
CALCIUM SERPL-MCNC: 8 MG/DL — LOW (ref 8.4–10.5)
CHLORIDE SERPL-SCNC: 102 MMOL/L — SIGNIFICANT CHANGE UP (ref 96–108)
CO2 SERPL-SCNC: 25 MMOL/L — SIGNIFICANT CHANGE UP (ref 22–31)
CREAT SERPL-MCNC: 1.14 MG/DL — SIGNIFICANT CHANGE UP (ref 0.5–1.3)
CRP SERPL-MCNC: 159.7 MG/L — HIGH (ref 0–4)
D DIMER BLD IA.RAPID-MCNC: 806 NG/ML DDU — HIGH
EGFR: 69 ML/MIN/1.73M2 — SIGNIFICANT CHANGE UP
EOSINOPHIL # BLD AUTO: 0.03 K/UL — SIGNIFICANT CHANGE UP (ref 0–0.5)
EOSINOPHIL NFR BLD AUTO: 0.3 % — SIGNIFICANT CHANGE UP (ref 0–6)
GLUCOSE SERPL-MCNC: 100 MG/DL — HIGH (ref 70–99)
HCT VFR BLD CALC: 36.2 % — LOW (ref 39–50)
HGB BLD-MCNC: 11.9 G/DL — LOW (ref 13–17)
IMM GRANULOCYTES NFR BLD AUTO: 1.6 % — HIGH (ref 0–1.5)
INR BLD: 1.25 — HIGH (ref 0.88–1.16)
LYMPHOCYTES # BLD AUTO: 0.99 K/UL — LOW (ref 1–3.3)
LYMPHOCYTES # BLD AUTO: 10.8 % — LOW (ref 13–44)
MAGNESIUM SERPL-MCNC: 2.2 MG/DL — SIGNIFICANT CHANGE UP (ref 1.6–2.6)
MCHC RBC-ENTMCNC: 28.9 PG — SIGNIFICANT CHANGE UP (ref 27–34)
MCHC RBC-ENTMCNC: 32.9 GM/DL — SIGNIFICANT CHANGE UP (ref 32–36)
MCV RBC AUTO: 87.9 FL — SIGNIFICANT CHANGE UP (ref 80–100)
MONOCYTES # BLD AUTO: 0.36 K/UL — SIGNIFICANT CHANGE UP (ref 0–0.9)
MONOCYTES NFR BLD AUTO: 3.9 % — SIGNIFICANT CHANGE UP (ref 2–14)
NEUTROPHILS # BLD AUTO: 7.6 K/UL — HIGH (ref 1.8–7.4)
NEUTROPHILS NFR BLD AUTO: 83.3 % — HIGH (ref 43–77)
NRBC # BLD: 0 /100 WBCS — SIGNIFICANT CHANGE UP (ref 0–0)
PHOSPHATE SERPL-MCNC: 3.4 MG/DL — SIGNIFICANT CHANGE UP (ref 2.5–4.5)
PLATELET # BLD AUTO: 195 K/UL — SIGNIFICANT CHANGE UP (ref 150–400)
POTASSIUM SERPL-MCNC: 3.8 MMOL/L — SIGNIFICANT CHANGE UP (ref 3.5–5.3)
POTASSIUM SERPL-SCNC: 3.8 MMOL/L — SIGNIFICANT CHANGE UP (ref 3.5–5.3)
PROT SERPL-MCNC: 6.6 G/DL — SIGNIFICANT CHANGE UP (ref 6–8.3)
PROTHROM AB SERPL-ACNC: 14.9 SEC — HIGH (ref 10.5–13.4)
RBC # BLD: 4.12 M/UL — LOW (ref 4.2–5.8)
RBC # FLD: 13.5 % — SIGNIFICANT CHANGE UP (ref 10.3–14.5)
SODIUM SERPL-SCNC: 135 MMOL/L — SIGNIFICANT CHANGE UP (ref 135–145)
WBC # BLD: 9.14 K/UL — SIGNIFICANT CHANGE UP (ref 3.8–10.5)
WBC # FLD AUTO: 9.14 K/UL — SIGNIFICANT CHANGE UP (ref 3.8–10.5)

## 2022-04-18 PROCEDURE — 99233 SBSQ HOSP IP/OBS HIGH 50: CPT | Mod: GC

## 2022-04-18 PROCEDURE — 99233 SBSQ HOSP IP/OBS HIGH 50: CPT

## 2022-04-18 RX ORDER — PREGABALIN 225 MG/1
1000 CAPSULE ORAL ONCE
Refills: 0 | Status: COMPLETED | OUTPATIENT
Start: 2022-04-19 | End: 2022-04-19

## 2022-04-18 RX ORDER — PREGABALIN 225 MG/1
1000 CAPSULE ORAL DAILY
Refills: 0 | Status: DISCONTINUED | OUTPATIENT
Start: 2022-04-20 | End: 2022-05-09

## 2022-04-18 RX ORDER — CALCIUM GLUCONATE 100 MG/ML
2 VIAL (ML) INTRAVENOUS ONCE
Refills: 0 | Status: DISCONTINUED | OUTPATIENT
Start: 2022-04-20 | End: 2022-04-21

## 2022-04-18 RX ORDER — ALBUMIN HUMAN 25 %
3250 VIAL (ML) INTRAVENOUS ONCE
Refills: 0 | Status: DISCONTINUED | OUTPATIENT
Start: 2022-04-20 | End: 2022-04-21

## 2022-04-18 RX ORDER — POTASSIUM CHLORIDE 20 MEQ
20 PACKET (EA) ORAL ONCE
Refills: 0 | Status: COMPLETED | OUTPATIENT
Start: 2022-04-18 | End: 2022-04-18

## 2022-04-18 RX ADMIN — POLYETHYLENE GLYCOL 3350 17 GRAM(S): 17 POWDER, FOR SOLUTION ORAL at 12:28

## 2022-04-18 RX ADMIN — CEFTRIAXONE 100 MILLIGRAM(S): 500 INJECTION, POWDER, FOR SOLUTION INTRAMUSCULAR; INTRAVENOUS at 12:29

## 2022-04-18 RX ADMIN — IMMUNE GLOBULIN (HUMAN) 56.25 GRAM(S): 10 INJECTION INTRAVENOUS; SUBCUTANEOUS at 15:28

## 2022-04-18 RX ADMIN — TAMSULOSIN HYDROCHLORIDE 0.4 MILLIGRAM(S): 0.4 CAPSULE ORAL at 22:09

## 2022-04-18 RX ADMIN — ENOXAPARIN SODIUM 40 MILLIGRAM(S): 100 INJECTION SUBCUTANEOUS at 22:08

## 2022-04-18 RX ADMIN — Medication 25 MILLIGRAM(S): at 15:03

## 2022-04-18 RX ADMIN — Medication 20 MILLIEQUIVALENT(S): at 22:09

## 2022-04-18 RX ADMIN — Medication 650 MILLIGRAM(S): at 13:30

## 2022-04-18 RX ADMIN — Medication 650 MILLIGRAM(S): at 12:28

## 2022-04-18 RX ADMIN — SENNA PLUS 1 TABLET(S): 8.6 TABLET ORAL at 22:09

## 2022-04-18 NOTE — OCCUPATIONAL THERAPY INITIAL EVALUATION ADULT - MD ORDER
71 M with no past medical history, not on any medications, transferred here from [facility] for bilateral lower extremity weakness, with concern for Transverse Myelitis vs Guillan Claridge, for plasma exchange.

## 2022-04-18 NOTE — DISCHARGE NOTE PROVIDER - NSDCCPCAREPLAN_GEN_ALL_CORE_FT
PRINCIPAL DISCHARGE DIAGNOSIS  Diagnosis: Lower extremity weakness  Assessment and Plan of Treatment:       SECONDARY DISCHARGE DIAGNOSES  Diagnosis: Pancreatic mass  Assessment and Plan of Treatment:      PRINCIPAL DISCHARGE DIAGNOSIS  Diagnosis: Lower extremity weakness  Assessment and Plan of Treatment: Transverse myelitis is inflammation of the spinal cord that can be idiopathic (no known cause) or due to causes such as viral illness (influenza, HIV, CMV, EBV), gastroenteritis, Lyme dsiease, and surgery. In your case, we do not know the cause unfortunately. You received 7 sessions of plasma exchange which      SECONDARY DISCHARGE DIAGNOSES  Diagnosis: Pancreatic mass  Assessment and Plan of Treatment:      PRINCIPAL DISCHARGE DIAGNOSIS  Diagnosis: Lower extremity weakness  Assessment and Plan of Treatment: Transverse myelitis is inflammation of the spinal cord that can be idiopathic (no known cause) or due to causes such as viral illness (influenza, HIV, CMV, EBV), gastroenteritis, Lyme dsiease, and surgery. In your case, we do not know the cause unfortunately. You received 7 sessions of plasma exchange as well as a steroid course which you're currently on. These regimens are meant to decrease the inflammation present in your spinal cord. You should continue taking prednisone 50 mg in the week of May 1st, and decrease the dosage by 10 mg every subsequent week.   Please follow up with Dr Bolton (neurology) for additional informations.      SECONDARY DISCHARGE DIAGNOSES  Diagnosis: Pancreatic mass  Assessment and Plan of Treatment: You were found to have a pancreatic mass on imaging. Tumors marker levels in your bloodwork were negative. We believe this mass is most likely benign, and you would require elective surgery. Please follow up with Dr Rojas at your scheduled date in Oct 2022 for planning of your elective surgery.

## 2022-04-18 NOTE — DISCHARGE NOTE PROVIDER - CARE PROVIDER_API CALL
Jon Corbett)  Neurology; Neuromuscular Medicine  [address], [address] [place] [address]  Phone: [phone]  Fax: [phone]  Follow Up Time: 2 weeks   Jon Corbett)  Neurology; Neuromuscular Medicine  [address], [address] [place] [address]  Phone: [phone]  Fax: [phone]  Scheduled Appointment: 08/26/2022 04:00 PM   Ayo Bolton)  Neurology  [address], [place] [facility]  Phone: [phone]  Fax: [phone]  Follow Up Time:    Ayo Bolton)  Neurology  [address], [address] [place] [address]  Phone: [phone]  Fax: [phone]  Follow Up Time:     Vladimir Rojas  GASTROENTEROLOGY  [address] [address]  Phone: [phone]  Fax: [phone]  Scheduled Appointment: 10/21/2022 02:00 PM   Ayo Bolton)  Neurology  [address], [address] [place] [address]  Phone: [phone]  Fax: [phone]  Scheduled Appointment: 07/22/2022 10:00 AM    Vladimir Rojas  GASTROENTEROLOGY  [address] [address]  Phone: [phone]  Fax: [phone]  Scheduled Appointment: 10/21/2022 02:00 PM

## 2022-04-18 NOTE — PROGRESS NOTE ADULT - ASSESSMENT
71yM w/no PMHx p/w bl LE weakness and numbness, with imaging suggestive of transverse myelitis. GI consulted for incidentally found pancreatic lesion on CT.    Abnormal imaging - CT A/P at OSH w/ill-defined low-density lesion in the pancreatic head/uncinate, ~2.2x2.4x3.1 cm, abutting the portal vein, superior aspect of SMV. CEA and Ca19-9 both within normal limits. Follow-up MR with multi-cystic structure in the uncinate measuring up to 3.5cm w/associated PD dilation; possible PD communication. Differential includes a large serous cystadenoma or more likely a main duct IPMN.  - Discussed imaging findings with patient and high-risk features including size and PD dilation with recommendation for EGD/EUS for biopsy when optimized  - Patient amenable to procedure  - GI service will continue to follow peripherally, please reach out when patient optimized for sedation from Neurologic perspective    Recommendations discussed with primary team  Case discussed with attending physician 71yM w/no PMHx p/w bl LE weakness and numbness, with imaging suggestive of transverse myelitis. GI consulted for incidentally found pancreatic lesion on CT.    Abnormal imaging - CT A/P at OSH w/ill-defined low-density lesion in the pancreatic head/uncinate, ~2.2x2.4x3.1 cm, abutting the portal vein, superior aspect of SMV. CEA and Ca19-9 both within normal limits. Follow-up MR with multi-cystic structure in the uncinate measuring up to 3.5cm w/associated PD dilation; possible PD communication. Differential includes a large serous cystadenoma or more likely a main duct IPMN.  - Discussed imaging findings with patient and high-risk features including size and PD dilation with recommendation for EGD/EUS for biopsy when optimized  - Patient amenable to procedure  - GI service will continue to follow peripherally, please reach out when patient optimized for sedation from Neurologic perspective    Transaminitis - Patient noted to have transaminitis on presentation in hepatocellular pattern, appears to be rising. T bili and alk phos normal.  - Labs from OSH reviewed  - Please obtain lipid panel, A1c, Iron studies, Ceruloplasmin, Alpha 1 Antitrypsin, Hepatitis A B C and E serologies, anti-Smooth Muscle Ab, anti-Mitochondrial Ab, EZIO  - No utility in obtaining quantitative IgG as patient currently getting IVIG  - No abnormalities on MR or US  - Daily MELD labs - CMP and Coags    Recommendations discussed with primary team  Case discussed with attending physician 71yM w/no PMHx p/w bl LE weakness and numbness, with imaging suggestive of transverse myelitis. GI consulted for incidentally found pancreatic lesion on CT.    Abnormal imaging - CT A/P at OSH w/ill-defined low-density lesion in the pancreatic head/uncinate, ~2.2x2.4x3.1 cm, abutting the portal vein, superior aspect of SMV. CEA and Ca19-9 both within normal limits. Follow-up MR with multi-cystic structure in the uncinate measuring up to 3.5cm w/associated PD dilation; possible PD communication. Differential includes a large serous cystadenoma or more likely a main duct IPMN.  - Discussed imaging findings with patient and high-risk features including size and PD dilation with recommendation for EGD/EUS for biopsy when optimized  - Patient amenable to procedure  - GI service will continue to follow peripherally, please reach out when patient optimized for sedation from Neurologic perspective    Transaminitis - Patient noted to have transaminitis on presentation in hepatocellular pattern, appears to be rising. T bili and alk phos normal.  - Labs from OSH reviewed  - Please obtain lipid panel, A1c, Iron studies, Ceruloplasmin, Alpha 1 Antitrypsin, Hepatitis A B C and E serologies, anti-Smooth Muscle Ab, anti-Mitochondrial Ab, EZIO  - No utility in obtaining quantitative IgG as patient currently getting IVIG  - Please obtain VZV, HSV, CMV, EBV PCRs and HIV Screening  - No abnormalities on MR or US  - Daily MELD labs - CMP and Coags    Recommendations discussed with primary team  Case discussed with attending physician

## 2022-04-18 NOTE — PROVIDER CONTACT NOTE (OTHER) - RECOMMENDATIONS
MD Edwards made aware, no further intervention needed will continue to monitor.
Cooling measures done, urine specimen brought to lab ( U/A and C/X ).

## 2022-04-18 NOTE — OCCUPATIONAL THERAPY INITIAL EVALUATION ADULT - GENERAL OBSERVATIONS, REHAB EVAL
Pt's RN [place] aware of intent to to eval/tx; cleared Pt. Pt received in supine - +heplock, IVs, bed alarm. Pt's family at bedside. Pt w/ fair affect, agreeable to OT.

## 2022-04-18 NOTE — PROGRESS NOTE ADULT - PROBLEM SELECTOR PLAN 2
CT AP 2.3 x 2.4 x 3.1 at pancreatic head, as per records, workup initiated at [facility]; however, unsure of results.  - Ca-19-9 and CEA negative at [facility]  - MR abdomen/pelvis w cont - pancreatic mass indeterminate etiology   - GI consult - tentative plan for EGD/EUS

## 2022-04-18 NOTE — PROGRESS NOTE ADULT - PROBLEM SELECTOR PLAN 1
Given history of diarrhea two weeks prior to LE weakness, ddx includes GBS versus transverse myelitis-imaging consistent with transverse myelitis. Lumbar tap done at [facility]-for which studies pending. There, ESR and CRP elevated. Patient finished three days of high dose steroids with minimal improvement in symptoms.   - c/w IVIG 0.65g/kg x3 days, possible plasma exchange  - MR head and CSpine w/wout - faint T2 hyperintensity in posterior lower cervical cord without definite enhancement   - NMO and anti-MOG antibodies Given history of diarrhea two weeks prior to LE weakness, ddx includes GBS versus transverse myelitis-imaging consistent with transverse myelitis. Lumbar tap done at [facility]-for which studies pending. There, ESR and CRP elevated. Patient finished three days of high dose steroids with minimal improvement in symptoms.   - c/w IVIG 0.65g/kg x3 days, possible plasma exchange starting 4/20 (will consult heme/onc today)  - MR head and CSpine w/wout - faint T2 hyperintensity in posterior lower cervical cord without definite enhancement   - NMO and anti-MOG antibodies Given history of diarrhea two weeks prior to LE weakness, ddx includes GBS versus transverse myelitis-imaging consistent with transverse myelitis. Lumbar tap done at [facility]-for which studies pending. There, ESR and CRP elevated. Patient finished three days of high dose steroids with minimal improvement in symptoms.   - c/w IVIG 0.65g/kg x3 days, possible plasma exchange starting 4/20 (will consult heme/onc today)  - MR head and CSpine w/wout - faint T2 hyperintensity in posterior lower cervical cord without definite enhancement   - NMO and anti-MOG antibodies  - CINP to place dialysis cath 4/19 for first session 4/20

## 2022-04-18 NOTE — PROGRESS NOTE ADULT - ATTENDING COMMENTS
He reports mild improvement in LE sensation especially in left leg, however no improvement in strength which remains 0/5 except for some involuntary movements of legs  3rd day of IVIG today   Start oral prednisone 60mg daily  Plan for plasmapheresis starting 4/20

## 2022-04-18 NOTE — PROGRESS NOTE ADULT - ASSESSMENT
71 M with no past medical history, not on any medications, transferred here from [facility] for bilateral lower extremity weakness, with concern for Transverse Myelitis

## 2022-04-18 NOTE — CONSULT NOTE ADULT - ASSESSMENT
per Neurology    71 M with no past medical history, not on any medications, transferred here from [facility] for bilateral lower extremity weakness, with concern for Transverse Myelitis    Problem/Plan - 1:  ·  Problem: Lower extremity weakness.   ·  Plan: Given history of diarrhea two weeks prior to LE weakness, ddx includes GBS versus transverse myelitis-imaging consistent with transverse myelitis. Lumbar tap done at [facility]-for which studies pending. There, ESR and CRP elevated. Patient finished three days of high dose steroids with minimal improvement in symptoms.   - c/w IVIG 0.65g/kg x3 days, possible plasma exchange  - MR head and CSpine w/wout - faint T2 hyperintensity in posterior lower cervical cord without definite enhancement   - NMO and anti-MOG antibodies.    Problem/Plan - 2:  ·  Problem: Pancreatic mass.   ·  Plan: CT AP 2.3 x 2.4 x 3.1 at pancreatic head, as per records, workup initiated at [facility]; however, unsure of results.  - Ca-19-9 and CEA negative at [facility]  - MR abdomen/pelvis w cont - pancreatic mass indeterminate etiology   - GI consult - tentative plan for EGD/EUS.    Problem/Plan - 3:  ·  Problem: Leukocytosis.   ·  Plan: Presented with WBC of 16, elevated at [facility] as well. Could be 2/2 infection-history of UTI at [facility] treated with 5 day course of CTX, denying any complaints. No cough, no persistent GI symptoms, no diarrhea, no vomiting. Could be 2/2 reactive process given neurological symptomology. Could also be 2/2 prednisone.   - UA positive, nation exchanged   - CXR at [facility] clear without infiltrates  - Patient currently constipated-unlikely to be GI source.  - started on ceftriaxone 2g q24 hrs.    Problem/Plan - 4:  ·  Problem: Transaminitis.   ·  Plan: Elevation in AST ALT, could be 2/2 pancreatic mass versus autoimmune etiology. No significant drinking or drug history. No tenderness on exam, no organomegaly.   -follow malignancy workup as above.    Problem/Plan - 5:  ·  Problem: Urinary retention.   ·  Plan: Patient initially to [facility] with urosepsis c/b urinary retention, likely 2/2 neurological process. Nation placed at [facility].   -continue with flomax 0.4 QHS   -neuro workup as above.    Problem/Plan - 6:  ·  Problem: Preventive measure.   ·  Plan: F-none    E-replete PRN    N-regular diet      DVT-lovenox 40SQ.    Problem/Plan - 7:  ·  Problem: R/O Pulmonary embolism.   ·  Plan: - pt was tachycardia, d-dimer elevated  - CT angio negative for PE.

## 2022-04-18 NOTE — PHYSICAL THERAPY INITIAL EVALUATION ADULT - SENSORY TESTS
Pt reports sensory deficits, anterior: Nipple level downwards, and able to feel light touch posterior back and imapairedf from L1 downards.

## 2022-04-18 NOTE — PROGRESS NOTE ADULT - SUBJECTIVE AND OBJECTIVE BOX
Patient: MARCIAL ROMANO   Age: 71y   Gender:Male    OVERNIGHT EVENTS: naeo     SUBJECTIVE / INTERVAL HPI: reports LE strength is unchanged.     VITAL SIGNS:  Vital Signs Last 24 Hrs  T(C): 37.1 (2022 05:16), Max: 38.1 (2022 10:44)  T(F): 98.7 (2022 05:16), Max: 100.5 (2022 10:44)  HR: 72 (2022 05:16) (70 - 104)  BP: 146/87 (2022 05:16) (110/69 - 148/80)  BP(mean): --  RR: 18 (2022 05:16) (16 - 18)  SpO2: 92% (2022 05:16) (91% - 96%)    22 @ 07:01  -  22 @ 07:00  --------------------------------------------------------  IN: 0 mL / OUT: 2250 mL / NET: -2250 mL        PHYSICAL EXAM:  Gen: NAD  HEENT: MMM  Neck: supple, trachea at midline  CV: RRR,  no murmurs   Pulm: CTAB, no incr wob   Abd: soft, NTND, BS +  Skin: warm and dry  Ext: unable to move bilateral lower extremities   Neuro: AOx3, bilateral lower extremity strength 0/5 , upper extremity strength 5/5 , b/l upgoing plantar reflexes   Psych: affect and behavior appropriate     MEDICATIONS:  MEDICATIONS  (STANDING):  acetaminophen     Tablet .. 650 milliGRAM(s) Oral every 24 hours  cefTRIAXone   IVPB 2000 milliGRAM(s) IV Intermittent every 24 hours  diphenhydrAMINE 25 milliGRAM(s) Oral every 24 hours  enoxaparin Injectable 40 milliGRAM(s) SubCutaneous every 24 hours  immune   globulin 10% (GAMMAGARD) IVPB 45 Gram(s) IV Intermittent every 24 hours  polyethylene glycol 3350 17 Gram(s) Oral daily  potassium chloride    Tablet ER 20 milliEquivalent(s) Oral once  senna 1 Tablet(s) Oral at bedtime  tamsulosin 0.4 milliGRAM(s) Oral at bedtime    MEDICATIONS  (PRN):  acetaminophen     Tablet .. 650 milliGRAM(s) Oral every 6 hours PRN Temp greater or equal to 38C (100.4F), Mild Pain (1 - 3)      ALLERGIES:  Allergies    No Known Allergies    Intolerances        LABS:                        11.9   9.14  )-----------( 195      ( 2022 06:52 )             36.2     18    135  |  102  |  20  ----------------------------<  100<H>  3.8   |  25  |  1.14    Ca    8.0<L>      2022 06:52  Phos  3.4       Mg     2.2         TPro  6.6  /  Alb  2.5<L>  /  TBili  0.5  /  DBili  x   /  AST  129<H>  /  ALT  214<H>  /  AlkPhos  112  18    PT/INR - ( 2022 06:52 )   PT: 14.9 sec;   INR: 1.25          PTT - ( 2022 06:52 )  PTT:30.9 sec  Urinalysis Basic - ( 2022 11:18 )    Color: Yellow / Appearance: SL Cloudy / S.020 / pH: x  Gluc: x / Ketone: Trace mg/dL  / Bili: Negative / Urobili: 0.2 E.U./dL   Blood: x / Protein: NEGATIVE mg/dL / Nitrite: NEGATIVE   Leuk Esterase: Large / RBC: 5-10 /HPF / WBC Many /HPF   Sq Epi: x / Non Sq Epi: 0-5 /HPF / Bacteria: Present /HPF      CAPILLARY BLOOD GLUCOSE          Culture - Blood (collected 22 @ 12:37)  Source: .Blood Blood  Preliminary Report (22 @ 01:01):    No growth at 12 hours    Culture - Blood (collected 22 @ 12:37)  Source: .Blood Blood  Preliminary Report (22 @ 01:01):    No growth at 12 hours      RADIOLOGY & ADDITIONAL TESTS: Reviewed.

## 2022-04-18 NOTE — PROGRESS NOTE ADULT - PROBLEM SELECTOR PLAN 7
- pt was tachycardia, d-dimer elevated  - pending CTA - pt was tachycardia, d-dimer elevated  - CT angio negative for PE

## 2022-04-18 NOTE — PROGRESS NOTE ADULT - PROBLEM SELECTOR PLAN 5
Patient initially to [facility] with urosepsis c/b urinary retention, likely 2/2 neurological process. Marie placed at [facility].   -continue with flomax 0.4 QHS   -neuro workup as above.

## 2022-04-18 NOTE — PROGRESS NOTE ADULT - SUBJECTIVE AND OBJECTIVE BOX
GASTROENTEROLOGY PROGRESS NOTE  Patient seen and examined at bedside. No acute GI complaints at this time.    PERTINENT REVIEW OF SYSTEMS:  CONSTITUTIONAL: Weakness and numbness of b/l LE  HEENT: No visual changes; No vertigo or throat pain   GASTROINTESTINAL: No abdominal or epigastric pain. No nausea, vomiting, or hematemesis; No diarrhea or constipation. No melena or hematochezia.  NEUROLOGICAL: No numbness or weakness  SKIN: No itching, burning, rashes, or lesions     Allergies    No Known Allergies    Intolerances      MEDICATIONS:  MEDICATIONS  (STANDING):  acetaminophen     Tablet .. 650 milliGRAM(s) Oral every 24 hours  cefTRIAXone   IVPB 2000 milliGRAM(s) IV Intermittent every 24 hours  diphenhydrAMINE 25 milliGRAM(s) Oral every 24 hours  enoxaparin Injectable 40 milliGRAM(s) SubCutaneous every 24 hours  immune   globulin 10% (GAMMAGARD) IVPB 45 Gram(s) IV Intermittent every 24 hours  polyethylene glycol 3350 17 Gram(s) Oral daily  potassium chloride    Tablet ER 20 milliEquivalent(s) Oral once  senna 1 Tablet(s) Oral at bedtime  tamsulosin 0.4 milliGRAM(s) Oral at bedtime    MEDICATIONS  (PRN):  acetaminophen     Tablet .. 650 milliGRAM(s) Oral every 6 hours PRN Temp greater or equal to 38C (100.4F), Mild Pain (1 - 3)    Vital Signs Last 24 Hrs  T(C): 37.1 (2022 05:16), Max: 37.1 (2022 22:34)  T(F): 98.7 (2022 05:16), Max: 98.7 (2022 22:34)  HR: 72 (2022 05:16) (70 - 80)  BP: 146/87 (2022 05:16) (110/69 - 148/80)  BP(mean): --  RR: 18 (2022 05:16) (16 - 18)  SpO2: 92% (2022 05:16) (91% - 96%)     @ 07:01  -   @ 07:00  --------------------------------------------------------  IN: 0 mL / OUT: 2250 mL / NET: -2250 mL      PHYSICAL EXAM:    General: Well developed; well nourished; in no acute distress  HEENT: MMM, conjunctiva and sclera clear  Gastrointestinal: Soft non-tender non-distended; Normal bowel sounds; No hepatosplenomegaly. No rebound or guarding  Skin: Warm and dry. No obvious rash    LABS:                        11.9   9.14  )-----------( 195      ( 2022 06:52 )             36.2     04-18    135  |  102  |  20  ----------------------------<  100<H>  3.8   |  25  |  1.14    Ca    8.0<L>      2022 06:52  Phos  3.4     04-18  Mg     2.2     04-18    TPro  6.6  /  Alb  2.5<L>  /  TBili  0.5  /  DBili  x   /  AST  129<H>  /  ALT  214<H>  /  AlkPhos  112  04-18    PT/INR - ( 2022 06:52 )   PT: 14.9 sec;   INR: 1.25          PTT - ( 2022 06:52 )  PTT:30.9 sec      Urinalysis Basic - ( 2022 11:18 )    Color: Yellow / Appearance: SL Cloudy / S.020 / pH: x  Gluc: x / Ketone: Trace mg/dL  / Bili: Negative / Urobili: 0.2 E.U./dL   Blood: x / Protein: NEGATIVE mg/dL / Nitrite: NEGATIVE   Leuk Esterase: Large / RBC: 5-10 /HPF / WBC Many /HPF   Sq Epi: x / Non Sq Epi: 0-5 /HPF / Bacteria: Present /HPF                Culture - Blood (collected 2022 12:37)  Source: .Blood Blood  Preliminary Report (2022 01:01):    No growth at 12 hours    Culture - Blood (collected 2022 12:37)  Source: .Blood Blood  Preliminary Report (2022 01:01):    No growth at 12 hours      RADIOLOGY & ADDITIONAL STUDIES:  Reviewed

## 2022-04-18 NOTE — PROGRESS NOTE ADULT - PROBLEM SELECTOR PLAN 4
Elevation in AST ALT, could be 2/2 pancreatic mass versus autoimmune etiology. No significant drinking or drug history. No tenderness on exam, no organomegaly.   -follow malignancy workup as above.

## 2022-04-18 NOTE — PROGRESS NOTE ADULT - SUBJECTIVE AND OBJECTIVE BOX
CC: Patient is a 71y old  Male who presents with a chief complaint of Bilateral lower extremity weakness       INTERVAL EVENTS: ANDREA    SUBJECTIVE / INTERVAL HPI: Patient seen and examined at bedside.     ROS: negative unless otherwise stated above.    VITAL SIGNS:  Vital Signs Last 24 Hrs  T(C): 37.1 (2022 05:16), Max: 38.1 (2022 10:44)  T(F): 98.7 (2022 05:16), Max: 100.5 (2022 10:44)  HR: 72 (2022 05:16) (70 - 104)  BP: 146/87 (2022 05:16) (110/69 - 148/80)  BP(mean): --  RR: 18 (2022 05:16) (16 - 18)  SpO2: 92% (2022 05:16) (91% - 96%)      22 @ 07:01  -  22 @ 07:00  --------------------------------------------------------  IN: 0 mL / OUT: 300 mL / NET: -300 mL    22 @ 07:01  -  22 @ 06:38  --------------------------------------------------------  IN: 0 mL / OUT: 1550 mL / NET: -1550 mL        PHYSICAL EXAM:    General: NAD  HEENT: MMM  Neck: supple  Cardiovascular: +S1/S2; RRR  Respiratory: CTA B/L; no W/R/R  Gastrointestinal: soft, NT/ND  Extremities: WWP; no edema, clubbing or cyanosis  Vascular: 2+ radial, DP/PT pulses B/L  Neurological: AAOx3; no focal deficits    MEDICATIONS:  MEDICATIONS  (STANDING):  acetaminophen     Tablet .. 650 milliGRAM(s) Oral every 24 hours  cefTRIAXone   IVPB 2000 milliGRAM(s) IV Intermittent every 24 hours  diphenhydrAMINE 25 milliGRAM(s) Oral every 24 hours  enoxaparin Injectable 40 milliGRAM(s) SubCutaneous every 24 hours  immune   globulin 10% (GAMMAGARD) IVPB 45 Gram(s) IV Intermittent every 24 hours  polyethylene glycol 3350 17 Gram(s) Oral daily  senna 1 Tablet(s) Oral at bedtime  tamsulosin 0.4 milliGRAM(s) Oral at bedtime    MEDICATIONS  (PRN):  acetaminophen     Tablet .. 650 milliGRAM(s) Oral every 6 hours PRN Temp greater or equal to 38C (100.4F), Mild Pain (1 - 3)      ALLERGIES:  Allergies    No Known Allergies    Intolerances        LABS:                        12.0   8.92  )-----------( 201      ( 2022 07:23 )             37.3         138  |  106  |  32<H>  ----------------------------<  80  4.0   |  21<L>  |  1.12    Ca    8.3<L>      2022 07:23  Phos  3.9       Mg     2.0         TPro  6.3  /  Alb  2.9<L>  /  TBili  0.4  /  DBili  x   /  AST  73<H>  /  ALT  91<H>  /  AlkPhos  87        Urinalysis Basic - ( 2022 11:18 )    Color: Yellow / Appearance: SL Cloudy / S.020 / pH: x  Gluc: x / Ketone: Trace mg/dL  / Bili: Negative / Urobili: 0.2 E.U./dL   Blood: x / Protein: NEGATIVE mg/dL / Nitrite: NEGATIVE   Leuk Esterase: Large / RBC: 5-10 /HPF / WBC Many /HPF   Sq Epi: x / Non Sq Epi: 0-5 /HPF / Bacteria: Present /HPF      CAPILLARY BLOOD GLUCOSE          RADIOLOGY & ADDITIONAL TESTS: Reviewed. CC: Patient is a 71y old  Male who presents with a chief complaint of Bilateral lower extremity weakness       INTERVAL EVENTS: ANDREA    SUBJECTIVE / INTERVAL HPI: Patient seen and examined at bedside. no cp/sob, no subjective change in LE function/sensation     ROS: negative unless otherwise stated above.    VITAL SIGNS:  Vital Signs Last 24 Hrs  T(C): 37.1 (2022 05:16), Max: 38.1 (2022 10:44)  T(F): 98.7 (2022 05:16), Max: 100.5 (2022 10:44)  HR: 72 (2022 05:16) (70 - 104)  BP: 146/87 (2022 05:16) (110/69 - 148/80)  BP(mean): --  RR: 18 (2022 05:16) (16 - 18)  SpO2: 92% (2022 05:16) (91% - 96%)      22 @ 07:01  -  22 @ 07:00  --------------------------------------------------------  IN: 0 mL / OUT: 300 mL / NET: -300 mL    22 @ 07:01  -  22 @ 06:38  --------------------------------------------------------  IN: 0 mL / OUT: 1550 mL / NET: -1550 mL        PHYSICAL EXAM:    Head: NC/AT  Eyes: PERRL, EOMI, clear conjunctiva  ENT: no nasal discharge; uvula midline, no oropharyngeal erythema or exudates; MMM  Neck: supple; no JVD or thyromegaly  Respiratory: CTA B/L; no W/R/R, no retractions  Cardiac: +S1/S2; RRR; no M/R/G; PMI non-displaced  Gastrointestinal:  tender to palpation in luq-distended.  Extremities: WWP, no clubbing or cyanosis; no peripheral edema  Vascular: 2+ radial, femoral, DP/PT pulses B/L  Dermatologic: skin warm, dry and intact; no rashes, wounds, or scars  Lymphatic: no submandibular or cervical LAD  Neurologic: AAOx3; CNII-XII grossly intact; LE strength 0/5, sensation in tact L > R. UE strength 5/5, sensation intact bilaterally.     MEDICATIONS:  MEDICATIONS  (STANDING):  acetaminophen     Tablet .. 650 milliGRAM(s) Oral every 24 hours  cefTRIAXone   IVPB 2000 milliGRAM(s) IV Intermittent every 24 hours  diphenhydrAMINE 25 milliGRAM(s) Oral every 24 hours  enoxaparin Injectable 40 milliGRAM(s) SubCutaneous every 24 hours  immune   globulin 10% (GAMMAGARD) IVPB 45 Gram(s) IV Intermittent every 24 hours  polyethylene glycol 3350 17 Gram(s) Oral daily  senna 1 Tablet(s) Oral at bedtime  tamsulosin 0.4 milliGRAM(s) Oral at bedtime    MEDICATIONS  (PRN):  acetaminophen     Tablet .. 650 milliGRAM(s) Oral every 6 hours PRN Temp greater or equal to 38C (100.4F), Mild Pain (1 - 3)      ALLERGIES:  Allergies    No Known Allergies    Intolerances        LABS:                        12.0   8.92  )-----------( 201      ( 2022 07:23 )             37.3         138  |  106  |  32<H>  ----------------------------<  80  4.0   |  21<L>  |  1.12    Ca    8.3<L>      2022 07:23  Phos  3.9       Mg     2.0         TPro  6.3  /  Alb  2.9<L>  /  TBili  0.4  /  DBili  x   /  AST  73<H>  /  ALT  91<H>  /  AlkPhos  87        Urinalysis Basic - ( 2022 11:18 )    Color: Yellow / Appearance: SL Cloudy / S.020 / pH: x  Gluc: x / Ketone: Trace mg/dL  / Bili: Negative / Urobili: 0.2 E.U./dL   Blood: x / Protein: NEGATIVE mg/dL / Nitrite: NEGATIVE   Leuk Esterase: Large / RBC: 5-10 /HPF / WBC Many /HPF   Sq Epi: x / Non Sq Epi: 0-5 /HPF / Bacteria: Present /HPF      CAPILLARY BLOOD GLUCOSE          RADIOLOGY & ADDITIONAL TESTS: Reviewed. CC: Patient is a 71y old  Male who presents with a chief complaint of Bilateral lower extremity weakness       INTERVAL EVENTS: ANDREA    SUBJECTIVE / INTERVAL HPI: Patient seen and examined at bedside. no cp/sob, reports increased sensation in legs but no improvement in strength     ROS: negative unless otherwise stated above.    VITAL SIGNS:  Vital Signs Last 24 Hrs  T(C): 37.1 (2022 05:16), Max: 38.1 (2022 10:44)  T(F): 98.7 (2022 05:16), Max: 100.5 (2022 10:44)  HR: 72 (2022 05:16) (70 - 104)  BP: 146/87 (2022 05:16) (110/69 - 148/80)  BP(mean): --  RR: 18 (2022 05:16) (16 - 18)  SpO2: 92% (2022 05:16) (91% - 96%)      22 @ 07:01  -  22 @ 07:00  --------------------------------------------------------  IN: 0 mL / OUT: 300 mL / NET: -300 mL    22 @ 07:01  -  22 @ 06:38  --------------------------------------------------------  IN: 0 mL / OUT: 1550 mL / NET: -1550 mL        PHYSICAL EXAM:    Head: NC/AT  Eyes: PERRL, EOMI, clear conjunctiva  ENT: no nasal discharge; uvula midline, no oropharyngeal erythema or exudates; MMM  Neck: supple; no JVD or thyromegaly  Respiratory: CTA B/L; no W/R/R, no retractions  Cardiac: +S1/S2; RRR; no M/R/G; PMI non-displaced  Gastrointestinal:  tender to palpation in luq-distended.  Extremities: WWP, no clubbing or cyanosis; no peripheral edema  Vascular: 2+ radial, femoral, DP/PT pulses B/L  Dermatologic: skin warm, dry and intact; no rashes, wounds, or scars  Lymphatic: no submandibular or cervical LAD  Neurologic: AAOx3; CNII-XII grossly intact; LE strength 0/5, sensation L > R. UE strength 5/5, sensation intact bilaterally. Reflexes mildly brisk in UE, severely increased in LE with upgoing toes b/l    MEDICATIONS:  MEDICATIONS  (STANDING):  acetaminophen     Tablet .. 650 milliGRAM(s) Oral every 24 hours  cefTRIAXone   IVPB 2000 milliGRAM(s) IV Intermittent every 24 hours  diphenhydrAMINE 25 milliGRAM(s) Oral every 24 hours  enoxaparin Injectable 40 milliGRAM(s) SubCutaneous every 24 hours  immune   globulin 10% (GAMMAGARD) IVPB 45 Gram(s) IV Intermittent every 24 hours  polyethylene glycol 3350 17 Gram(s) Oral daily  senna 1 Tablet(s) Oral at bedtime  tamsulosin 0.4 milliGRAM(s) Oral at bedtime    MEDICATIONS  (PRN):  acetaminophen     Tablet .. 650 milliGRAM(s) Oral every 6 hours PRN Temp greater or equal to 38C (100.4F), Mild Pain (1 - 3)      ALLERGIES:  Allergies    No Known Allergies    Intolerances        LABS:                        12.0   8.92  )-----------( 201      ( 2022 07:23 )             37.3     04-17    138  |  106  |  32<H>  ----------------------------<  80  4.0   |  21<L>  |  1.12    Ca    8.3<L>      2022 07:23  Phos  3.9     -  Mg     2.0     -    TPro  6.3  /  Alb  2.9<L>  /  TBili  0.4  /  DBili  x   /  AST  73<H>  /  ALT  91<H>  /  AlkPhos  87  04-17      Urinalysis Basic - ( 2022 11:18 )    Color: Yellow / Appearance: SL Cloudy / S.020 / pH: x  Gluc: x / Ketone: Trace mg/dL  / Bili: Negative / Urobili: 0.2 E.U./dL   Blood: x / Protein: NEGATIVE mg/dL / Nitrite: NEGATIVE   Leuk Esterase: Large / RBC: 5-10 /HPF / WBC Many /HPF   Sq Epi: x / Non Sq Epi: 0-5 /HPF / Bacteria: Present /HPF      CAPILLARY BLOOD GLUCOSE          RADIOLOGY & ADDITIONAL TESTS: Reviewed.

## 2022-04-18 NOTE — PHYSICAL THERAPY INITIAL EVALUATION ADULT - PERTINENT HX OF CURRENT PROBLEM, REHAB EVAL
71 M with no past medical history, not on any medications, transferred here from [facility] for bilateral lower extremity weakness, with concern for Transverse Myelitis vs Guillan [place], for plasma exchange. Patient's story dates back to week and a half ago, when he went to [place] for a vacation, developed nausea, vomiting, diarrhea and weakness, accompanied by abd distension and difficulty urinating for which he went to [facility]. refer to H&P for full details.

## 2022-04-18 NOTE — OCCUPATIONAL THERAPY INITIAL EVALUATION ADULT - ADDITIONAL COMMENTS
Pt lives w/ his wife in private house w/ 2 flights of stairs to negotiate. Pt states that he was fully independent and active (plays tennis x4 times/wk)  prior to symptoms. No prior use of AEs/DMEs reported by Pt.

## 2022-04-18 NOTE — DISCHARGE NOTE PROVIDER - NSDCFUSCHEDAPPT_GEN_ALL_CORE_FT
Ayo Bolton  [facility]  Neuro [address]  Scheduled Appointment: 06/03/2022

## 2022-04-18 NOTE — PROGRESS NOTE ADULT - ASSESSMENT
71M w/ PMHx BPH, recent h/o viral diarrhea x 1 week ago, c/o progressive weakness. Found urinary retention and transverse myelitis on MRI. Worsened despite IV steroids, transferred from [facility] for possible plasma exchange. Medicine consulted for fever.     #Bilateral Lower Extremity Weakness  - most likely Transverse myelitis , Received 3 days of steroids prior to transfer to [facility] from [facility] , started on IVIG per primary team on 4/16  - Further investigation and treatment per primary team     #Fever + Tachycardia   - on 4/17 intermittently tachycardic,  o2 sats on room air decreased to 93% on room air from 100% , Fever 100.5   - ct chest at [facility] which revealed plate like atelectasis in the Lingula   - UA +, s/p exchange of nation catheter ( placed at [facility] due to urinary retention )   - c/w Ceftriaxone, follow up urine cx   - ddimer elevated, check CTA    #Elevated Transaminases  - Ultrasound RUQ, without gall bladder disease , + for mild hepatic steatosis     #Small Pancreatic cystic mass  -  Negative  - GI consulted, planning for EUS and Biopsy    #Left sacral enhancement on L spine MRI  - check Lumbosacral CT prior to discharge to evaluate     DVT prophylaxis: Lovenox    71M w/ PMHx BPH, recent h/o viral diarrhea x 1 week ago, c/o progressive weakness. Found urinary retention and transverse myelitis on MRI. Worsened despite IV steroids, transferred from [facility] for possible plasma exchange. Medicine consulted for fever.     #Bilateral Lower Extremity Weakness  - most likely Transverse myelitis , Received 3 days of steroids prior to transfer to [facility] from [facility] , started on IVIG per primary team on 4/16  - Further investigation and treatment per primary team     #Fever + Tachycardia   - on 4/17 intermittently tachycardic,  o2 sats on room air decreased to 93% on room air from 100% , Fever 100.5   - ct chest at [facility] which revealed plate like atelectasis in the Lingula   - UA +, s/p exchange of nation catheter ( placed at [facility] due to urinary retention )   - c/w Ceftriaxone, follow up urine cx   - ddimer elevated, check CTA    #Elevated Transaminases  - Ultrasound RUQ, without gall bladder disease , + for mild hepatic steatosis     #Small Pancreatic cystic mass  -  Negative  - GI consulted, planning for EUS and Biopsy    DVT prophylaxis: Lovenox    71M w/ PMHx BPH, recent h/o viral diarrhea x 1 week ago, c/o progressive weakness. Found urinary retention and transverse myelitis on MRI. Worsened despite IV steroids, transferred from [facility] for possible plasma exchange. Medicine consulted for fever.     #Bilateral Lower Extremity Weakness  - most likely Transverse myelitis , Received 3 days of steroids prior to transfer to [facility] from [facility] , started on IVIG per primary team on 4/16  - Further investigation and treatment per primary team     #Fever + Tachycardia   - on 4/17 intermittently tachycardic,  o2 sats on room air decreased to 93% on room air from 100% , Fever 100.5   - ct chest at [facility] which revealed plate like atelectasis in the Lingula   - UA +, s/p exchange of nation catheter ( placed at [facility] due to urinary retention )   - c/w Ceftriaxone total 5d course, collect urine cx   - ddimer elevated, CTA prelim read negative    #Elevated Transaminases  - Ultrasound RUQ, without gall bladder disease , + for mild hepatic steatosis     #Small Pancreatic cystic mass  -  Negative  - GI consulted, planning for EUS and Biopsy    DVT prophylaxis: Lovenox

## 2022-04-18 NOTE — DISCHARGE NOTE PROVIDER - HOSPITAL COURSE
#Discharge: do not delete    71 M with no past medical history, not on any medications, transferred here from [facility] for bilateral lower extremity weakness, with concern for Transverse Myelitis    1. Lower extremity weakness.   - Given history of diarrhea two weeks prior to LE weakness, ddx includes GBS versus transverse myelitis-imaging consistent with transverse myelitis. Lumbar tap done at [facility]-for which studies pending. There, ESR and CRP elevated. Patient finished three days of high dose steroids with minimal improvement in symptoms.   - c/w IVIG 0.65g/kg x3 days, possible plasma exchange  - MR head and CSpine w/wout - faint T2 hyperintensity in posterior lower cervical cord without definite enhancement   - NMO and anti-MOG antibodies.    2. Pancreatic mass.   - CT AP 2.3 x 2.4 x 3.1 at pancreatic head, as per records, workup initiated at [facility]; however, unsure of results.  - Ca-19-9 and CEA negative at [facility]  - MR abdomen/pelvis w cont - pancreatic mass indeterminate etiology   - GI consult - tentative plan for EGD/EUS.    3. Leukocytosis.   - Presented with WBC of 16, elevated at [facility] as well. Could be 2/2 infection-history of UTI at [facility] treated with 5 day course of CTX, denying any complaints. No cough, no persistent GI symptoms, no diarrhea, no vomiting. Could be 2/2 reactive process given neurological symptomology. Could also be 2/2 prednisone.   - UA positive, nation exchanged   - CXR at [facility] clear without infiltrates  - Patient currently constipated-unlikely to be GI source.  - started on ceftriaxone 2g q24 hrs.    4. Transaminitis.   - Elevation in AST ALT, could be 2/2 pancreatic mass versus autoimmune etiology. No significant drinking or drug history. No tenderness on exam, no organomegaly.   -follow malignancy workup as above.    5. Urinary retention.   - Patient initially to [facility] with urosepsis c/b urinary retention, likely 2/2 neurological process. Nation placed at [facility].   -continue with flomax 0.4 QHS   -neuro workup as above.    6. R/O Pulmonary embolism.   - pt was tachycardia, d-dimer elevated but trending down   - CT angio negative for PE.    New medications: XXX  Labs to be followed outpatient: XXX  Exam to be followed outpatient: neuro exam    #Discharge: do not delete    71 M with no past medical history, not on any medications, transferred here from [facility] for bilateral lower extremity weakness, with concern for Transverse Myelitis. Neurology consulted. Received 3 days high dose steroids and 3 days with minimal improvement in symptoms. At the time, patient reported diminished sensation from umbilicus downwards aand completely inability to move legs  Patient started on PLEX via HD Cath with heme onc. Patient completed 7 sessions of PLEX. Strength greatly improved with PLEX therapy with patient now able to lift legs against gravity. However, sensation is still diminished. Also started on high dose oral steroid course on 4/24 with 60mg PO prednisone on week 1, decreasing by 10mg every week. Currently on 50mg PO daily this week 5/2. Patient failed TOV on 4/25, will keep nation in place and have patient TOV at acute rehab.        Problem/Plan - 1:  ·  Problem: Lower extremity weakness.   ·  Plan: Hx diarrhea two weeks prior to acute LE weakness, imaging consistent with transverse myelitis. Elevated inflammatory markers. Lumbar tap done at [facility]-for which studies pending. MR head and CSpine w/wout - faint T2 hyperintensity in posterior lower cervical cord without definite enhancement. NMO and anti-MOG antibodies negative  - s/p x3d high dose steroids with minimal improvement in symptoms.   - s/p IVIG 0.65g/kg x3 days with minimal improvement in symptoms.  - Autoimmune myelopathy panel sent 4/20 6pm  - HD cath placed 4/19 for PLEX starting 4/20 x7 sessions every other day with heme onc--patient completed course on 5/2 with improvement in LE strength   - started 60mg PO prednisone daily on 4/24 - decrease by 10mg every week--currently 50mg this week of 5/1   - failed TOV on 4/25, will continue nation, will do TOV at acute rehab  - to be discharged to acute rehab once treatment is complete--pending auth.     Problem/Plan - 2:  ·  Problem: Pancreatic mass.   ·  Plan: CT AP 2.3 x 2.4 x 3.1 at pancreatic head, as per records, workup initiated at [facility]; however, unsure of results.  - Ca-19-9 and CEA negative at [facility]   - MR abdomen/pelvis w cont - pancreatic mass indeterminate etiology    - f/u GI recs  - per GI, EGD/EUS procedure to be performed after plasmapheresis and acute neurologic issues resolved. Will defer to outpatient as elective procedure.     Problem/Plan - 3:  ·  Problem: Transaminitis.   ·  Plan: RESOLVED  Downtrending LFTs, likely 2/2 pancreatic mass versus autoimmune etiology. No significant drinking or drug history. No tenderness on exam, no organomegaly.   - follow malignancy workup as above.  - f/u titers and PCR's ordered per GI.     Problem/Plan - 4:  ·  Problem: Urinary retention.   ·  Plan: Patient initially to [facility] with urosepsis c/b urinary retention, likely 2/2 neurological process. Nation placed at [facility].   -continue with flomax 0.4 QHS   -neuro workup as above  - patient nation'ed on 4/22 due to retention   - failed TOV on 4/25, will continue nation, will do TOV at acute rehab      #Fecal retention  Passing gas, incontinent at times, difficulty controlling BMs  - lactulose 5g daily   - miralax 17g daily   - senna 1 tablet qhs     #Complicated UTI   RESOLVED   s/p CTX  - UClx growing >100K ESBL E. Coli, completed Ertapenem 1g q24h x5days (4/20-4/24).        New medications: XXX  Labs to be followed outpatient: XXX  Exam to be followed outpatient: neuro exam       PHYSICAL EXAM:    General: NAD, well developed  HEENT: NC/AT; EOMI, PERRLA, anicteric sclera; moist mucosal membranes.  Neck: supple, trachea midline  Cardiovascular: RRR, +S1/S2; NO M/R/G  Respiratory: CTA B/L; no W/R/R  Gastrointestinal: soft, NT/ND; +BSx4  Extremities: WWP; no edema or cyanosis  Vascular: 2+ radial, DP/PT pulses B/L  Neurological: AAOx3; Right quad near full power, hamstring 4+, left quad 4+ to 5- and left hamstring 4+.  Right EHL and dorsiflexion 5/5, left 4/5   #Discharge: do not delete    71 M with no past medical history, not on any medications, transferred here from [facility] for bilateral lower extremity weakness, with concern for Transverse Myelitis. Neurology consulted. Received 3 days high dose steroids and 3 days with minimal improvement in symptoms. At the time, patient reported diminished sensation from umbilicus downwards aand completely inability to move legs  Patient started on PLEX via HD Cath with heme onc. Patient completed 7 sessions of PLEX. Strength greatly improved with PLEX therapy with patient now able to lift legs against gravity. However, sensation is still diminished. Also started on high dose oral steroid course on 4/24 with 60mg PO prednisone on week 1, decreasing by 10mg every week. Currently on 50mg PO daily this week 5/2. Patient failed TOV on 4/25, will keep nation in place and have patient TOV at acute rehab.        Problem/Plan - 1:  ·  Problem: Lower extremity weakness.   ·  Plan: Hx diarrhea two weeks prior to acute LE weakness, imaging consistent with transverse myelitis. Elevated inflammatory markers. Lumbar tap done at [facility]-for which studies pending. MR head and CSpine w/wout - faint T2 hyperintensity in posterior lower cervical cord without definite enhancement. NMO and anti-MOG antibodies negative  - s/p x3d high dose steroids with minimal improvement in symptoms.   - s/p IVIG 0.65g/kg x3 days with minimal improvement in symptoms.  - Autoimmune myelopathy panel sent 4/20 6pm  - HD cath placed 4/19 for PLEX starting 4/20 x7 sessions every other day with heme onc--patient completed course on 5/2 with improvement in LE strength   - started 60mg PO prednisone daily on 4/24 - decrease by 10mg every week--currently 50mg this week of 5/1   - failed TOV on 4/25, will continue nation, will do TOV at acute rehab  - to be discharged to acute rehab once treatment is complete--pending auth.     Problem/Plan - 2:  ·  Problem: Pancreatic mass.   ·  Plan: CT AP 2.3 x 2.4 x 3.1 at pancreatic head, as per records, workup initiated at [facility]; however, unsure of results.  - Ca-19-9 and CEA negative at [facility]   - MR abdomen/pelvis w cont - pancreatic mass indeterminate etiology    - f/u GI recs  - per GI, EGD/EUS procedure to be performed after plasmapheresis and acute neurologic issues resolved. Will defer to outpatient as elective procedure.     Problem/Plan - 3:  ·  Problem: Transaminitis.   ·  Plan: RESOLVED  Downtrending LFTs, likely 2/2 pancreatic mass versus autoimmune etiology. No significant drinking or drug history. No tenderness on exam, no organomegaly.   - follow malignancy workup as above.  - f/u titers and PCR's ordered per GI.     Problem/Plan - 4:  ·  Problem: Urinary retention.   ·  Plan: Patient initially to [facility] with urosepsis c/b urinary retention, likely 2/2 neurological process. Nation placed at [facility].   -continue with flomax 0.4 QHS   -neuro workup as above  - patient nation'ed on 4/22 due to retention   - failed TOV on 4/25, will continue nation, will do TOV at acute rehab      #Fecal retention  Passing gas, incontinent at times, difficulty controlling BMs  - lactulose 5g daily   - miralax 17g daily   - senna 1 tablet qhs     #Complicated UTI   RESOLVED   s/p CTX  - UClx growing >100K ESBL E. Coli, completed Ertapenem 1g q24h x5days (4/20-4/24).    New medications: prednisone, flomax, simethicone, senna/miralax  Labs to be followed outpatient: none  Exam to be followed outpatient: neuro exam       PHYSICAL EXAM:    General: NAD, well developed  HEENT: NC/AT; EOMI, PERRLA, anicteric sclera; moist mucosal membranes.  Neck: supple, trachea midline  Cardiovascular: RRR, +S1/S2; NO M/R/G  Respiratory: CTA B/L; no W/R/R  Gastrointestinal: soft, NT/ND; +BSx4  Extremities: WWP; no edema or cyanosis  Vascular: 2+ radial, DP/PT pulses B/L  Neurological: AAOx3; Right quad near full power, hamstring 4+, left quad 4+ to 5- and left hamstring 4+.  Right EHL and dorsiflexion 5/5, left 4/5   #Discharge: do not delete    71 M with no past medical history, not on any medications, transferred here from [facility] for bilateral lower extremity weakness, with concern for Transverse Myelitis. Neurology consulted. Received 3 days high dose steroids and 3 days with minimal improvement in symptoms. At the time, patient reported diminished sensation from umbilicus downwards aand completely inability to move legs  Patient started on PLEX via HD Cath with heme onc. Patient completed 7 sessions of PLEX. Strength greatly improved with PLEX therapy with patient now able to lift legs against gravity. However, sensation is still diminished. Also started on high dose oral steroid course on 4/24 with 60mg PO prednisone on week 1, decreasing by 10mg every week. Currently on 50mg PO daily this week 5/2. Patient failed TOV on 4/25, will keep nation in place and have patient TOV at acute rehab.        Problem/Plan - 1:  ·  Problem: Lower extremity weakness.   ·  Plan: Hx diarrhea two weeks prior to acute LE weakness, imaging consistent with transverse myelitis. Elevated inflammatory markers. Lumbar tap done at [facility]-for which studies pending. MR head and CSpine w/wout - faint T2 hyperintensity in posterior lower cervical cord without definite enhancement. NMO and anti-MOG antibodies negative  - s/p x3d high dose steroids with minimal improvement in symptoms.   - s/p IVIG 0.65g/kg x3 days with minimal improvement in symptoms.  - Autoimmune myelopathy panel sent 4/20 6pm  - HD cath placed 4/19 for PLEX starting 4/20 x7 sessions every other day with heme onc--patient completed course on 5/2 with improvement in LE strength   - started 60mg PO prednisone daily on 4/24 - decrease by 10mg every week--currently 50mg this week of 5/1   - failed TOV on 4/25, will continue nation, will do TOV at acute rehab  - to be discharged to acute rehab once treatment is complete--pending auth.     Problem/Plan - 2:  ·  Problem: Pancreatic mass.   ·  Plan: CT AP 2.3 x 2.4 x 3.1 at pancreatic head, as per records, workup initiated at [facility]; however, unsure of results.  - Ca-19-9 and CEA negative at [facility]   - MR abdomen/pelvis w cont - pancreatic mass indeterminate etiology    - f/u GI recs  - per GI, EGD/EUS procedure to be performed after plasmapheresis and acute neurologic issues resolved. Will defer to outpatient as elective procedure.     Problem/Plan - 3:  ·  Problem: Transaminitis.   ·  Plan: RESOLVED  Downtrending LFTs, likely 2/2 pancreatic mass versus autoimmune etiology. No significant drinking or drug history. No tenderness on exam, no organomegaly.   - follow malignancy workup as above.  - f/u titers and PCR's ordered per GI.     Problem/Plan - 4:  ·  Problem: Urinary retention.   ·  Plan: Patient initially to [facility] with urosepsis c/b urinary retention, likely 2/2 neurological process. Nation placed at [facility].   -continue with flomax 0.4 QHS   -neuro workup as above  - patient nation'ed on 4/22 due to retention   - failed TOV on 4/25, will continue nation, will do TOV at acute rehab      #Fecal retention  Passing gas, incontinent at times, difficulty controlling BMs  - lactulose 5g daily   - miralax 17g daily   - senna 1 tablet qhs     #Complicated UTI   RESOLVED   s/p CTX  - UClx growing >100K ESBL E. Coli, completed Ertapenem 1g q24h x5days (4/20-4/24).    New medications: prednisone, flomax, simethicone, senna/miralax  Labs to be followed outpatient: none  Exam to be followed outpatient: neuro exam       PHYSICAL EXAM:    General: NAD, well developed  HEENT: NC/AT; EOMI, PERRLA, anicteric sclera; moist mucosal membranes.  Neck: supple, trachea midline  Cardiovascular: RRR, +S1/S2; NO M/R/G  Respiratory: CTA B/L; no W/R/R  Gastrointestinal: soft, NT/ND; +BSx4  Extremities: WWP; no edema or cyanosis  Vascular: 2+ radial, DP/PT pulses B/L  Neurological: AAOx3; Right quad near full power, hamstring 4+, left quad 4+ to 5- and left hamstring 4+.  Right EHL and dorsiflexion 5/5, left 4/5

## 2022-04-18 NOTE — PHYSICAL THERAPY INITIAL EVALUATION ADULT - IMPAIRMENTS FOUND, PT EVAL
aerobic capacity/endurance/arousal, attention, and cognition/fine motor/gait, locomotion, and balance/gross motor/muscle strength/neuromotor development and sensory integration/posture/ROM/reflex integrity/sensory integrity

## 2022-04-18 NOTE — OCCUPATIONAL THERAPY INITIAL EVALUATION ADULT - PERTINENT HX OF CURRENT PROBLEM, REHAB EVAL
Lower extremity numbness and weakness w/ concern for Transverse Myelitis. Incidental find - +Pancreatic lesion on CT, Transaminitis.

## 2022-04-18 NOTE — CONSULT NOTE ADULT - SUBJECTIVE AND OBJECTIVE BOX
Patient is a 71y old  Male who presents with a chief complaint of Bilateral lower extremity weakness (2022 12:49)       HPI:  71 M with no past medical history, not on any medications, transferred here from [facility] for bilateral lower extremity weakness, with concern for Transverse Myelitis vs [place] [employer] [facility], for plasma exchange. Patient's story dates back to week and a half ago, when he went to [place] for a vacation, developed nausea, vomiting, diarrhea and weakness, accompanied by abd distension and difficulty urinating for which he went to [facility]. There, he was found to be in urosepsis, complicated by urinary obstruction for which Marie Catheter was placed. On day prior to planned discharge , he started to develop severe paresthesia, numbness, and weakness of bilateral lower extremities. He underwent abdominal imaging and MRI of spine at [facility], and was found to have pancreatic lesion at pancreatic head, and MRI thoracic spine consistent with transverse myelitis. He then was given high dose steroids for three days, which provided minimal improvement of symptoms. As a result, patient transferred to [facility] for possible plasma exchange.   Of note-patient travelled to [place] and [place] in November, for which he received a series of immunizations that he does not recall.     Currently, he is endorsing weakness, tingling, and numbness of his bilateral lower extremities up until the point of his umbilicus. He is denying any other symptoms of his UE, notes no weakness, paresthesias, tingling. Currently only complaint is abdominal pain and constipation.      Relevant imaging/studies from [facility]:  CT AP: Ill defined low density lesion within pancreatic head represent a benign or malignant neoplasm. Measures 2.2 x 2.4 x 3.1 cm which may represent a benign or malignant neoplasm, pancreatic lesion partially compresses the portal vein and superior aspect of SMV. Bowel: Mild diffuse wall thickening of the ascending transverse and upper half of the descending colon which may relate to possible mild non specific colitis.   MRI Thoracic:  Borderline abn signal within the central cord substance throughout entire length of thoracic spine could reflect transverse myelitis.   ESR- 30 CRP- 14.2  CSF total protein 80, CSF PCR negative, others pending (15 Apr 2022 20:38)      PAST MEDICAL & SURGICAL HISTORY:      MEDICATIONS  (STANDING):  acetaminophen     Tablet .. 650 milliGRAM(s) Oral every 24 hours  cefTRIAXone   IVPB 2000 milliGRAM(s) IV Intermittent every 24 hours  diphenhydrAMINE 25 milliGRAM(s) Oral every 24 hours  enoxaparin Injectable 40 milliGRAM(s) SubCutaneous every 24 hours  immune   globulin 10% (GAMMAGARD) IVPB 45 Gram(s) IV Intermittent every 24 hours  polyethylene glycol 3350 17 Gram(s) Oral daily  potassium chloride    Tablet ER 20 milliEquivalent(s) Oral once  senna 1 Tablet(s) Oral at bedtime  tamsulosin 0.4 milliGRAM(s) Oral at bedtime    MEDICATIONS  (PRN):  acetaminophen     Tablet .. 650 milliGRAM(s) Oral every 6 hours PRN Temp greater or equal to 38C (100.4F), Mild Pain (1 - 3)        FAMILY HISTORY:  Family history of breast cancer in mother (Sibling)    FH: multiple myeloma (Mother)      CBC Full  -  ( 2022 06:52 )  WBC Count : 9.14 K/uL  RBC Count : 4.12 M/uL  Hemoglobin : 11.9 g/dL  Hematocrit : 36.2 %  Platelet Count - Automated : 195 K/uL  Mean Cell Volume : 87.9 fl  Mean Cell Hemoglobin : 28.9 pg  Mean Cell Hemoglobin Concentration : 32.9 gm/dL  Auto Neutrophil # : 7.60 K/uL  Auto Lymphocyte # : 0.99 K/uL  Auto Monocyte # : 0.36 K/uL  Auto Eosinophil # : 0.03 K/uL  Auto Basophil # : 0.01 K/uL  Auto Neutrophil % : 83.3 %  Auto Lymphocyte % : 10.8 %  Auto Monocyte % : 3.9 %  Auto Eosinophil % : 0.3 %  Auto Basophil % : 0.1 %          135  |  102  |  20  ----------------------------<  100<H>  3.8   |  25  |  1.14    Ca    8.0<L>      2022 06:52  Phos  3.4     04-18  Mg     2.2     -18    TPro  6.6  /  Alb  2.5<L>  /  TBili  0.5  /  DBili  x   /  AST  129<H>  /  ALT  214<H>  /  AlkPhos  112  04-18      Urinalysis Basic - ( 2022 11:18 )    Color: Yellow / Appearance: SL Cloudy / S.020 / pH: x  Gluc: x / Ketone: Trace mg/dL  / Bili: Negative / Urobili: 0.2 E.U./dL   Blood: x / Protein: NEGATIVE mg/dL / Nitrite: NEGATIVE   Leuk Esterase: Large / RBC: 5-10 /HPF / WBC Many /HPF   Sq Epi: x / Non Sq Epi: 0-5 /HPF / Bacteria: Present /HPF          Radiology:    < from: MR Head w/wo IV Cont (22 @ 18:30) >    ACC: [id number] EXAM:  MR BRAIN WAW IC                          PROCEDURE DATE:  2022          INTERPRETATION:  Kiersten GRAY MD, have reviewed the images and the report   and agree with the findings.    [facility] RADIOLOGIST PRELIMINARY REPORT      Initial report created on 2022 8:08:23 PM EDT  PROCEDURE INFORMATION:  Exam: MR Head Without and With Contrast  Exam date and time: 2022 4:49 PM  Age: 71 years old  Clinical indication: Other: Neuro pathology, evaluate anatomy. New onset   le  weakness    TECHNIQUE:  Imaging protocol: MR of the head without and with intravenous contrast.    COMPARISON:  No relevant prior studies available.    FINDINGS:  Brain: No evidence of abnormal diffusion/ADC signal of the brain   parenchyma.  Minimal hyperintense punctate foci T2 signal involving the white matter  bilaterally.  Cerebral ventricles: Normal. No ventriculomegaly.  Bones/joints: Unremarkable.  Paranasal sinuses: Normal as visualized. No acute sinusitis.  Mastoid air cells: Mild left mastoid fluid.  Orbital cavities: Unremarkable.  Soft tissues: Unremarkable.  Vasculature: Vasculature: Normal vascular enhancement.    IMPRESSION:  1. No evidence of acute large vessel cerebral infarction.  2. Minimal bilateral white matter signal changes most compatible with   cerebral  leukoencephalopathy related to chronic small vessel ischemic disease.  3. Mild left mastoid fluid.      < from: MR Cervical Spine w/wo IV Cont (22 @ 18:30) >    ACC: [id number] EXAM:  MR SPINE CERVICAL WAW IC                          PROCEDURE DATE:  2022          INTERPRETATION:  Kiersten GRAY MD, have reviewed the images and the report   and agree with the findings with the additional modification:      No evidence of abnormal signal changes within the spinal cord. No   evidence of abnormal enhancement.    At the C5/C6 level there is a disc osteophyte complex contacting the   ventral spinal cord, right greater than left. There is bilateral   uncovertebral and facet hypertrophy. There is moderate to severe   bilateral foraminal narrowing.    At the C6/C7 level there is a mild diffuse disc bulge flattening the   ventral thecal sac. This bilateral uncovertebral and facet hypertrophy.   There is mild right and severe left foraminal narrowing. There is no   evidence of spinal        [facility] RADIOLOGIST PRELIMINARY REPORT      Initial report created on 2022 8:22:47 PM EDT  PROCEDURE INFORMATION:  Exam: MR Cervical Spine With Contrast  Exam date and time: 2022 5:17 PM  Age: 71 years old  Clinical indication: Other: Neuro pathology, evaluate anatomy. New onset   le  weakness    TECHNIQUE:  Imaging protocol: Multiplanar magnetic resonance images of the cervical   spine  with contrast.    COMPARISON:  MR BRAIN WITHOUT AND WITH IV CONTRAST 2022 4:49 PM    FINDINGS:  Vertebrae: Heterogenous signal intensity of the cervical vertebral bodies.  Normal cervical spine alignment with maintained curvature. Multilevel   cervical  spondylosis with mild posterior disc marginal osteophytes at all levels  particularly C5-6, C6-7 levels, mildly effacing the ventral thecal sac.  Multilevel bilateral facet hypertrophy. Mild-moderate bilateral neural  foraminal narrowing at C5-C6 and C6-C7.  Spinal cord: Cervical cord appears normal in signal and caliber.  Soft tissues: Paraspinal soft tissues normal.  Brain: Vasculature: Normal vascular enhancement.  Vertebral arteries: Expected flow voids in the vertebral arteries.    IMPRESSION:  1. No evidence of acute fracture or malalignment.  2. No evidence of abnormal cervical cord enhancement/lesion.  3. Multilevel cervical spondylosis and facet osteoarthrosis as described   above.  4. Possible underlying osteopenia.      < from: MR Abdomen w/wo IV Cont (22 @ 18:30) >  ACC: [id number] EXAM:  MR ABDOMEN WAW IC                          PROCEDURE DATE:  2022    ******PRELIMINARY REPORT******      ******PRELIMINARY REPORT******           INTERPRETATION:  [facility] RADIOLOGIST PRELIMINARY REPORT    Initial report created on 2022 8:39:28 PM EDT  PROCEDURE INFORMATION:  Exam: MR Abdomen Without and With Contrast  Exam date and time: 2022 6:11 PM  Age: 71 years old  Clinical indication:Other: Evaluate for potential neoplasm    TECHNIQUE:  Imaging protocol: MR of the abdomen without and with intravenous contrast.    COMPARISON:  US ABDOMEN LIMITED 2022 9:31 AM    FINDINGS:  Lungs: Mild signal bilateral lower lobes-suspect atelectasis. Minimal   bilateral pleural effusions.    Liver: No mass.  Gallbladder and bile ducts: Gallbladder appears contracted. Common bile   duct diffusely prominent measuring up to 3-5 mm, upper normal for this   age patient without obstructing lesion.  Pancreas: Ovoid multi-cystic structure within the uncinate process   measuring up to 2.4 x 3.5 x 2 cm; no enhancement of the internal   septations/wall of the cystic structure, no evidence of mural nodule and   no evidence of communication with the main pancreatic duct. Small 4 mm   cyst body of the pancreas. Mild diffuse prominence of the pancreatic   duct-dilated up to 5 mm in body and head; duct measures 3 mm in distal   body-tail.  Spleen: Unremarkable. No splenomegaly.  Adrenal glands: Unremarkable. No mass.  Kidneys and ureters: Mild bilateral perinephric fat stranding.  Stomach and bowel: Visualized stomach and intestines are unremarkable.  Intraperitoneal space: No free fluid.  Arteries: Mild atherosclerotic calcification of the aortoiliac   vasculature.  Bones/joints: Chronic degenerative changes of the lumbar spine.  Soft tissues: Unremarkable.    IMPRESSION:  1. Multicystic structure within the uncinate process of the pancreas,   indeterminate etiology. No significant enhancement of internal   septations/wall, no mural nodule and no evidence of   obstruction/involvement of the pancreatic duct which is diffusely   prominent in this age patient.  2. Mild signal bilateral lower lobes-suspect atelectasis. Minimal   bilateralpleural effusions.            Vital Signs Last 24 Hrs  T(C): 37.1 (2022 05:16), Max: 38.1 (2022 10:44)  T(F): 98.7 (2022 05:16), Max: 100.5 (2022 10:44)  HR: 72 (2022 05:16) (70 - 104)  BP: 146/87 (2022 05:16) (110/69 - 148/80)  BP(mean): --  RR: 18 (2022 05:16) (16 - 18)  SpO2: 92% (2022 05:16) (91% - 96%)        REVIEW OF SYSTEMS:    CONSTITUTIONAL: No fever, weight loss, or fatigue  EYES: No eye pain, visual disturbances, or discharge  ENMT:  No difficulty hearing, tinnitus, vertigo; No sinus or throat pain  NECK: No pain or stiffness  BREASTS: No pain, masses, or nipple discharge  RESPIRATORY: No cough, wheezing, chills or hemoptysis; No shortness of breath  CARDIOVASCULAR: No chest pain, palpitations, dizziness, or leg swelling  GASTROINTESTINAL: No abdominal or epigastric pain. No nausea, vomiting, or hematemesis; No diarrhea or constipation. No melena or hematochezia.  GENITOURINARY: No dysuria, frequency, hematuria, or incontinence  NEUROLOGICAL: per HPI  SKIN: No itching, burning, rashes, or lesions   LYMPH NODES: No enlarged glands  ENDOCRINE: No heat or cold intolerance; No hair loss  MUSCULOSKELETAL: No joint pain or swelling; No muscle, back, or extremity pain  PSYCHIATRIC: No depression, anxiety, mood swings, or difficulty sleeping  HEME/LYMPH: No easy bruising, or bleeding gums  ALLERGY AND IMMUNOLOGIC: No hives or eczema  VASCULAR: no swelling, erythema,           Physical Exam:   72 yo  gentleman  lying in semi Goode's position, awake, alert,  c/o bilateral leg weakness    Head: normocephalic, atraumatic    Eyes: PERRLA, EOMI, no nystagmus, sclera anicteric    ENT: nasal discharge, uvula midline, no oropharyngeal erythema/exudate    Neck: supple, negative JVD, negative carotid bruits, no thyromegaly    Chest: CTA bilaterally, neg wheeze/ rhonchi/ rales/ crackles/ egophany    Cardiovascular: regular rate and rhythm, neg murmurs/rubs/gallops    Abdomen: soft, non distended, non tender to palpation in all 4 quadrants, negative rebound/guarding, normal bowel sounds    Extremities: WWP, neg cyanosis/clubbing/edema, negative calf tenderness to palpation, negative Dima's sign    Rectal: normal tone, no perineum sensory loss    Neurologic Exam:    Alert and oriented  to person, place, date/year, speech fluent w/o dysarthria, follows commands, recent and remote memory intact, repetition intact, comprehension intact,  attention/concentration intact, fund of knowledge appropriate    Cranial Nerves:     II:                         pupils equal, round and reactive to light, visual fields intact   III/ IV/VI:              extraocular movements intact, neg nystagmus, neg ptosis  V:                        facial sensation intact, V1-3 normal  VII:                      face symmetric, no droop, normal eye closure and smile  VIII:                     hearing intact to finger rub bilaterally  IX and X:             no hoarseness, gag intact, palate/ uvula rise symmetrically  XI:                       SCM/ trapezius strength intact bilateral  XII:                      no tongue deviation    Motor Exam:    Right UE:             : 5/5                             wrist extensors/ flexors: 5/5                             biceps:   5/5                             triceps:  5/5                             deltoid:  5/5                             pronator drift: neg    Left UE:               :  5/5                             wrist extensors/ flexors:  5/5                             biceps:    5/5                             triceps:   5/5                             deltoid:  5/5                             pronator drift: neg      Right LE:             plegic    Left LE:               plegic               Sensation:          ~ T8 sensory level                       DTR:                  biceps/brachioradialis: equal                                                      patella/ankle: brisk and equal                                                      pos bilateral Babinski                        Gait:  not tested              PM&R Impression:    1) transverse myelitis    Recommendations/ Plan :    1) Physical / Occupational therapy focusing on therapeutic exercises, bed mobility/transfer out of bed evaluation, progressive ambulation with assistive devices prn.    2) Anticipated Disposition Plan/Recs:   acute rehab placement

## 2022-04-18 NOTE — DISCHARGE NOTE PROVIDER - CARE PROVIDERS DIRECT ADDRESSES
[email or web address]

## 2022-04-18 NOTE — DISCHARGE NOTE PROVIDER - NSDCMRMEDTOKEN_GEN_ALL_CORE_FT
cyanocobalamin 1000 mcg oral tablet: 1 tab(s) orally once a day  gabapentin 100 mg oral capsule: 1 cap(s) orally 3 times a day  lactulose 10 g/15 mL oral syrup: 7.5 milliliter(s) orally once a day  melatonin 5 mg oral tablet: 1 tab(s) orally once a day (at bedtime)  pantoprazole 40 mg oral delayed release tablet: 1 tab(s) orally 2 times a day  polyethylene glycol 3350 oral powder for reconstitution: 17 gram(s) orally once a day  predniSONE 50 mg oral tablet: 1 tab(s) orally every day in the week of May 2nd, and decrease dosage by 10 mg every subsequent week  Ex: 40 mg prednisone every day starting May 9th followed by 30 mg prednisone every day starting May 16th followed by 20 mg prednisone every day starting May 23rd, followed by 10 mg prednisone every day starting  May 30th until June 5th 2022.   senna oral tablet: 1 tab(s) orally once a day (at bedtime)  simethicone 80 mg oral tablet, chewable: 1 tab(s) orally 2 times a day  tamsulosin 0.4 mg oral capsule: 1 cap(s) orally once a day (at bedtime)   cyanocobalamin 1000 mcg oral tablet: 1 tab(s) orally once a day  gabapentin 100 mg oral capsule: 1 cap(s) orally 3 times a day  lactulose 10 g/15 mL oral syrup: 7.5 milliliter(s) orally once a day  melatonin 5 mg oral tablet: 1 tab(s) orally once a day (at bedtime)  pantoprazole 40 mg oral delayed release tablet: 1 tab(s) orally 2 times a day  polyethylene glycol 3350 oral powder for reconstitution: 17 gram(s) orally once a day  predniSONE 20 mg oral tablet: 2 tab(s) orally once a day  decrease dosage by 10 mg every subsequent week  Ex: 40 mg prednisone every day starting May 9th followed by 30 mg prednisone every day starting May 16th followed by 20 mg prednisone every day starting May 23rd, followed by 10 mg prednisone every day starting  May 30th until June 5th 2022.   senna oral tablet: 1 tab(s) orally once a day (at bedtime)  simethicone 80 mg oral tablet, chewable: 1 tab(s) orally 2 times a day  tamsulosin 0.4 mg oral capsule: 1 cap(s) orally once a day (at bedtime)

## 2022-04-18 NOTE — DISCHARGE NOTE PROVIDER - PROVIDER TOKENS
PROVIDER:[TOKEN:[[id number]],FOLLOWUP:[2 weeks]] PROVIDER:[TOKEN:[[id number]],SCHEDULEDAPPT:[08/26/2022],SCHEDULEDAPPTTIME:[04:00 PM]] PROVIDER:[TOKEN:[[id number]]] PROVIDER:[TOKEN:[[id number]]],PROVIDER:[TOKEN:[[id number]],SCHEDULEDAPPT:[10/21/2022],SCHEDULEDAPPTTIME:[02:00 PM]] PROVIDER:[TOKEN:[[id number]],SCHEDULEDAPPT:[07/22/2022],SCHEDULEDAPPTTIME:[10:00 AM]],PROVIDER:[TOKEN:[[id number]],SCHEDULEDAPPT:[10/21/2022],SCHEDULEDAPPTTIME:[02:00 PM]]

## 2022-04-18 NOTE — PROGRESS NOTE ADULT - ATTENDING COMMENTS
Agree with assessment and plan as documented by resident.  --f/u urine cx ; continue ceftriaxone 5d for +UA ; has remained afebrile since 100.5 on 4/17 - continue to monitor fever curve  --increasing LFTs - f/u GI team for intervention  --CT PE negative

## 2022-04-18 NOTE — DISCHARGE NOTE PROVIDER - NSDCFUADDAPPT_GEN_ALL_CORE_FT
Please bring your Insurance card, Photo ID and Discharge paperwork to the following appointment:    (1) Please follow up with your Neurology Provider, Dr. Jon Corbett at [address] [place] [address], [address] [place] [address] on 08/26/2022 at 4:00pm.    Please note that the office of Dr. Corbett will contact you for an earlier appointment once available.     Appointment was scheduled by Ms. LENA Vincent, Referral Coordinator. Please follow up with Dr. Bolton per neurology team recommendation. The contact information is found in this discharge note.  Please follow up with Dr Bolton (neurology) and Dr Rojas (gastroenterology) at your scheduled appointment dates.

## 2022-04-18 NOTE — CONSULT NOTE ADULT - SUBJECTIVE AND OBJECTIVE BOX
LENGTH OF HOSPITAL STAY: 3d    CHIEF COMPLAINT: Weakness    HISTORY OF PRESENTING ILLNESS:   71 M with no past medical history, not on any medications, transferred here from [facility] for bilateral lower extremity weakness, with concern for Transverse Myelitis vs Guillan [place], for plasma exchange. Patient's story dates back to week and a half ago, when he went to [place] for a vacation, developed nausea, vomiting, diarrhea and weakness, accompanied by abd distension and difficulty urinating for which he went to [facility]. There, he was found to be in urosepsis, complicated by urinary obstruction for which Marie Catheter was placed. On day prior to planned discharge , he started to develop severe paresthesia, numbness, and weakness of bilateral lower extremities. He underwent abdominal imaging and MRI of spine at [facility], and was found to have pancreatic lesion at pancreatic head, and MRI thoracic spine consistent with transverse myelitis. He then was given high dose steroids for three days, which provided minimal improvement of symptoms. As a result, patient transferred to [facility] for possible plasma exchange.   Of note-patient travelled to [place] and [place] in November, for which he received a series of immunizations that he does not recall.     Currently, he is endorsing weakness, tingling, and numbness of his bilateral lower extremities up until the point of his umbilicus. He is denying any other symptoms of his UE, notes no weakness, paresthesias, tingling. Currently only complaint is abdominal pain and constipation.      Relevant imaging/studies from [facility]:  CT AP: Ill defined low density lesion within pancreatic head represent a benign or malignant neoplasm. Measures 2.2 x 2.4 x 3.1 cm which may represent a benign or malignant neoplasm, pancreatic lesion partially compresses the portal vein and superior aspect of SMV. Bowel: Mild diffuse wall thickening of the ascending transverse and upper half of the descending colon which may relate to possible mild non specific colitis.   MRI Thoracic:  Borderline abn signal within the central cord substance throughout entire length of thoracic spine could reflect transverse myelitis.   ESR- 30 CRP- 14.2  CSF total protein 80, CSF PCR negative, others pending (15 Apr 2022 20:38)    SOCIAL HISTORY:    ALLERGIES:  No Known Allergies    MEDICATIONS:  STANDING MEDICATIONS  acetaminophen     Tablet .. 650 milliGRAM(s) Oral every 24 hours  cefTRIAXone   IVPB 2000 milliGRAM(s) IV Intermittent every 24 hours  diphenhydrAMINE 25 milliGRAM(s) Oral every 24 hours  enoxaparin Injectable 40 milliGRAM(s) SubCutaneous every 24 hours  immune   globulin 10% (GAMMAGARD) IVPB 45 Gram(s) IV Intermittent every 24 hours  polyethylene glycol 3350 17 Gram(s) Oral daily  potassium chloride    Tablet ER 20 milliEquivalent(s) Oral once  senna 1 Tablet(s) Oral at bedtime  tamsulosin 0.4 milliGRAM(s) Oral at bedtime    PRN MEDICATIONS  acetaminophen     Tablet .. 650 milliGRAM(s) Oral every 6 hours PRN    VITALS:   T(F): 98.7  HR: 72  BP: 146/87  RR: 18  SpO2: 92%    LABS:                        11.9   9.14  )-----------( 195      ( 2022 06:52 )             36.2     04-18    135  |  102  |  20  ----------------------------<  100<H>  3.8   |  25  |  1.14    Ca    8.0<L>      2022 06:52  Phos  3.4       Mg     2.2         TPro  6.6  /  Alb  2.5<L>  /  TBili  0.5  /  DBili  x   /  AST  129<H>  /  ALT  214<H>  /  AlkPhos  112  -18    PT/INR - ( 2022 06:52 )   PT: 14.9 sec;   INR: 1.25       PTT - ( 2022 06:52 )  PTT:30.9 sec  Urinalysis Basic - ( 2022 11:18 )    Color: Yellow / Appearance: SL Cloudy / S.020 / pH: x  Gluc: x / Ketone: Trace mg/dL  / Bili: Negative / Urobili: 0.2 E.U./dL   Blood: x / Protein: NEGATIVE mg/dL / Nitrite: NEGATIVE   Leuk Esterase: Large / RBC: 5-10 /HPF / WBC Many /HPF   Sq Epi: x / Non Sq Epi: 0-5 /HPF / Bacteria: Present /HPF    Creatine Kinase, Serum: 133 U/L (22 @ 12:24)  Troponin T, Serum: 0.01 ng/mL (22 @ 12:24)    Culture - Blood (collected 2022 12:37)  Source: .Blood Blood  Preliminary Report (2022 01:01):    No growth at 12 hours    Culture - Blood (collected 2022 12:37)  Source: .Blood Blood  Preliminary Report (2022 01:01):    No growth at 12 hours    CARDIAC MARKERS ( 2022 12:24 )  x     / 0.01 ng/mL / 133 U/L / x     / 1.6 ng/mL  CARDIAC MARKERS ( 2022 07:23 )  x     / <0.01 ng/mL / 35 U/L / x     / 1.4 ng/mL    RADIOLOGY:  < from: MR Cervical Spine w/wo IV Cont (22 @ 18:30) >  No evidence of abnormal signal changes within the spinal cord. No   evidence of abnormal enhancement.    At the C5/C6 level there is a disc osteophyte complex contacting the   ventral spinal cord, right greater than left. There is bilateral   uncovertebral and facet hypertrophy. There is moderate to severe   bilateral foraminal narrowing.    At the C6/C7 level there is a mild diffuse disc bulge flattening the   ventral thecal sac. This bilateral uncovertebral and facet hypertrophy.   There is mild right and severe left foraminal narrowing. There is no   evidence of spinal    < end of copied text >    < from: CT Angio Chest PE Protocol w/ IV Cont (22 @ 16:48) >  1. No pulmonary embolism.  2. Mild bilateral basal dependent atelectasis . No focal consolidation.    < end of copied text >    < from: MR Abdomen w/wo IV Cont (22 @ 18:30) >  Complex cysticpancreatic head lesion. Ductal dilatation. The   differential includes mixed IPMN and atypical serous cystadenoma.   Endoscopic ultrasound and tissue sampling are recommended.    < end of copied text >    PHYSICAL EXAM:  GEN: No acute distress  HEENT: NCAT  LUNGS: Clear to auscultation bilaterally   HEART: S1/S2 present. RRR.   ABD: Soft, non-tender, non-distended. Bowel sounds present  EXT: No pitting edema, 0/5 lower extremities  NEURO: AAOX3     LENGTH OF HOSPITAL STAY: 3d    CHIEF COMPLAINT: Weakness    HISTORY OF PRESENTING ILLNESS:   71 M with no past medical history, not on any medications, transferred here from [facility] for bilateral lower extremity weakness, with concern for Transverse Myelitis vs Guillan [place], for plasma exchange. Patient's story dates back to week and a half ago, when he went to [place] for a vacation, developed nausea, vomiting, diarrhea and weakness, accompanied by abd distension and difficulty urinating for which he went to [facility]. There, he was found to be in urosepsis, complicated by urinary obstruction for which Marie Catheter was placed. On day prior to planned discharge , he started to develop severe paresthesia, numbness, and weakness of bilateral lower extremities. He underwent abdominal imaging and MRI of spine at [facility], and was found to have pancreatic lesion at pancreatic head, and MRI thoracic spine consistent with transverse myelitis. He then was given high dose steroids for three days, which provided minimal improvement of symptoms. As a result, patient transferred to [facility] for possible plasma exchange.   Of note-patient travelled to [place] and [place] in November, for which he received a series of immunizations that he does not recall.     Currently, he is endorsing weakness, tingling, and numbness of his bilateral lower extremities up until the point of his umbilicus. He is denying any other symptoms of his UE, notes no weakness, paresthesias, tingling. Currently only complaint is abdominal pain and constipation.      Relevant imaging/studies from [facility]:  CT AP: Ill defined low density lesion within pancreatic head represent a benign or malignant neoplasm. Measures 2.2 x 2.4 x 3.1 cm which may represent a benign or malignant neoplasm, pancreatic lesion partially compresses the portal vein and superior aspect of SMV. Bowel: Mild diffuse wall thickening of the ascending transverse and upper half of the descending colon which may relate to possible mild non specific colitis.   MRI Thoracic:  Borderline abn signal within the central cord substance throughout entire length of thoracic spine could reflect transverse myelitis.   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   Never a smoker. No recreational drugs. No alcohol.    ALLERGIES:  No Known Allergies    MEDICATIONS:  STANDING MEDICATIONS  acetaminophen     Tablet .. 650 milliGRAM(s) Oral every 24 hours  cefTRIAXone   IVPB 2000 milliGRAM(s) IV Intermittent every 24 hours  diphenhydrAMINE 25 milliGRAM(s) Oral every 24 hours  enoxaparin Injectable 40 milliGRAM(s) SubCutaneous every 24 hours  immune   globulin 10% (GAMMAGARD) IVPB 45 Gram(s) IV Intermittent every 24 hours  polyethylene glycol 3350 17 Gram(s) Oral daily  potassium chloride    Tablet ER 20 milliEquivalent(s) Oral once  senna 1 Tablet(s) Oral at bedtime  tamsulosin 0.4 milliGRAM(s) Oral at bedtime    PRN MEDICATIONS  acetaminophen     Tablet .. 650 milliGRAM(s) Oral every 6 hours PRN    VITALS:   T(F): 98.7  HR: 72  BP: 146/87  RR: 18  SpO2: 92%    LABS:                        11.9   9.14  )-----------( 195      ( 2022 06:52 )             36.2     04-18    135  |  102  |  20  ----------------------------<  100<H>  3.8   |  25  |  1.14    Ca    8.0<L>      2022 06:52  Phos  3.4     04-18  Mg     2.2     04-18    TPro  6.6  /  Alb  2.5<L>  /  TBili  0.5  /  DBili  x   /  AST  129<H>  /  ALT  214<H>  /  AlkPhos  112  04-18    PT/INR - ( 2022 06:52 )   PT: 14.9 sec;   INR: 1.25       PTT - ( 2022 06:52 )  PTT:30.9 sec  Urinalysis Basic - ( 2022 11:18 )    Color: Yellow / Appearance: SL Cloudy / S.020 / pH: x  Gluc: x / Ketone: Trace mg/dL  / Bili: Negative / Urobili: 0.2 E.U./dL   Blood: x / Protein: NEGATIVE mg/dL / Nitrite: NEGATIVE   Leuk Esterase: Large / RBC: 5-10 /HPF / WBC Many /HPF   Sq Epi: x / Non Sq Epi: 0-5 /HPF / Bacteria: Present /HPF    Creatine Kinase, Serum: 133 U/L (22 @ 12:24)  Troponin T, Serum: 0.01 ng/mL (22 @ 12:24)    Culture - Blood (collected 2022 12:37)  Source: .Blood Blood  Preliminary Report (2022 01:01):    No growth at 12 hours    Culture - Blood (collected 2022 12:37)  Source: .Blood Blood  Preliminary Report (2022 01:01):    No growth at 12 hours    CARDIAC MARKERS ( 2022 12:24 )  x     / 0.01 ng/mL / 133 U/L / x     / 1.6 ng/mL  CARDIAC MARKERS ( 2022 07:23 )  x     / <0.01 ng/mL / 35 U/L / x     / 1.4 ng/mL    RADIOLOGY:  < from: MR Cervical Spine w/wo IV Cont (22 @ 18:30) >  No evidence of abnormal signal changes within the spinal cord. No   evidence of abnormal enhancement.    At the C5/C6 level there is a disc osteophyte complex contacting the   ventral spinal cord, right greater than left. There is bilateral   uncovertebral and facet hypertrophy. There is moderate to severe   bilateral foraminal narrowing.    At the C6/C7 level there is a mild diffuse disc bulge flattening the   ventral thecal sac. This bilateral uncovertebral and facet hypertrophy.   There is mild right and severe left foraminal narrowing. There is no   evidence of spinal    < end of copied text >    < from: CT Angio Chest PE Protocol w/ IV Cont (22 @ 16:48) >  1. No pulmonary embolism.  2. Mild bilateral basal dependent atelectasis . No focal consolidation.    < end of copied text >    < from: MR Abdomen w/wo IV Cont (22 @ 18:30) >  Complex cysticpancreatic head lesion. Ductal dilatation. The   differential includes mixed IPMN and atypical serous cystadenoma.   Endoscopic ultrasound and tissue sampling are recommended.    < end of copied text >    PHYSICAL EXAM:  GEN: No acute distress  HEENT: NCAT  LUNGS: Clear to auscultation bilaterally   HEART: S1/S2 present. RRR.   ABD: Soft, non-tender, non-distended. Bowel sounds present  EXT: No pitting edema, 0/5 lower extremities  NEURO: AAOX3

## 2022-04-18 NOTE — PROGRESS NOTE ADULT - PROBLEM SELECTOR PLAN 3
Presented with WBC of 16, elevated at [facility] as well. Could be 2/2 infection-history of UTI at [facility] treated with 5 day course of CTX, denying any complaints. No cough, no persistent GI symptoms, no diarrhea, no vomiting. Could be 2/2 reactive process given neurological symptomology. Could also be 2/2 prednisone.   - UA positive, nation exchanged   - CXR at [facility] clear without infiltrates  - Patient currently constipated-unlikely to be GI source.  - started on ceftriaxone 2g q24 hrs

## 2022-04-19 ENCOUNTER — TRANSCRIPTION ENCOUNTER (OUTPATIENT)
Age: 72
End: 2022-04-19

## 2022-04-19 LAB
24R-OH-CALCIDIOL SERPL-MCNC: 10.7 NG/ML — LOW (ref 30–80)
A1AT SERPL-MCNC: 172 MG/DL — SIGNIFICANT CHANGE UP (ref 90–200)
A1C WITH ESTIMATED AVERAGE GLUCOSE RESULT: 5.9 % — HIGH (ref 4–5.6)
ALBUMIN SERPL ELPH-MCNC: 2.5 G/DL — LOW (ref 3.3–5)
ALP SERPL-CCNC: 112 U/L — SIGNIFICANT CHANGE UP (ref 40–120)
ALT FLD-CCNC: 130 U/L — HIGH (ref 10–45)
ANA TITR SER: NEGATIVE — SIGNIFICANT CHANGE UP
ANION GAP SERPL CALC-SCNC: 8 MMOL/L — SIGNIFICANT CHANGE UP (ref 5–17)
APTT BLD: 31.4 SEC — SIGNIFICANT CHANGE UP (ref 27.5–35.5)
AST SERPL-CCNC: 55 U/L — HIGH (ref 10–40)
BASOPHILS # BLD AUTO: 0.01 K/UL — SIGNIFICANT CHANGE UP (ref 0–0.2)
BASOPHILS NFR BLD AUTO: 0.2 % — SIGNIFICANT CHANGE UP (ref 0–2)
BILIRUB SERPL-MCNC: 0.3 MG/DL — SIGNIFICANT CHANGE UP (ref 0.2–1.2)
BUN SERPL-MCNC: 19 MG/DL — SIGNIFICANT CHANGE UP (ref 7–23)
CALCIUM SERPL-MCNC: 8 MG/DL — LOW (ref 8.4–10.5)
CALCIUM SERPL-MCNC: 8.1 MG/DL — LOW (ref 8.4–10.5)
CERULOPLASMIN SERPL-MCNC: 23 MG/DL — SIGNIFICANT CHANGE UP (ref 15–30)
CHLORIDE SERPL-SCNC: 102 MMOL/L — SIGNIFICANT CHANGE UP (ref 96–108)
CHOLEST SERPL-MCNC: 114 MG/DL — SIGNIFICANT CHANGE UP
CO2 SERPL-SCNC: 23 MMOL/L — SIGNIFICANT CHANGE UP (ref 22–31)
CREAT SERPL-MCNC: 1.04 MG/DL — SIGNIFICANT CHANGE UP (ref 0.5–1.3)
EGFR: 77 ML/MIN/1.73M2 — SIGNIFICANT CHANGE UP
EOSINOPHIL # BLD AUTO: 0.04 K/UL — SIGNIFICANT CHANGE UP (ref 0–0.5)
EOSINOPHIL NFR BLD AUTO: 0.6 % — SIGNIFICANT CHANGE UP (ref 0–6)
ESTIMATED AVERAGE GLUCOSE: 123 MG/DL — HIGH (ref 68–114)
FERRITIN SERPL-MCNC: 453 NG/ML — HIGH (ref 30–400)
FIBRINOGEN PPP-MCNC: 616 MG/DL — HIGH (ref 258–438)
GLUCOSE SERPL-MCNC: 109 MG/DL — HIGH (ref 70–99)
HAV IGM SER-ACNC: SIGNIFICANT CHANGE UP
HBV CORE AB SER-ACNC: REACTIVE
HBV CORE IGM SER-ACNC: SIGNIFICANT CHANGE UP
HBV SURFACE AB SER-ACNC: REACTIVE
HBV SURFACE AG SER-ACNC: SIGNIFICANT CHANGE UP
HCT VFR BLD CALC: 36.6 % — LOW (ref 39–50)
HCV AB S/CO SERPL IA: 0.08 S/CO — SIGNIFICANT CHANGE UP
HCV AB SERPL-IMP: SIGNIFICANT CHANGE UP
HDLC SERPL-MCNC: 29 MG/DL — LOW
HGB BLD-MCNC: 11.9 G/DL — LOW (ref 13–17)
HIV 1+2 AB+HIV1 P24 AG SERPL QL IA: SIGNIFICANT CHANGE UP
IMM GRANULOCYTES NFR BLD AUTO: 1.6 % — HIGH (ref 0–1.5)
INR BLD: 1.11 — SIGNIFICANT CHANGE UP (ref 0.88–1.16)
IRON SATN MFR SERPL: 34 % — SIGNIFICANT CHANGE UP (ref 16–55)
IRON SATN MFR SERPL: 46 UG/DL — SIGNIFICANT CHANGE UP (ref 45–165)
LIPID PNL WITH DIRECT LDL SERPL: 58 MG/DL — SIGNIFICANT CHANGE UP
LYMPHOCYTES # BLD AUTO: 0.81 K/UL — LOW (ref 1–3.3)
LYMPHOCYTES # BLD AUTO: 13 % — SIGNIFICANT CHANGE UP (ref 13–44)
MAGNESIUM SERPL-MCNC: 2.1 MG/DL — SIGNIFICANT CHANGE UP (ref 1.6–2.6)
MCHC RBC-ENTMCNC: 28.3 PG — SIGNIFICANT CHANGE UP (ref 27–34)
MCHC RBC-ENTMCNC: 32.5 GM/DL — SIGNIFICANT CHANGE UP (ref 32–36)
MCV RBC AUTO: 87.1 FL — SIGNIFICANT CHANGE UP (ref 80–100)
MONOCYTES # BLD AUTO: 0.37 K/UL — SIGNIFICANT CHANGE UP (ref 0–0.9)
MONOCYTES NFR BLD AUTO: 5.9 % — SIGNIFICANT CHANGE UP (ref 2–14)
NEUTROPHILS # BLD AUTO: 4.92 K/UL — SIGNIFICANT CHANGE UP (ref 1.8–7.4)
NEUTROPHILS NFR BLD AUTO: 78.7 % — HIGH (ref 43–77)
NON HDL CHOLESTEROL: 85 MG/DL — SIGNIFICANT CHANGE UP
NRBC # BLD: 0 /100 WBCS — SIGNIFICANT CHANGE UP (ref 0–0)
PHOSPHATE SERPL-MCNC: 2.5 MG/DL — SIGNIFICANT CHANGE UP (ref 2.5–4.5)
PLATELET # BLD AUTO: 180 K/UL — SIGNIFICANT CHANGE UP (ref 150–400)
POTASSIUM SERPL-MCNC: 4 MMOL/L — SIGNIFICANT CHANGE UP (ref 3.5–5.3)
POTASSIUM SERPL-SCNC: 4 MMOL/L — SIGNIFICANT CHANGE UP (ref 3.5–5.3)
PROT SERPL-MCNC: 7.3 G/DL — SIGNIFICANT CHANGE UP (ref 6–8.3)
PROTHROM AB SERPL-ACNC: 13.2 SEC — SIGNIFICANT CHANGE UP (ref 10.5–13.4)
PTH-INTACT FLD-MCNC: 50 PG/ML — SIGNIFICANT CHANGE UP (ref 15–65)
RBC # BLD: 4.2 M/UL — SIGNIFICANT CHANGE UP (ref 4.2–5.8)
RBC # FLD: 13.5 % — SIGNIFICANT CHANGE UP (ref 10.3–14.5)
SODIUM SERPL-SCNC: 133 MMOL/L — LOW (ref 135–145)
TIBC SERPL-MCNC: 137 UG/DL — LOW (ref 220–430)
TRIGL SERPL-MCNC: 134 MG/DL — SIGNIFICANT CHANGE UP
UIBC SERPL-MCNC: 91 UG/DL — LOW (ref 110–370)
WBC # BLD: 6.25 K/UL — SIGNIFICANT CHANGE UP (ref 3.8–10.5)
WBC # FLD AUTO: 6.25 K/UL — SIGNIFICANT CHANGE UP (ref 3.8–10.5)

## 2022-04-19 PROCEDURE — 36556 INSERT NON-TUNNEL CV CATH: CPT

## 2022-04-19 PROCEDURE — 99233 SBSQ HOSP IP/OBS HIGH 50: CPT

## 2022-04-19 PROCEDURE — 71045 X-RAY EXAM CHEST 1 VIEW: CPT | Mod: 26

## 2022-04-19 PROCEDURE — 99233 SBSQ HOSP IP/OBS HIGH 50: CPT | Mod: GC

## 2022-04-19 RX ORDER — CHLORHEXIDINE GLUCONATE 213 G/1000ML
1 SOLUTION TOPICAL
Refills: 0 | Status: DISCONTINUED | OUTPATIENT
Start: 2022-04-19 | End: 2022-05-03

## 2022-04-19 RX ORDER — SODIUM CHLORIDE 9 MG/ML
10 INJECTION INTRAMUSCULAR; INTRAVENOUS; SUBCUTANEOUS
Refills: 0 | Status: DISCONTINUED | OUTPATIENT
Start: 2022-04-19 | End: 2022-05-09

## 2022-04-19 RX ORDER — PANTOPRAZOLE SODIUM 20 MG/1
40 TABLET, DELAYED RELEASE ORAL
Refills: 0 | Status: DISCONTINUED | OUTPATIENT
Start: 2022-04-19 | End: 2022-05-09

## 2022-04-19 RX ORDER — LACTULOSE 10 G/15ML
10 SOLUTION ORAL EVERY 24 HOURS
Refills: 0 | Status: DISCONTINUED | OUTPATIENT
Start: 2022-04-19 | End: 2022-04-30

## 2022-04-19 RX ADMIN — PANTOPRAZOLE SODIUM 40 MILLIGRAM(S): 20 TABLET, DELAYED RELEASE ORAL at 17:58

## 2022-04-19 RX ADMIN — CEFTRIAXONE 100 MILLIGRAM(S): 500 INJECTION, POWDER, FOR SOLUTION INTRAMUSCULAR; INTRAVENOUS at 11:57

## 2022-04-19 RX ADMIN — LACTULOSE 10 GRAM(S): 10 SOLUTION ORAL at 11:57

## 2022-04-19 RX ADMIN — POLYETHYLENE GLYCOL 3350 17 GRAM(S): 17 POWDER, FOR SOLUTION ORAL at 11:56

## 2022-04-19 RX ADMIN — PREGABALIN 1000 MICROGRAM(S): 225 CAPSULE ORAL at 06:43

## 2022-04-19 RX ADMIN — TAMSULOSIN HYDROCHLORIDE 0.4 MILLIGRAM(S): 0.4 CAPSULE ORAL at 22:22

## 2022-04-19 RX ADMIN — Medication 58 MILLIGRAM(S): at 17:57

## 2022-04-19 RX ADMIN — ENOXAPARIN SODIUM 40 MILLIGRAM(S): 100 INJECTION SUBCUTANEOUS at 22:22

## 2022-04-19 NOTE — PROCEDURE NOTE - ADDITIONAL PROCEDURE DETAILS
+ bubble study with apical four chamber view on US +Lung siding on POCUS before and after procedure. Wire visualized in vessel in short and long axis prior to dilation. Line confirmed via subxiphoid view flush test on ultrasound. Follow up CXR.

## 2022-04-19 NOTE — PROGRESS NOTE ADULT - PROBLEM SELECTOR PLAN 5
Patient initially to [facility] with urosepsis c/b urinary retention, likely 2/2 neurological process. Marie placed at [facility].   -continue with flomax 0.4 QHS   -neuro workup as above. Patient initially to [facility] with urosepsis c/b urinary retention, likely 2/2 neurological process. Marie placed at [facility].   -continue with flomax 0.4 QHS   -neuro workup as above.    #Complicated UTI   - UCx growing >100K E. Coli, sensitivities to follow   - C/w ceftriaxone 2g q24hrs

## 2022-04-19 NOTE — PROGRESS NOTE ADULT - SUBJECTIVE AND OBJECTIVE BOX
MARCIAL ROMANO  71y  Male    Patient is a 71y old  Male who presents with a chief complaint of Bilateral lower extremity weakness (17 Apr 2022 12:49)      INTERVAL HPI/OVERNIGHT EVENTS: Pt complained of isolated L knee pain over night that has since resolved on its own. Denies any BMs for the last 4-5 days, denies any abd pain or N/V. Denies any fever/chills       T(C): 36.7 (04-19-22 @ 06:26), Max: 36.9 (04-18-22 @ 20:20)  HR: 75 (04-19-22 @ 06:26) (75 - 83)  BP: 155/89 (04-19-22 @ 06:26) (119/69 - 155/89)  RR: 18 (04-19-22 @ 06:26) (18 - 18)  SpO2: 95% (04-19-22 @ 06:26) (94% - 95%)  Wt(kg): --Vital Signs Last 24 Hrs  T(C): 36.7 (19 Apr 2022 06:26), Max: 36.9 (18 Apr 2022 20:20)  T(F): 98 (19 Apr 2022 06:26), Max: 98.5 (18 Apr 2022 20:20)  HR: 75 (19 Apr 2022 06:26) (75 - 83)  BP: 155/89 (19 Apr 2022 06:26) (119/69 - 155/89)  BP(mean): --  RR: 18 (19 Apr 2022 06:26) (18 - 18)  SpO2: 95% (19 Apr 2022 06:26) (94% - 95%)    PHYSICAL EXAM:  GENERAL: NAD  NERVOUS SYSTEM:  Alert & Oriented X3. 0/5 strength in B/L LE, 5/5 in B/L UEs. Sensation present on RLE>LLE.   CHEST/LUNG: Clear to auscultation bilaterally; No rales, rhonchi, wheezing, or rubs  HEART: Regular rate and rhythm; No murmurs, rubs, or gallops  ABDOMEN: Soft, Nontender, Nondistended; Bowel sounds present  EXTREMITIES:  WWP, No clubbing, cyanosis, or edema  Vascular: 2+ peripheral pulses x4  SKIN: No rashes or lesions    Consultant(s) Notes Reviewed:  [x ] YES  [ ] NO  Care Discussed with Consultants/Other Providers [ x] YES  [ ] NO    LABS:                        11.9   6.25  )-----------( 180      ( 19 Apr 2022 07:22 )             36.6     04-19    133<L>  |  102  |  19  ----------------------------<  109<H>  4.0   |  23  |  1.04    Ca    8.1<L>      19 Apr 2022 07:22  Phos  2.5     04-19  Mg     2.1     04-19    TPro  7.3  /  Alb  2.5<L>  /  TBili  0.3  /  DBili  x   /  AST  55<H>  /  ALT  130<H>  /  AlkPhos  112  04-19    Iron 46, TIBC 137, %Sat 34, Ferritin 453/ 04-19-22 @ 07:22    PT/INR - ( 19 Apr 2022 07:22 )   PT: 13.2 sec;   INR: 1.11          PTT - ( 19 Apr 2022 07:22 )  PTT:31.4 sec    CAPILLARY BLOOD GLUCOSE                RADIOLOGY & ADDITIONAL TESTS:    Imaging Personally Reviewed:  [ ] YES  [ ] NO    HEALTH ISSUES - PROBLEM Dx:  Lower extremity weakness    Pancreatic mass    Urinary retention    Preventive measure    Leukocytosis    Transaminitis

## 2022-04-19 NOTE — PROCEDURE NOTE - NSICDXPROCEDURE_GEN_ALL_CORE_FT
PROCEDURES:  Insertion, catheter, hemodialysis, non-tunneled, with US guidance 19-Apr-2022 19:52:14  Lacey Raza

## 2022-04-19 NOTE — PROGRESS NOTE ADULT - SUBJECTIVE AND OBJECTIVE BOX
CC: Patient is a 71y old  Male who presents with a chief complaint of Bilateral lower extremity weakness       INTERVAL EVENTS: ANDREA    SUBJECTIVE / INTERVAL HPI: Patient seen and examined at bedside. no cp/sob thia am, PE with slightly increased sensation to the lower extremities     ROS: negative unless otherwise stated above.    VITAL SIGNS:  Vital Signs Last 24 Hrs  T(C): 36.7 (2022 06:26), Max: 36.9 (2022 20:20)  T(F): 98 (2022 06:26), Max: 98.5 (2022 20:20)  HR: 75 (2022 06:26) (75 - 83)  BP: 155/89 (2022 06:26) (115/73 - 155/89)  BP(mean): --  RR: 18 (2022 06:26) (18 - 18)  SpO2: 95% (:26) (94% - 95%)      22 @ 07:01  -  22 @ 07:00  --------------------------------------------------------  IN: 0 mL / OUT: 2250 mL / NET: -2250 mL    22 @ 07:01  -  22 @ 06:43  --------------------------------------------------------  IN: 0 mL / OUT: 1450 mL / NET: -1450 mL        PHYSICAL EXAM:    Head: NC/AT  Eyes: PERRL, EOMI, clear conjunctiva  ENT: no nasal discharge; uvula midline, no oropharyngeal erythema or exudates; MMM  Neck: supple; no JVD or thyromegaly  Respiratory: CTA B/L; no W/R/R, no retractions  Cardiac: +S1/S2; RRR; no M/R/G; PMI non-displaced  Gastrointestinal:  tender to palpation in luq-distended.  Extremities: WWP, no clubbing or cyanosis; no peripheral edema  Vascular: 2+ radial, femoral, DP/PT pulses B/L  Dermatologic: skin warm, dry and intact; no rashes, wounds, or scars  Lymphatic: no submandibular or cervical LAD  Neurologic: AAOx3; CNII-XII grossly intact; LE strength 0/5, sensation L > R seems to be improving. UE strength 5/5, sensation intact bilaterally. Reflexes mildly brisk in UE, severely increased in LE with upgoing toes b/l    MEDICATIONS:  MEDICATIONS  (STANDING):  cefTRIAXone   IVPB 2000 milliGRAM(s) IV Intermittent every 24 hours  cyanocobalamin Injectable 1000 MICROGram(s) IntraMuscular once  enoxaparin Injectable 40 milliGRAM(s) SubCutaneous every 24 hours  polyethylene glycol 3350 17 Gram(s) Oral daily  senna 1 Tablet(s) Oral at bedtime  tamsulosin 0.4 milliGRAM(s) Oral at bedtime    MEDICATIONS  (PRN):  acetaminophen     Tablet .. 650 milliGRAM(s) Oral every 6 hours PRN Temp greater or equal to 38C (100.4F), Mild Pain (1 - 3)      ALLERGIES:  Allergies    No Known Allergies    Intolerances        LABS:                        11.9   9.14  )-----------( 195      ( 2022 06:52 )             36.2     04-18    135  |  102  |  20  ----------------------------<  100<H>  3.8   |  25  |  1.14    Ca    8.0<L>      2022 06:52  Phos  3.4     04-18  Mg     2.2     04-18    TPro  6.6  /  Alb  2.5<L>  /  TBili  0.5  /  DBili  x   /  AST  129<H>  /  ALT  214<H>  /  AlkPhos  112  04-18    PT/INR - ( 2022 06:52 )   PT: 14.9 sec;   INR: 1.25          PTT - ( 2022 06:52 )  PTT:30.9 sec  Urinalysis Basic - ( 2022 11:18 )    Color: Yellow / Appearance: SL Cloudy / S.020 / pH: x  Gluc: x / Ketone: Trace mg/dL  / Bili: Negative / Urobili: 0.2 E.U./dL   Blood: x / Protein: NEGATIVE mg/dL / Nitrite: NEGATIVE   Leuk Esterase: Large / RBC: 5-10 /HPF / WBC Many /HPF   Sq Epi: x / Non Sq Epi: 0-5 /HPF / Bacteria: Present /HPF      CAPILLARY BLOOD GLUCOSE          RADIOLOGY & ADDITIONAL TESTS: Reviewed.

## 2022-04-19 NOTE — PROGRESS NOTE ADULT - PROBLEM SELECTOR PLAN 3
Presented with WBC of 16, elevated at [facility] as well. Could be 2/2 infection-history of UTI at [facility] treated with 5 day course of CTX, denying any complaints. No cough, no persistent GI symptoms, no diarrhea, no vomiting. Could be 2/2 reactive process given neurological symptomology. Could also be 2/2 prednisone.   - UA positive, nation exchanged   - CXR at [facility] clear without infiltrates  - Patient currently constipated-unlikely to be GI source.  - started on ceftriaxone 2g q24 hrs

## 2022-04-19 NOTE — PROGRESS NOTE ADULT - ASSESSMENT
71M w/ PMHx BPH, recent h/o viral diarrhea x 1 week ago, c/o progressive weakness. Found urinary retention and transverse myelitis on MRI. Worsened despite IV steroids, transferred from [facility] for possible plasma exchange. Medicine consulted for fever.     NEURO:  #Bilateral Lower Extremity Weakness  - most likely Transverse myelitis , Received 3 days of steroids prior to transfer to [facility] from [facility] , started on IVIG per primary team on 4/16  - Further investigation and treatment per primary team     ID:  #[email or web address]  On 4/17 intermittently tachycardic,  o2 sats on room air decreased to 93% on room air from 100% , Fever 100.5. CT chest at [facility] which revealed plate like atelectasis in the [place]. UA +, s/p exchange of nation catheter ( placed at [facility] due to urinary retention ). CTPE negative.   - c/w Ceftriaxone total 5d course, f/u Urine Cx (currently showing E.coli >100k)    GI:  #Constipation  No BM in 4-5 days. No abd pain or tenderness, no N/V.   - Recommend enema       #Elevated Transaminases  Ultrasound RUQ, without gall bladder disease , + for mild hepatic steatosis   - LFTs downtrending  - F/u GI recs     #Small Pancreatic cystic mass   Negative  - GI consulted, planning for EUS and Biopsy      DVT prophylaxis: Lovenox     D/w attending

## 2022-04-19 NOTE — PROGRESS NOTE ADULT - PROBLEM SELECTOR PLAN 4
Elevation in AST ALT, could be 2/2 pancreatic mass versus autoimmune etiology. No significant drinking or drug history. No tenderness on exam, no organomegaly.   -follow malignancy workup as above.  -f/u numerous titers and PCR's ordered per GI

## 2022-04-19 NOTE — PROGRESS NOTE ADULT - ASSESSMENT
71 M with no past medical history, not on any medications, transferred here from [facility] for bilateral lower extremity weakness, with concern for Transverse Myelitis

## 2022-04-19 NOTE — PROGRESS NOTE ADULT - PROBLEM SELECTOR PLAN 2
CT AP 2.3 x 2.4 x 3.1 at pancreatic head, as per records, workup initiated at [facility]; however, unsure of results.  - Ca-19-9 and CEA negative at [facility]   - MR abdomen/pelvis w cont - pancreatic mass indeterminate etiology    - GI consult - tentative plan for EGD/EUS

## 2022-04-19 NOTE — CHART NOTE - NSCHARTNOTEFT_GEN_A_CORE
Discussed with patient his current treatment plan and goals. Patient has received IV steroid and IVIG for Transverse Myelitis with no improvement. Will start patient on Plasmapheresis starting tomorrow every other day for total of 5 sessions. CINP will place the dialysis catheter shortly. Discussed with patient during morning round , who agreed to the plan. Discussed with Neurology attending and Hematology attending. Discussed with patient his current treatment plan and goals. He is currently being treated for Lower extremity paralysis due to transverse myelitis, as evident in imaging.  Patient has received IV steroid and IVIG for Transverse Myelitis with no improvement. At this point patient will need plasmapheresis as the next step in treatment.  Will start patient on Plasmapheresis starting tomorrow every other day for total of 5 sessions. CINP will place the dialysis catheter shortly. Discussed with patient during morning round , who agreed to the plan. Discussed with Neurology attending and Hematology attending.

## 2022-04-19 NOTE — PROGRESS NOTE ADULT - ATTENDING COMMENTS
I was physically present for the key portions of the evaluation and managemnent (E/M) service provided.  I agree with the above history, physical, and plan which I have reviewed and edited where appropriate, with the exceptions as per my note.    pt seen and examined.    aox3  normal CN and UEs  LEs plegic with minimal sensation.  BL babinskis.    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 no abnormal yoav.    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 no response to IVMP or IVIG.    - start PLEX, 5 sessions QOD  - restart IVMP for at least 3d. if no improvement at 3d, continue for 5 days altogether  - await AQP4 and MOG abs  - dvt ppx, ppi, iss

## 2022-04-19 NOTE — PROGRESS NOTE ADULT - PROBLEM SELECTOR PLAN 1
Given history of diarrhea two weeks prior to LE weakness, ddx includes GBS versus transverse myelitis-imaging consistent with transverse myelitis. Lumbar tap done at [facility]-for which studies pending. There, ESR and CRP elevated. Patient finished three days of high dose steroids with minimal improvement in symptoms.   - c/w IVIG 0.65g/kg x3 days, possible plasma exchange starting 4/20 (will consult heme/onc today)  - MR head and CSpine w/wout - faint T2 hyperintensity in posterior lower cervical cord without definite enhancement   - NMO and anti-MOG antibodies   - CINP to place dialysis cath 4/19 for first plasma exchange session 4/20  - planning 5 sessions plasma exchange

## 2022-04-20 LAB
-  AMPICILLIN/SULBACTAM: SIGNIFICANT CHANGE UP
-  AMPICILLIN: SIGNIFICANT CHANGE UP
-  CEFAZOLIN: SIGNIFICANT CHANGE UP
-  CEFTRIAXONE: SIGNIFICANT CHANGE UP
-  CIPROFLOXACIN: SIGNIFICANT CHANGE UP
-  ERTAPENEM: SIGNIFICANT CHANGE UP
-  GENTAMICIN: SIGNIFICANT CHANGE UP
-  MEROPENEM: SIGNIFICANT CHANGE UP
-  NITROFURANTOIN: SIGNIFICANT CHANGE UP
-  PIPERACILLIN/TAZOBACTAM: SIGNIFICANT CHANGE UP
-  TOBRAMYCIN: SIGNIFICANT CHANGE UP
-  TRIMETHOPRIM/SULFAMETHOXAZOLE: SIGNIFICANT CHANGE UP
ALBUMIN SERPL ELPH-MCNC: 2.6 G/DL — LOW (ref 3.3–5)
ALP SERPL-CCNC: 119 U/L — SIGNIFICANT CHANGE UP (ref 40–120)
ALT FLD-CCNC: 99 U/L — HIGH (ref 10–45)
ANION GAP SERPL CALC-SCNC: 10 MMOL/L — SIGNIFICANT CHANGE UP (ref 5–17)
APTT BLD: 28.4 SEC — SIGNIFICANT CHANGE UP (ref 27.5–35.5)
AST SERPL-CCNC: 38 U/L — SIGNIFICANT CHANGE UP (ref 10–40)
BASOPHILS # BLD AUTO: 0 K/UL — SIGNIFICANT CHANGE UP (ref 0–0.2)
BASOPHILS NFR BLD AUTO: 0 % — SIGNIFICANT CHANGE UP (ref 0–2)
BILIRUB SERPL-MCNC: 0.3 MG/DL — SIGNIFICANT CHANGE UP (ref 0.2–1.2)
BLD GP AB SCN SERPL QL: NEGATIVE — SIGNIFICANT CHANGE UP
BUN SERPL-MCNC: 17 MG/DL — SIGNIFICANT CHANGE UP (ref 7–23)
BURR CELLS BLD QL SMEAR: PRESENT — SIGNIFICANT CHANGE UP
CALCIUM SERPL-MCNC: 8.3 MG/DL — LOW (ref 8.4–10.5)
CHLORIDE SERPL-SCNC: 100 MMOL/L — SIGNIFICANT CHANGE UP (ref 96–108)
CO2 SERPL-SCNC: 22 MMOL/L — SIGNIFICANT CHANGE UP (ref 22–31)
CREAT SERPL-MCNC: 0.94 MG/DL — SIGNIFICANT CHANGE UP (ref 0.5–1.3)
CULTURE RESULTS: SIGNIFICANT CHANGE UP
DACRYOCYTES BLD QL SMEAR: SLIGHT — SIGNIFICANT CHANGE UP
EBV EA AB SER IA-ACNC: 42.7 U/ML — HIGH
EBV EA AB TITR SER IF: POSITIVE
EBV EA IGG SER-ACNC: POSITIVE
EBV NA IGG SER IA-ACNC: >600 U/ML — HIGH
EBV PATRN SPEC IB-IMP: SIGNIFICANT CHANGE UP
EBV VCA IGG AVIDITY SER QL IA: POSITIVE
EBV VCA IGM SER IA-ACNC: <10 U/ML — SIGNIFICANT CHANGE UP
EBV VCA IGM SER IA-ACNC: >750 U/ML — HIGH
EBV VCA IGM TITR FLD: NEGATIVE — SIGNIFICANT CHANGE UP
EGFR: 87 ML/MIN/1.73M2 — SIGNIFICANT CHANGE UP
EOSINOPHIL # BLD AUTO: 0 K/UL — SIGNIFICANT CHANGE UP (ref 0–0.5)
EOSINOPHIL NFR BLD AUTO: 0 % — SIGNIFICANT CHANGE UP (ref 0–6)
FIBRINOGEN PPP-MCNC: 706 MG/DL — HIGH (ref 258–438)
GIANT PLATELETS BLD QL SMEAR: PRESENT — SIGNIFICANT CHANGE UP
GLUCOSE SERPL-MCNC: 180 MG/DL — HIGH (ref 70–99)
HBV DNA # SERPL NAA+PROBE: SIGNIFICANT CHANGE UP
HBV DNA SERPL NAA+PROBE-LOG#: SIGNIFICANT CHANGE UP LOGIU/ML
HCT VFR BLD CALC: 37.9 % — LOW (ref 39–50)
HGB BLD-MCNC: 12.6 G/DL — LOW (ref 13–17)
INR BLD: 1.1 — SIGNIFICANT CHANGE UP (ref 0.88–1.16)
LYMPHOCYTES # BLD AUTO: 0.32 K/UL — LOW (ref 1–3.3)
LYMPHOCYTES # BLD AUTO: 8 % — LOW (ref 13–44)
MAGNESIUM SERPL-MCNC: 2.3 MG/DL — SIGNIFICANT CHANGE UP (ref 1.6–2.6)
MANUAL SMEAR VERIFICATION: SIGNIFICANT CHANGE UP
MCHC RBC-ENTMCNC: 28.1 PG — SIGNIFICANT CHANGE UP (ref 27–34)
MCHC RBC-ENTMCNC: 33.2 GM/DL — SIGNIFICANT CHANGE UP (ref 32–36)
MCV RBC AUTO: 84.6 FL — SIGNIFICANT CHANGE UP (ref 80–100)
METAMYELOCYTES # FLD: 1.8 % — HIGH (ref 0–0)
METHOD TYPE: SIGNIFICANT CHANGE UP
MONOCYTES # BLD AUTO: 0 K/UL — SIGNIFICANT CHANGE UP (ref 0–0.9)
MONOCYTES NFR BLD AUTO: 0 % — LOW (ref 2–14)
NEUTROPHILS # BLD AUTO: 3.63 K/UL — SIGNIFICANT CHANGE UP (ref 1.8–7.4)
NEUTROPHILS NFR BLD AUTO: 89.3 % — HIGH (ref 43–77)
ORGANISM # SPEC MICROSCOPIC CNT: SIGNIFICANT CHANGE UP
ORGANISM # SPEC MICROSCOPIC CNT: SIGNIFICANT CHANGE UP
OVALOCYTES BLD QL SMEAR: SLIGHT — SIGNIFICANT CHANGE UP
PHOSPHATE SERPL-MCNC: 3.8 MG/DL — SIGNIFICANT CHANGE UP (ref 2.5–4.5)
PLAT MORPH BLD: NORMAL — SIGNIFICANT CHANGE UP
PLATELET # BLD AUTO: 208 K/UL — SIGNIFICANT CHANGE UP (ref 150–400)
POIKILOCYTOSIS BLD QL AUTO: SLIGHT — SIGNIFICANT CHANGE UP
POTASSIUM SERPL-MCNC: 4.6 MMOL/L — SIGNIFICANT CHANGE UP (ref 3.5–5.3)
POTASSIUM SERPL-SCNC: 4.6 MMOL/L — SIGNIFICANT CHANGE UP (ref 3.5–5.3)
PROT SERPL-MCNC: 7.6 G/DL — SIGNIFICANT CHANGE UP (ref 6–8.3)
PROTHROM AB SERPL-ACNC: 13.1 SEC — SIGNIFICANT CHANGE UP (ref 10.5–13.4)
RBC # BLD: 4.48 M/UL — SIGNIFICANT CHANGE UP (ref 4.2–5.8)
RBC # FLD: 13.2 % — SIGNIFICANT CHANGE UP (ref 10.3–14.5)
RBC BLD AUTO: ABNORMAL
RH IG SCN BLD-IMP: NEGATIVE — SIGNIFICANT CHANGE UP
RPR SERPL-ACNC: SIGNIFICANT CHANGE UP
SODIUM SERPL-SCNC: 132 MMOL/L — LOW (ref 135–145)
SPECIMEN SOURCE: SIGNIFICANT CHANGE UP
T PALLIDUM AB TITR SER: POSITIVE
T PALLIDUM IGG SER QL IF: SIGNIFICANT CHANGE UP
VARIANT LYMPHS # BLD: 0.9 % — SIGNIFICANT CHANGE UP (ref 0–6)
VIT D25+D1,25 OH+D1,25 PNL SERPL-MCNC: 45.8 PG/ML — SIGNIFICANT CHANGE UP (ref 19.9–79.3)
WBC # BLD: 4.06 K/UL — SIGNIFICANT CHANGE UP (ref 3.8–10.5)
WBC # FLD AUTO: 4.06 K/UL — SIGNIFICANT CHANGE UP (ref 3.8–10.5)

## 2022-04-20 PROCEDURE — 99233 SBSQ HOSP IP/OBS HIGH 50: CPT | Mod: GC

## 2022-04-20 PROCEDURE — 99233 SBSQ HOSP IP/OBS HIGH 50: CPT

## 2022-04-20 RX ORDER — NITROFURANTOIN MACROCRYSTAL 50 MG
100 CAPSULE ORAL EVERY 12 HOURS
Refills: 0 | Status: DISCONTINUED | OUTPATIENT
Start: 2022-04-20 | End: 2022-04-20

## 2022-04-20 RX ORDER — ERTAPENEM SODIUM 1 G/1
1000 INJECTION, POWDER, LYOPHILIZED, FOR SOLUTION INTRAMUSCULAR; INTRAVENOUS EVERY 24 HOURS
Refills: 0 | Status: COMPLETED | OUTPATIENT
Start: 2022-04-21 | End: 2022-04-24

## 2022-04-20 RX ORDER — ERTAPENEM SODIUM 1 G/1
1000 INJECTION, POWDER, LYOPHILIZED, FOR SOLUTION INTRAMUSCULAR; INTRAVENOUS ONCE
Refills: 0 | Status: COMPLETED | OUTPATIENT
Start: 2022-04-20 | End: 2022-04-20

## 2022-04-20 RX ORDER — LANOLIN ALCOHOL/MO/W.PET/CERES
5 CREAM (GRAM) TOPICAL ONCE
Refills: 0 | Status: COMPLETED | OUTPATIENT
Start: 2022-04-20 | End: 2022-04-20

## 2022-04-20 RX ORDER — ERTAPENEM SODIUM 1 G/1
INJECTION, POWDER, LYOPHILIZED, FOR SOLUTION INTRAMUSCULAR; INTRAVENOUS
Refills: 0 | Status: COMPLETED | OUTPATIENT
Start: 2022-04-20 | End: 2022-04-24

## 2022-04-20 RX ADMIN — CHLORHEXIDINE GLUCONATE 1 APPLICATION(S): 213 SOLUTION TOPICAL at 06:21

## 2022-04-20 RX ADMIN — ENOXAPARIN SODIUM 40 MILLIGRAM(S): 100 INJECTION SUBCUTANEOUS at 22:32

## 2022-04-20 RX ADMIN — PREGABALIN 1000 MICROGRAM(S): 225 CAPSULE ORAL at 12:23

## 2022-04-20 RX ADMIN — PANTOPRAZOLE SODIUM 40 MILLIGRAM(S): 20 TABLET, DELAYED RELEASE ORAL at 06:21

## 2022-04-20 RX ADMIN — PANTOPRAZOLE SODIUM 40 MILLIGRAM(S): 20 TABLET, DELAYED RELEASE ORAL at 17:38

## 2022-04-20 RX ADMIN — ERTAPENEM SODIUM 1000 MILLIGRAM(S): 1 INJECTION, POWDER, LYOPHILIZED, FOR SOLUTION INTRAMUSCULAR; INTRAVENOUS at 13:23

## 2022-04-20 RX ADMIN — POLYETHYLENE GLYCOL 3350 17 GRAM(S): 17 POWDER, FOR SOLUTION ORAL at 12:23

## 2022-04-20 RX ADMIN — TAMSULOSIN HYDROCHLORIDE 0.4 MILLIGRAM(S): 0.4 CAPSULE ORAL at 22:32

## 2022-04-20 RX ADMIN — SENNA PLUS 1 TABLET(S): 8.6 TABLET ORAL at 22:32

## 2022-04-20 RX ADMIN — Medication 58 MILLIGRAM(S): at 17:38

## 2022-04-20 NOTE — PROGRESS NOTE ADULT - ASSESSMENT
71 M with no past medical history, not on any medications, transferred here from [facility] for bilateral lower extremity weakness, with concern for Transverse Myelitis

## 2022-04-20 NOTE — PROGRESS NOTE ADULT - SUBJECTIVE AND OBJECTIVE BOX
MARCIAL ROMANO  71y  Male    Patient is a 71y old  Male who presents with a chief complaint of transverse myelitis (19 Apr 2022 12:34)      INTERVAL HPI/OVERNIGHT EVENTS: ANDREA o/n. Pt had 2 BMs since the enemas and overall feels less abdominal bloating.       T(C): 36.5 (04-20-22 @ 05:51), Max: 36.9 (04-19-22 @ 20:20)  HR: 66 (04-20-22 @ 05:51) (66 - 81)  BP: 137/84 (04-20-22 @ 05:51) (137/84 - 153/79)  RR: 18 (04-20-22 @ 05:51) (17 - 18)  SpO2: 95% (04-20-22 @ 05:51) (92% - 95%)  Wt(kg): --Vital Signs Last 24 Hrs  T(C): 36.5 (20 Apr 2022 05:51), Max: 36.9 (19 Apr 2022 20:20)  T(F): 97.7 (20 Apr 2022 05:51), Max: 98.4 (19 Apr 2022 20:20)  HR: 66 (20 Apr 2022 05:51) (66 - 81)  BP: 137/84 (20 Apr 2022 05:51) (137/84 - 153/79)  BP(mean): --  RR: 18 (20 Apr 2022 05:51) (17 - 18)  SpO2: 95% (20 Apr 2022 05:51) (92% - 95%)    PHYSICAL EXAM:  GENERAL: NAD  HEAD:  Atraumatic, Normocephalic  EYES: EOMI, PERRLA, conjunctiva and sclera clear  ENMT: Moist mucous membranes  NERVOUS SYSTEM:  Alert & Oriented X3, 1/5 strength in the LE B/L. Improved sensation to touch in B/L LE compared to previous day. No deficits in the UEs   CHEST/LUNG: Clear to auscultation bilaterally; No rales, rhonchi, wheezing, or rubs  HEART: Regular rate and rhythm; No murmurs, rubs, or gallops  ABDOMEN: Soft, Nontender, Nondistended; Bowel sounds present  EXTREMITIES:  WWP, No clubbing, cyanosis, or edema  Vascular: 2+ peripheral pulses x4      Consultant(s) Notes Reviewed:  [x ] YES  [ ] NO  Care Discussed with Consultants/Other Providers [ x] YES  [ ] NO    LABS:                        12.6   4.06  )-----------( 208      ( 20 Apr 2022 05:35 )             37.9     04-20    132<L>  |  100  |  17  ----------------------------<  180<H>  4.6   |  22  |  0.94    Ca    8.3<L>      20 Apr 2022 05:35  Phos  3.8     04-20  Mg     2.3     04-20    TPro  7.6  /  Alb  2.6<L>  /  TBili  0.3  /  DBili  x   /  AST  38  /  ALT  99<H>  /  AlkPhos  119  04-20    Iron 46, TIBC 137, %Sat 34, Ferritin 453/ 04-19-22 @ 07:22    PT/INR - ( 20 Apr 2022 05:35 )   PT: 13.1 sec;   INR: 1.10          PTT - ( 20 Apr 2022 05:35 )  PTT:28.4 sec    CAPILLARY BLOOD GLUCOSE                RADIOLOGY & ADDITIONAL TESTS:    Imaging Personally Reviewed:  [ ] YES  [ ] NO    HEALTH ISSUES - PROBLEM Dx:  Lower extremity weakness    Pancreatic mass    Urinary retention    Preventive measure    Leukocytosis    Transaminitis

## 2022-04-20 NOTE — PROGRESS NOTE ADULT - PROBLEM SELECTOR PLAN 5
Patient initially to [facility] with urosepsis c/b urinary retention, likely 2/2 neurological process. Marie placed at [facility].   -continue with flomax 0.4 QHS   -neuro workup as above.    #Complicated UTI   - UCx growing >100K E. Coli, sensitivities to follow   - C/w ceftriaxone 2g q24hrs Patient initially to [facility] with urosepsis c/b urinary retention, likely 2/2 neurological process. Nation placed at [facility].   -continue with flomax 0.4 QHS   -neuro workup as above  - d/c'd nation 4/20, f/u ToV    #Complicated UTI   s/p CTX  - UClx growing >100K ESBL E. Coli, started Ertapenem 1g q24h x5days

## 2022-04-20 NOTE — PROGRESS NOTE ADULT - SUBJECTIVE AND OBJECTIVE BOX
**INCOMPLETE NOTE    OVERNIGHT EVENTS:    SUBJECTIVE:  Patient seen and examined at bedside.    Vital Signs Last 12 Hrs  T(F): 97.7 (04-20-22 @ 05:51), Max: 98.4 (04-19-22 @ 20:20)  HR: 66 (04-20-22 @ 05:51) (66 - 81)  BP: 137/84 (04-20-22 @ 05:51) (137/84 - 153/79)  BP(mean): --  RR: 18 (04-20-22 @ 05:51) (17 - 18)  SpO2: 95% (04-20-22 @ 05:51) (92% - 95%)  I&O's Summary    18 Apr 2022 07:01  -  19 Apr 2022 07:00  --------------------------------------------------------  IN: 0 mL / OUT: 1450 mL / NET: -1450 mL    19 Apr 2022 07:01  -  20 Apr 2022 06:51  --------------------------------------------------------  IN: 0 mL / OUT: 2150 mL / NET: -2150 mL        PHYSICAL EXAM:  Constitutional: NAD, comfortable in bed.  HEENT: NC/AT, PERRLA, EOMI, no conjunctival pallor or scleral icterus, MMM  Neck: Supple, no JVD  Respiratory: CTA B/L. No w/r/r.   Cardiovascular: RRR, normal S1 and S2, no m/r/g.   Gastrointestinal: +BS, soft NTND, no guarding or rebound tenderness, no palpable masses   Extremities: wwp; no cyanosis, clubbing or edema.   Vascular: Pulses equal and strong throughout.   Neurological: AAOx3, no CN deficits, strength and sensation intact throughout.   Skin: No gross skin abnormalities or rashes        LABS:                        12.6   4.06  )-----------( 208      ( 20 Apr 2022 05:35 )             37.9     04-20    132<L>  |  100  |  17  ----------------------------<  180<H>  4.6   |  22  |  0.94    Ca    8.3<L>      20 Apr 2022 05:35  Phos  3.8     04-20  Mg     2.3     04-20    TPro  7.6  /  Alb  2.6<L>  /  TBili  0.3  /  DBili  x   /  AST  38  /  ALT  99<H>  /  AlkPhos  119  04-20    PT/INR - ( 20 Apr 2022 05:35 )   PT: 13.1 sec;   INR: 1.10          PTT - ( 20 Apr 2022 05:35 )  PTT:28.4 sec        RADIOLOGY & ADDITIONAL TESTS:    MEDICATIONS  (STANDING):  albumin human  5% IVPB 3250 milliLiter(s) IV Intermittent once  calcium gluconate IVPB 2 Gram(s) IV Intermittent once  cefTRIAXone   IVPB 2000 milliGRAM(s) IV Intermittent every 24 hours  chlorhexidine 2% Cloths 1 Application(s) Topical <User Schedule>  cyanocobalamin 1000 MICROGram(s) Oral daily  enoxaparin Injectable 40 milliGRAM(s) SubCutaneous every 24 hours  heparin  Lock Flush 100 Units/mL Injectable 600 Unit(s) IV Push once  lactulose Syrup 10 Gram(s) Oral every 24 hours  methylPREDNISolone sodium succinate IVPB 1000 milliGRAM(s) IV Intermittent every 24 hours  pantoprazole    Tablet 40 milliGRAM(s) Oral two times a day  polyethylene glycol 3350 17 Gram(s) Oral daily  senna 1 Tablet(s) Oral at bedtime  tamsulosin 0.4 milliGRAM(s) Oral at bedtime    MEDICATIONS  (PRN):  acetaminophen     Tablet .. 650 milliGRAM(s) Oral every 6 hours PRN Temp greater or equal to 38C (100.4F), Mild Pain (1 - 3)  sodium chloride 0.9% lock flush 10 milliLiter(s) IV Push every 1 hour PRN Pre/post blood products, medications, blood draw, and to maintain line patency   OVERNIGHT EVENTS: ANDREA    SUBJECTIVE:  Patient seen and examined at bedside.  No complaints, ROS negative    Vital Signs Last 12 Hrs  T(F): 97.7 (04-20-22 @ 05:51), Max: 98.4 (04-19-22 @ 20:20)  HR: 66 (04-20-22 @ 05:51) (66 - 81)  BP: 137/84 (04-20-22 @ 05:51) (137/84 - 153/79)  BP(mean): --  RR: 18 (04-20-22 @ 05:51) (17 - 18)  SpO2: 95% (04-20-22 @ 05:51) (92% - 95%)  I&O's Summary    18 Apr 2022 07:01  -  19 Apr 2022 07:00  --------------------------------------------------------  IN: 0 mL / OUT: 1450 mL / NET: -1450 mL    19 Apr 2022 07:01  -  20 Apr 2022 06:51  --------------------------------------------------------  IN: 0 mL / OUT: 2150 mL / NET: -2150 mL    PHYSICAL EXAM:  Constitutional: NAD, comfortable in bed.  HEENT: NC/AT, PERRLA, EOMI, no conjunctival pallor or scleral icterus, MMM  Neck: Supple, no JVD  Respiratory: CTA B/L. No w/r/r.   Cardiovascular: RRR, normal S1 and S2, no m/r/g.   Gastrointestinal: +BS, soft NTND, no guarding or rebound tenderness, no palpable masses   Extremities: wwp; no cyanosis, clubbing or edema.   Vascular: Pulses equal and strong throughout.   Neurological: AAOx3, no CN deficits, sensation intact throughout, b/l LE strength 1/5   Skin: No gross skin abnormalities or rashes    LABS:                        12.6   4.06  )-----------( 208      ( 20 Apr 2022 05:35 )             37.9     04-20    132<L>  |  100  |  17  ----------------------------<  180<H>  4.6   |  22  |  0.94    Ca    8.3<L>      20 Apr 2022 05:35  Phos  3.8     04-20  Mg     2.3     04-20    TPro  7.6  /  Alb  2.6<L>  /  TBili  0.3  /  DBili  x   /  AST  38  /  ALT  99<H>  /  AlkPhos  119  04-20    PT/INR - ( 20 Apr 2022 05:35 )   PT: 13.1 sec;   INR: 1.10       PTT - ( 20 Apr 2022 05:35 )  PTT:28.4 sec    RADIOLOGY & ADDITIONAL TESTS:    MEDICATIONS  (STANDING):  albumin human  5% IVPB 3250 milliLiter(s) IV Intermittent once  calcium gluconate IVPB 2 Gram(s) IV Intermittent once  cefTRIAXone   IVPB 2000 milliGRAM(s) IV Intermittent every 24 hours  chlorhexidine 2% Cloths 1 Application(s) Topical <User Schedule>  cyanocobalamin 1000 MICROGram(s) Oral daily  enoxaparin Injectable 40 milliGRAM(s) SubCutaneous every 24 hours  heparin  Lock Flush 100 Units/mL Injectable 600 Unit(s) IV Push once  lactulose Syrup 10 Gram(s) Oral every 24 hours  methylPREDNISolone sodium succinate IVPB 1000 milliGRAM(s) IV Intermittent every 24 hours  pantoprazole    Tablet 40 milliGRAM(s) Oral two times a day  polyethylene glycol 3350 17 Gram(s) Oral daily  senna 1 Tablet(s) Oral at bedtime  tamsulosin 0.4 milliGRAM(s) Oral at bedtime    MEDICATIONS  (PRN):  acetaminophen     Tablet .. 650 milliGRAM(s) Oral every 6 hours PRN Temp greater or equal to 38C (100.4F), Mild Pain (1 - 3)  sodium chloride 0.9% lock flush 10 milliLiter(s) IV Push every 1 hour PRN Pre/post blood products, medications, blood draw, and to maintain line patency

## 2022-04-20 NOTE — SWALLOW BEDSIDE ASSESSMENT ADULT - SLP GENERAL OBSERVATIONS
Received awake in bed, A&Ox4, family member at bedside. Pt denied dysphagia but did endorse an acute change in vocal quality. He described his voice as raspy. Voice is mildly hoarse. MPT:10 sec.

## 2022-04-20 NOTE — PROGRESS NOTE ADULT - ATTENDING COMMENTS
I was physically present for the key portions of the evaluation and managemnent (E/M) service provided.  I agree with the above history, physical, and plan which I have reviewed and edited where appropriate, with the exceptions as per my note.    Pt seen and examined.    Has regained 1-2 strength in LEs.    starting plex today. continues IVMP    AP: myelitis    - plex x5 sessions  - IVMP x5 days (in addition to 3 prior days, 8 overall). then prednisone taper starting at 60mg daily  - dvt ppx  - iss, gi ppx.

## 2022-04-20 NOTE — PROGRESS NOTE ADULT - SUBJECTIVE AND OBJECTIVE BOX
LENGTH OF HOSPITAL STAY: 5d    CHIEF COMPLAINT: Weakness    Interval HPI: Patient seen and examined. No new complaints. Starting PLEX today.       VITALS:   Vital Signs Last 24 Hrs  T(C): 36.5 (20 Apr 2022 05:51), Max: 36.9 (19 Apr 2022 20:20)  T(F): 97.7 (20 Apr 2022 05:51), Max: 98.4 (19 Apr 2022 20:20)  HR: 66 (20 Apr 2022 05:51) (66 - 81)  BP: 137/84 (20 Apr 2022 05:51) (137/84 - 153/79)  RR: 18 (20 Apr 2022 05:51) (17 - 18)  SpO2: 95% (20 Apr 2022 05:51) (92% - 95%)    GEN: No acute distress  HEENT: NCAT  LUNGS: Clear to auscultation bilaterally   HEART: S1/S2 present. RRR.   ABD: Soft, non-tender, non-distended. Bowel sounds present  EXT: No pitting edema, 0/5 lower extremities  NEURO: AAOX3      MEDICATIONS  (STANDING):  albumin human  5% IVPB 3250 milliLiter(s) IV Intermittent once  calcium gluconate IVPB 2 Gram(s) IV Intermittent once  chlorhexidine 2% Cloths 1 Application(s) Topical <User Schedule>  cyanocobalamin 1000 MICROGram(s) Oral daily  enoxaparin Injectable 40 milliGRAM(s) SubCutaneous every 24 hours  ertapenem  IVPB      ertapenem  IVPB 1000 milliGRAM(s) IV Intermittent once  heparin  Lock Flush 100 Units/mL Injectable 600 Unit(s) IV Push once  lactulose Syrup 10 Gram(s) Oral every 24 hours  methylPREDNISolone sodium succinate IVPB 1000 milliGRAM(s) IV Intermittent every 24 hours  pantoprazole    Tablet 40 milliGRAM(s) Oral two times a day  polyethylene glycol 3350 17 Gram(s) Oral daily  senna 1 Tablet(s) Oral at bedtime  tamsulosin 0.4 milliGRAM(s) Oral at bedtime    MEDICATIONS  (PRN):  acetaminophen     Tablet .. 650 milliGRAM(s) Oral every 6 hours PRN Temp greater or equal to 38C (100.4F), Mild Pain (1 - 3)  sodium chloride 0.9% lock flush 10 milliLiter(s) IV Push every 1 hour PRN Pre/post blood products, medications, blood draw, and to maintain line patency    LABS:                                   12.6   4.06  )-----------( 208      ( 20 Apr 2022 05:35 )             37.9       04-20    132<L>  |  100  |  17  ----------------------------<  180<H>  4.6   |  22  |  0.94    Ca    8.3<L>      20 Apr 2022 05:35  Phos  3.8     04-20  Mg     2.3     04-20    TPro  7.6  /  Alb  2.6<L>  /  TBili  0.3  /  DBili  x   /  AST  38  /  ALT  99<H>  /  AlkPhos  119  04-20      PT/INR - ( 20 Apr 2022 05:35 )   PT: 13.1 sec;   INR: 1.10       PTT - ( 20 Apr 2022 05:35 )  PTT:28.4 sec    Fibrinogen Clauss (04.20.22 @ 05:35)   Fibrinogen Clauss: 706    RADIOLOGY:  < from: MR Cervical Spine w/wo IV Cont (04.16.22 @ 18:30) >  No evidence of abnormal signal changes within the spinal cord. No   evidence of abnormal enhancement.    At the C5/C6 level there is a disc osteophyte complex contacting the   ventral spinal cord, right greater than left. There is bilateral   uncovertebral and facet hypertrophy. There is moderate to severe   bilateral foraminal narrowing.    At the C6/C7 level there is a mild diffuse disc bulge flattening the   ventral thecal sac. This bilateral uncovertebral and facet hypertrophy.   There is mild right and severe left foraminal narrowing. There is no   evidence of spinal    < end of copied text >    < from: CT Angio Chest PE Protocol w/ IV Cont (04.17.22 @ 16:48) >  1. No pulmonary embolism.  2. Mild bilateral basal dependent atelectasis . No focal consolidation.    < end of copied text >    < from: MR Abdomen w/wo IV Cont (04.16.22 @ 18:30) >  Complex cysticpancreatic head lesion. Ductal dilatation. The   differential includes mixed IPMN and atypical serous cystadenoma.   Endoscopic ultrasound and tissue sampling are recommended.    < end of copied text >

## 2022-04-20 NOTE — SWALLOW BEDSIDE ASSESSMENT ADULT - NS SPL SWALLOW CLINIC TRIAL FT
Oral phase is unremarkable with adequate bolus containment, processing and clearance. Laryngeal movement palpated; swallow appeared brisk and timely. No clinical overt s/s of aspiration appreciated. Pt denied globus sensation or odynophagia.

## 2022-04-20 NOTE — PROGRESS NOTE ADULT - ASSESSMENT
70 yo M with no significant PMHx, transferred from [facility] for bilateral lower extremity weakness, with working diagnosis of thoracic transverse myelitis, s/p lumbar puncture at [facility], failed high-dose steroids and expected to have minimal-no response to IVIG per Neurology. Incidentally found ot have a cystic pancreatic head lesion (2.2 x 2.4 x 3.1 cm), suspected IPMN, with partial compression of portal vein and superior aspect of SMV with non-specific colitis of ascending transverse and upper half of descending colon. MRCP (04/16/22) showed a complex cystic pancreatic head lesion, either IPMN or atypical serous cystadenoma, with recommendation for EUS and biopsy sampling. Hematology consulted for initiation of PLEX on 04/20/22 per Neurology.    #) DIAGNOSIS  - Steroid- and IVIG-refractory thoracic transverse myelitis  - Complex cystic pancreatic head mass     #) PLAN  - Per Neurology, planning for 5 sessions of PLEX starting on 04/20/22 via HD catheter.   - Will need to trend CBC with differential, CMP once daily. Will need to trend PT/PTT and fibrinogen once every other day while on PLEX  - Will need to follow-up with GI for eventual biopsy of the pancreatic head lesion.   - Maintain active T+S, transfuse if Hb < 7 or if actively bleeding     Discussed with Dr. Anne

## 2022-04-20 NOTE — DIETITIAN INITIAL EVALUATION ADULT - ADD RECOMMEND
-Continue Regular diet  -Follow PO intake, weights, tolerance   -Pain and bowel regimen per team  -Trend labs, replete prn

## 2022-04-20 NOTE — DIETITIAN INITIAL EVALUATION ADULT - OTHER INFO
71 M with no past medical history, not on any medications, transferred here from [facility] for bilateral lower extremity weakness, with concern for Transverse Myelitis vs [facility], for plasma exchange. Admitted to [facility] for nausea, vomiting, diarrhea and weakness, accompanied by abd distension and difficulty urinating. Found to be in urosepsis, complicated by urinary obstruction then on day prior to planned discharge 4/12, he started to develop severe paresthesia, numbness, and weakness of bilateral lower extremities. He underwent abdominal imaging and MRI of spine at [facility], and was found to have pancreatic lesion at pancreatic head, and MRI thoracic spine consistent with transverse myelitis. Patient transferred to [facility] for possible plasma exchange.     Pt assessed at bedside on 7UR. Continues on regular diet. Reports fair PO intake, ~50-75% baseline intake, tolerating well with no N/V/D. Constipation noted, BMs wnl s/p 2 enemas. Pt reports prior to admission at OSH had no changes in intake, weight, tolerance. Reviewed adequate intake, hydration. Diet preferences reviewed. NKFA. No pain noted. Osman 15, skin intact. RD to follow, nutrition recommendations below.

## 2022-04-20 NOTE — CHART NOTE - NSCHARTNOTEFT_GEN_A_CORE
Infectious Diseases Anti-infective Approval Note    Medication: ertapenem  Dose: 1g  Route: IV  Frequency: q24h  Duration**: 5 days    *THIS IS NOT AN INFECTIOUS DISEASES CONSULTATION*    **Indicates duration of approval, not necessarily duration of treatment**

## 2022-04-20 NOTE — PROGRESS NOTE ADULT - ASSESSMENT
per Neurology    71 y o M with no past medical history, not on any medications, transferred here from [facility] for bilateral lower extremity weakness, with concern for Transverse Myelitis    Problem/Plan - 1:  ·  Problem: Lower extremity weakness.   ·  Plan: Given history of diarrhea two weeks prior to LE weakness, ddx includes GBS versus transverse myelitis-imaging consistent with transverse myelitis. Lumbar tap done at [facility]-for which studies pending. There, ESR and CRP elevated. Patient finished three days of high dose steroids with minimal improvement in symptoms.   - c/w IVIG 0.65g/kg x3 days, possible plasma exchange starting 4/20 (will consult heme/onc today)  - MR head and CSpine w/wout - faint T2 hyperintensity in posterior lower cervical cord without definite enhancement   - NMO and anti-MOG antibodies   - CINP to place dialysis cath 4/19 for first plasma exchange session 4/20  - planning 5 sessions plasma exchange.    Problem/Plan - 2:  ·  Problem: Pancreatic mass.   ·  Plan: CT AP 2.3 x 2.4 x 3.1 at pancreatic head, as per records, workup initiated at [facility]; however, unsure of results.  - Ca-19-9 and CEA negative at [facility]   - MR abdomen/pelvis w cont - pancreatic mass indeterminate etiology    - GI consult - tentative plan for EGD/EUS.    Problem/Plan - 3:  ·  Problem: Leukocytosis.   ·  Plan: Presented with WBC of 16, elevated at [facility] as well. Could be 2/2 infection-history of UTI at [facility] treated with 5 day course of CTX, denying any complaints. No cough, no persistent GI symptoms, no diarrhea, no vomiting. Could be 2/2 reactive process given neurological symptomology. Could also be 2/2 prednisone.   - UA positive, nation exchanged   - CXR at [facility] clear without infiltrates  - Patient currently constipated-unlikely to be GI source.  - started on ceftriaxone 2g q24 hrs.    Problem/Plan - 4:  ·  Problem: Transaminitis.   ·  Plan: Elevation in AST ALT, could be 2/2 pancreatic mass versus autoimmune etiology. No significant drinking or drug history. No tenderness on exam, no organomegaly.   -follow malignancy workup as above.  -f/u numerous titers and PCR's ordered per GI.    Problem/Plan - 5:  ·  Problem: Urinary retention.   ·  Plan: Patient initially to [facility] with urosepsis c/b urinary retention, likely 2/2 neurological process. Nation placed at [facility].   -continue with flomax 0.4 QHS   -neuro workup as above.    #Complicated UTI   - UCx growing >100K E. Coli, sensitivities to follow   - C/w ceftriaxone 2g q24hrs.    Problem/Plan - 6:  ·  Problem: Preventive measure.   ·  Plan: F-none    E-replete PRN    N-regular diet      DVT-lovenox 40SQ.    Problem/Plan - 7:  ·  Problem: R/O Pulmonary embolism.   ·  Plan: - pt was tachycardia, d-dimer elevated  - CT angio negative for PE.

## 2022-04-20 NOTE — SWALLOW BEDSIDE ASSESSMENT ADULT - SLP PERTINENT HISTORY OF CURRENT PROBLEM
PMHx BPH, recent h/o viral diarrhea x 1 week ago, c/o progressive weakness. Found urinary retention and transverse myelitis on MRI. No improvement with IV steroids. Transferred to [facility] for possible plasma exchange. Started on IVIG 4/16 and restarted on another course of high-dose steroids 4/19.

## 2022-04-20 NOTE — SWALLOW BEDSIDE ASSESSMENT ADULT - SWALLOW EVAL: DIAGNOSIS
Oropharyngeal swallow was clinically judged to be WFL. Pt denies any dysphagic symptoms. Mild hoarseness noted. Although rare, could potentially be adverse effect associated with IVIG. Pt was encouraged to monitor voice. Outpatient voice assessment should symptoms persist/worsen.

## 2022-04-20 NOTE — PROGRESS NOTE ADULT - PROBLEM SELECTOR PLAN 1
Given history of diarrhea two weeks prior to LE weakness, ddx includes GBS versus transverse myelitis-imaging consistent with transverse myelitis. Lumbar tap done at [facility]-for which studies pending. There, ESR and CRP elevated. Patient finished three days of high dose steroids with minimal improvement in symptoms.   - c/w IVIG 0.65g/kg x3 days, possible plasma exchange starting 4/20 (will consult heme/onc today)  - MR head and CSpine w/wout - faint T2 hyperintensity in posterior lower cervical cord without definite enhancement   - NMO and anti-MOG antibodies   - CINP to place dialysis cath 4/19 for first plasma exchange session 4/20  - planning 5 sessions plasma exchange Hx diarrhea two weeks prior to acute LE weakness, imaging consistent with transverse myelitis. Elevated inflammatory markers. Lumbar tap done at [facility]-for which studies pending. s/p x3d high dose steroids with minimal improvement in symptoms.   - s/p IVIG 0.65g/kg x3 days, HD cath placed 4/19 for PLEX starting 4/20 x5 sessions(heme/onc following)  - MR head and CSpine w/wout - faint T2 hyperintensity in posterior lower cervical cord without definite enhancement   - NMO and anti-MOG antibodies  - Autoimmune myelopathy panel sent 4/20 6pm

## 2022-04-20 NOTE — PROGRESS NOTE ADULT - SUBJECTIVE AND OBJECTIVE BOX
Physical Medicine and Rehabilitation Progress Note:    Patient is a 71y old  Male who presents with a chief complaint of TM (20 Apr 2022 11:23)      HPI:  71 M with no past medical history, not on any medications, transferred here from [facility] for bilateral lower extremity weakness, with concern for Transverse Myelitis vs Guillan Anthony, for plasma exchange. Patient's story dates back to week and a half ago, when he went to [place] for a vacation, developed nausea, vomiting, diarrhea and weakness, accompanied by abd distension and difficulty urinating for which he went to [facility]. There, he was found to be in urosepsis, complicated by urinary obstruction for which Marie Catheter was placed. On day prior to planned discharge 4/12, he started to develop severe paresthesia, numbness, and weakness of bilateral lower extremities. He underwent abdominal imaging and MRI of spine at [facility], and was found to have pancreatic lesion at pancreatic head, and MRI thoracic spine consistent with transverse myelitis. He then was given high dose steroids for three days, which provided minimal improvement of symptoms. As a result, patient transferred to [facility] for possible plasma exchange.   Of note-patient travelled to [place] and [place] in November, for which he received a series of immunizations that he does not recall.     Currently, he is endorsing weakness, tingling, and numbness of his bilateral lower extremities up until the point of his umbilicus. He is denying any other symptoms of his UE, notes no weakness, paresthesias, tingling. Currently only complaint is abdominal pain and constipation.      Relevant imaging/studies from [facility]:  CT AP: Ill defined low density lesion within pancreatic head represent a benign or malignant neoplasm. Measures 2.2 x 2.4 x 3.1 cm which may represent a benign or malignant neoplasm, pancreatic lesion partially compresses the portal vein and superior aspect of SMV. Bowel: Mild diffuse wall thickening of the ascending transverse and upper half of the descending colon which may relate to possible mild non specific colitis.   MRI Thoracic:  Borderline abn signal within the central cord substance throughout entire length of thoracic spine could reflect transverse myelitis.   ESR- 30 CRP- 14.2  CSF total protein 80, CSF PCR negative, others pending (15 Apr 2022 20:38)                            12.6   4.06  )-----------( 208      ( 20 Apr 2022 05:35 )             37.9       04-20    132<L>  |  100  |  17  ----------------------------<  180<H>  4.6   |  22  |  0.94    Ca    8.3<L>      20 Apr 2022 05:35  Phos  3.8     04-20  Mg     2.3     04-20    TPro  7.6  /  Alb  2.6<L>  /  TBili  0.3  /  DBili  x   /  AST  38  /  ALT  99<H>  /  AlkPhos  119  04-20    Vital Signs Last 24 Hrs  T(C): 36.5 (20 Apr 2022 05:51), Max: 36.9 (19 Apr 2022 20:20)  T(F): 97.7 (20 Apr 2022 05:51), Max: 98.4 (19 Apr 2022 20:20)  HR: 66 (20 Apr 2022 05:51) (66 - 81)  BP: 137/84 (20 Apr 2022 05:51) (137/84 - 153/79)  BP(mean): --  RR: 18 (20 Apr 2022 05:51) (17 - 18)  SpO2: 95% (20 Apr 2022 05:51) (92% - 95%)    MEDICATIONS  (STANDING):  albumin human  5% IVPB 3250 milliLiter(s) IV Intermittent once  calcium gluconate IVPB 2 Gram(s) IV Intermittent once  chlorhexidine 2% Cloths 1 Application(s) Topical <User Schedule>  cyanocobalamin 1000 MICROGram(s) Oral daily  enoxaparin Injectable 40 milliGRAM(s) SubCutaneous every 24 hours  ertapenem  IVPB      heparin  Lock Flush 100 Units/mL Injectable 600 Unit(s) IV Push once  lactulose Syrup 10 Gram(s) Oral every 24 hours  methylPREDNISolone sodium succinate IVPB 1000 milliGRAM(s) IV Intermittent every 24 hours  pantoprazole    Tablet 40 milliGRAM(s) Oral two times a day  polyethylene glycol 3350 17 Gram(s) Oral daily  senna 1 Tablet(s) Oral at bedtime  tamsulosin 0.4 milliGRAM(s) Oral at bedtime    MEDICATIONS  (PRN):  acetaminophen     Tablet .. 650 milliGRAM(s) Oral every 6 hours PRN Temp greater or equal to 38C (100.4F), Mild Pain (1 - 3)  sodium chloride 0.9% lock flush 10 milliLiter(s) IV Push every 1 hour PRN Pre/post blood products, medications, blood draw, and to maintain line patency    Currently Undergoing Physical/ Occupational Therapy at bedside.    PT/OT Functional Status Assessment:   4/19/2022    Cognitive/Neuro/Behavioral  Cognitive/Neuro/Behavioral [WDL Definition: Alert; opens eyes spontaneously; arouses to voice or touch; oriented x 4; follows commands; speech spontaneous, logical; purposeful motor response; behavior appropriate to situation]: WDL    Language Assistance  Preferred Language to Address Healthcare Preferred Language to Address Healthcare: English    Therapeutic Interventions      Bed Mobility  Bed Mobility Training Scooting: maximum assist (25% patient effort);  2 person assist;  nonverbal cues (demo/gestures);  verbal cues  Bed Mobility Training Sit-to-Supine: maximum assist (25% patient effort);  2 person assist;  nonverbal cues (demo/gestures);  verbal cues  Bed Mobility Training Supine-to-Sit: maximum assist (25% patient effort);  2 person assist;  nonverbal cues (demo/gestures);  verbal cues  Bed Mobility Training Limitations: decreased ability to use legs for bridging/pushing;  impaired ability to control trunk for mobility;  decreased strength;  impaired balance;  impaired postural control    Therapeutic Exercise  Therapeutic Exercise Detail: Sitting EOB: 10 x anterior weight shifts, 10 x L lateral weight shifts onto forearm (raised on pillows), 10 x R lateral weight shifts onto forearm (raised on pillow), 10 x BUE shoulder raises. Pt with poor sitting balance requiring mod/max A and L lateral lean at rest. Pt aware of postural deficits and able to attempt to self-correct to midline.           PM&R Impression: as above    Current Disposition Plan Recommendations:   acute rehab placement

## 2022-04-20 NOTE — CHART NOTE - NSCHARTNOTEFT_GEN_A_CORE
Follow-up of prior consult - 71yM w/no PMHx p/w bl LE weakness and numbness, with imaging suggestive of transverse myelitis. GI consulted for incidentally found pancreatic lesion on CT. Multicystic structure in the uncinate process of the pancreas measuring 3.5cm with associated PD dilation +/- PD communication suggestive of possible large serous cystadenoma or main duct IPMN. Patient should undergo tissue sampling, however, this is considered an elective procedure and should wait until acute neurologic issues have resolved. In regard to hepatocellular liver injury, LTs appear to be resolving indicative of a transient etiology, likely either related to cholestasis in the setting of infection or medication effect (Cephalosporins known to cause biliary crystallization). Work-up thus far has revealed an element of either chronic or cured HBV, awaiting HBV DNA PCR, HDV and HEV serologies. Follow-up remaining serologic work-up and continue to trend LTs and INR. Recommendations have been discussed with primary team and case discussed with attending physician.

## 2022-04-20 NOTE — DIETITIAN INITIAL EVALUATION ADULT - PERTINENT LABORATORY DATA
04-20    132<L>  |  100  |  17  ----------------------------<  180<H>  4.6   |  22  |  0.94    Ca    8.3<L>      20 Apr 2022 05:35  Phos  3.8     04-20  Mg     2.3     04-20    TPro  7.6  /  Alb  2.6<L>  /  TBili  0.3  /  DBili  x   /  AST  38  /  ALT  99<H>  /  AlkPhos  119  04-20  A1C with Estimated Average Glucose Result: 5.9 % (04-19-22 @ 07:22)

## 2022-04-20 NOTE — PROGRESS NOTE ADULT - PROBLEM SELECTOR PLAN 2
CT AP 2.3 x 2.4 x 3.1 at pancreatic head, as per records, workup initiated at [facility]; however, unsure of results.  - Ca-19-9 and CEA negative at [facility]   - MR abdomen/pelvis w cont - pancreatic mass indeterminate etiology    - GI consult - tentative plan for EGD/EUS

## 2022-04-20 NOTE — PROGRESS NOTE ADULT - PROBLEM SELECTOR PLAN 4
Elevation in AST ALT, could be 2/2 pancreatic mass versus autoimmune etiology. No significant drinking or drug history. No tenderness on exam, no organomegaly.   -follow malignancy workup as above.  -f/u numerous titers and PCR's ordered per GI Downtrending LFTs, likely 2/2 pancreatic mass versus autoimmune etiology. No significant drinking or drug history. No tenderness on exam, no organomegaly.   - follow malignancy workup as above.  - f/u numerous titers and PCR's ordered per GI

## 2022-04-20 NOTE — PROGRESS NOTE ADULT - ASSESSMENT
71M w/ PMHx BPH, recent h/o viral diarrhea x 1 week ago, c/o progressive weakness. Found urinary retention and transverse myelitis on MRI. Worsened despite IV steroids, transferred from [facility] for possible plasma exchange. Medicine consulted for fever.     NEURO:  #Bilateral Lower Extremity Weakness  Most likely Transverse myelitis , Received 3 days of steroids prior to transfer to [facility] from [facility] , started on IVIG per primary team on 4/16. Restarted another course of high-dose steroids on 4/19. Plan for PLEX today   - Further investigation and treatment per primary team     ID:  #[email or web address]  On 4/17 intermittently tachycardic,  o2 sats on room air decreased to 93% on room air from 100% , Fever 100.5. CT chest at [facility] which revealed plate like atelectasis in the Lingula. UA +, s/p exchange of nation catheter ( placed at [facility] due to urinary retention ). CTPE negative.   - Obtain ID approval for Erta course. Urine Cx (currently showing ESBL E.coli >100k)    GI:  #Constipation  2 BMs since yesterday's enema. No abd pain or tenderness, no N/V.   - Enema PRN       #Elevated Transaminases  Ultrasound RUQ, without gall bladder disease , + for mild hepatic steatosis   - LFTs downtrending  - F/u GI recs     #Small Pancreatic cystic mass   Negative  - GI consulted, planning for EUS and Biopsy      DVT prophylaxis: Lovenox     D/w attending

## 2022-04-20 NOTE — DIETITIAN INITIAL EVALUATION ADULT - PERTINENT MEDS FT
MEDICATIONS  (STANDING):  albumin human  5% IVPB 3250 milliLiter(s) IV Intermittent once  calcium gluconate IVPB 2 Gram(s) IV Intermittent once  chlorhexidine 2% Cloths 1 Application(s) Topical <User Schedule>  cyanocobalamin 1000 MICROGram(s) Oral daily  enoxaparin Injectable 40 milliGRAM(s) SubCutaneous every 24 hours  ertapenem  IVPB      heparin  Lock Flush 100 Units/mL Injectable 600 Unit(s) IV Push once  lactulose Syrup 10 Gram(s) Oral every 24 hours  methylPREDNISolone sodium succinate IVPB 1000 milliGRAM(s) IV Intermittent every 24 hours  pantoprazole    Tablet 40 milliGRAM(s) Oral two times a day  polyethylene glycol 3350 17 Gram(s) Oral daily  senna 1 Tablet(s) Oral at bedtime  tamsulosin 0.4 milliGRAM(s) Oral at bedtime    MEDICATIONS  (PRN):  acetaminophen     Tablet .. 650 milliGRAM(s) Oral every 6 hours PRN Temp greater or equal to 38C (100.4F), Mild Pain (1 - 3)  sodium chloride 0.9% lock flush 10 milliLiter(s) IV Push every 1 hour PRN Pre/post blood products, medications, blood draw, and to maintain line patency

## 2022-04-20 NOTE — PROGRESS NOTE ADULT - PROBLEM SELECTOR PLAN 3
Presented with WBC of 16, elevated at [facility] as well. Could be 2/2 infection-history of UTI at [facility] treated with 5 day course of CTX, denying any complaints. No cough, no persistent GI symptoms, no diarrhea, no vomiting. Could be 2/2 reactive process given neurological symptomology. Could also be 2/2 prednisone.   - UA positive, nation exchanged   - CXR at [facility] clear without infiltrates  - Patient currently constipated-unlikely to be GI source.  - started on ceftriaxone 2g q24 hrs RESOLVED. Presented with WBC of 16, elevated at [facility] as well. Could be 2/2 infection-history of UTI at [facility] treated with 5 day course of CTX, denying any complaints. No cough, no persistent GI symptoms, no diarrhea, no vomiting. Could be 2/2 reactive process given neurological symptomology. Could also be 2/2 prednisone.   - UA positive, nation exchanged   - CXR at [facility] clear without infiltrates  - Patient currently constipated-unlikely to be GI source.

## 2022-04-21 LAB
ACE SERPL-CCNC: 22 U/L — SIGNIFICANT CHANGE UP (ref 14–82)
ALBUMIN SERPL ELPH-MCNC: 3.8 G/DL — SIGNIFICANT CHANGE UP (ref 3.3–5)
ALP SERPL-CCNC: 49 U/L — SIGNIFICANT CHANGE UP (ref 40–120)
ALT FLD-CCNC: 40 U/L — SIGNIFICANT CHANGE UP (ref 10–45)
ANION GAP SERPL CALC-SCNC: 10 MMOL/L — SIGNIFICANT CHANGE UP (ref 5–17)
ANISOCYTOSIS BLD QL: SLIGHT — SIGNIFICANT CHANGE UP
APTT BLD: 29.3 SEC — SIGNIFICANT CHANGE UP (ref 27.5–35.5)
AST SERPL-CCNC: 24 U/L — SIGNIFICANT CHANGE UP (ref 10–40)
BASOPHILS # BLD AUTO: 0 K/UL — SIGNIFICANT CHANGE UP (ref 0–0.2)
BASOPHILS NFR BLD AUTO: 0 % — SIGNIFICANT CHANGE UP (ref 0–2)
BILIRUB SERPL-MCNC: 0.3 MG/DL — SIGNIFICANT CHANGE UP (ref 0.2–1.2)
BUN SERPL-MCNC: 21 MG/DL — SIGNIFICANT CHANGE UP (ref 7–23)
BURR CELLS BLD QL SMEAR: PRESENT — SIGNIFICANT CHANGE UP
CALCIUM SERPL-MCNC: 8.5 MG/DL — SIGNIFICANT CHANGE UP (ref 8.4–10.5)
CERULOPLASMIN SERPL-MCNC: 11 MG/DL — LOW (ref 15–30)
CHLORIDE SERPL-SCNC: 105 MMOL/L — SIGNIFICANT CHANGE UP (ref 96–108)
CO2 SERPL-SCNC: 22 MMOL/L — SIGNIFICANT CHANGE UP (ref 22–31)
CREAT SERPL-MCNC: 0.97 MG/DL — SIGNIFICANT CHANGE UP (ref 0.5–1.3)
DACRYOCYTES BLD QL SMEAR: SLIGHT — SIGNIFICANT CHANGE UP
EGFR: 84 ML/MIN/1.73M2 — SIGNIFICANT CHANGE UP
ELLIPTOCYTES BLD QL SMEAR: SLIGHT — SIGNIFICANT CHANGE UP
EOSINOPHIL # BLD AUTO: 0 K/UL — SIGNIFICANT CHANGE UP (ref 0–0.5)
EOSINOPHIL NFR BLD AUTO: 0 % — SIGNIFICANT CHANGE UP (ref 0–6)
FIBRINOGEN PPP-MCNC: 192 MG/DL — LOW (ref 258–438)
GIANT PLATELETS BLD QL SMEAR: PRESENT — SIGNIFICANT CHANGE UP
GLUCOSE SERPL-MCNC: 165 MG/DL — HIGH (ref 70–99)
HCT VFR BLD CALC: 38 % — LOW (ref 39–50)
HGB BLD-MCNC: 12.8 G/DL — LOW (ref 13–17)
INR BLD: 1.17 — HIGH (ref 0.88–1.16)
LYMPHOCYTES # BLD AUTO: 0.65 K/UL — LOW (ref 1–3.3)
LYMPHOCYTES # BLD AUTO: 5.3 % — LOW (ref 13–44)
MAGNESIUM SERPL-MCNC: 2.2 MG/DL — SIGNIFICANT CHANGE UP (ref 1.6–2.6)
MANUAL SMEAR VERIFICATION: SIGNIFICANT CHANGE UP
MCHC RBC-ENTMCNC: 28.6 PG — SIGNIFICANT CHANGE UP (ref 27–34)
MCHC RBC-ENTMCNC: 33.7 GM/DL — SIGNIFICANT CHANGE UP (ref 32–36)
MCV RBC AUTO: 85 FL — SIGNIFICANT CHANGE UP (ref 80–100)
METAMYELOCYTES # FLD: 0.9 % — HIGH (ref 0–0)
MICROCYTES BLD QL: SLIGHT — SIGNIFICANT CHANGE UP
MONOCYTES # BLD AUTO: 0.32 K/UL — SIGNIFICANT CHANGE UP (ref 0–0.9)
MONOCYTES NFR BLD AUTO: 2.6 % — SIGNIFICANT CHANGE UP (ref 2–14)
NEUTROPHILS # BLD AUTO: 11.16 K/UL — HIGH (ref 1.8–7.4)
NEUTROPHILS NFR BLD AUTO: 91.2 % — HIGH (ref 43–77)
OVALOCYTES BLD QL SMEAR: SIGNIFICANT CHANGE UP
PHOSPHATE SERPL-MCNC: 3.6 MG/DL — SIGNIFICANT CHANGE UP (ref 2.5–4.5)
PLAT MORPH BLD: NORMAL — SIGNIFICANT CHANGE UP
PLATELET # BLD AUTO: 195 K/UL — SIGNIFICANT CHANGE UP (ref 150–400)
POIKILOCYTOSIS BLD QL AUTO: SIGNIFICANT CHANGE UP
POTASSIUM SERPL-MCNC: 4.4 MMOL/L — SIGNIFICANT CHANGE UP (ref 3.5–5.3)
POTASSIUM SERPL-SCNC: 4.4 MMOL/L — SIGNIFICANT CHANGE UP (ref 3.5–5.3)
PROT SERPL-MCNC: 5.6 G/DL — LOW (ref 6–8.3)
PROTHROM AB SERPL-ACNC: 13.9 SEC — HIGH (ref 10.5–13.4)
RBC # BLD: 4.47 M/UL — SIGNIFICANT CHANGE UP (ref 4.2–5.8)
RBC # FLD: 13.1 % — SIGNIFICANT CHANGE UP (ref 10.3–14.5)
RBC BLD AUTO: ABNORMAL
SCHISTOCYTES BLD QL AUTO: SLIGHT — SIGNIFICANT CHANGE UP
SODIUM SERPL-SCNC: 137 MMOL/L — SIGNIFICANT CHANGE UP (ref 135–145)
WBC # BLD: 12.24 K/UL — HIGH (ref 3.8–10.5)
WBC # FLD AUTO: 12.24 K/UL — HIGH (ref 3.8–10.5)

## 2022-04-21 PROCEDURE — 99233 SBSQ HOSP IP/OBS HIGH 50: CPT | Mod: GC

## 2022-04-21 PROCEDURE — 99233 SBSQ HOSP IP/OBS HIGH 50: CPT

## 2022-04-21 RX ORDER — GABAPENTIN 400 MG/1
100 CAPSULE ORAL THREE TIMES A DAY
Refills: 0 | Status: DISCONTINUED | OUTPATIENT
Start: 2022-04-21 | End: 2022-05-09

## 2022-04-21 RX ORDER — ALPRAZOLAM 0.25 MG
0.25 TABLET ORAL ONCE
Refills: 0 | Status: DISCONTINUED | OUTPATIENT
Start: 2022-04-21 | End: 2022-04-21

## 2022-04-21 RX ORDER — ALBUMIN HUMAN 25 %
3250 VIAL (ML) INTRAVENOUS ONCE
Refills: 0 | Status: COMPLETED | OUTPATIENT
Start: 2022-04-22 | End: 2022-04-22

## 2022-04-21 RX ORDER — LANOLIN ALCOHOL/MO/W.PET/CERES
3 CREAM (GRAM) TOPICAL ONCE
Refills: 0 | Status: COMPLETED | OUTPATIENT
Start: 2022-04-21 | End: 2022-04-21

## 2022-04-21 RX ORDER — CALCIUM GLUCONATE 100 MG/ML
2 VIAL (ML) INTRAVENOUS ONCE
Refills: 0 | Status: COMPLETED | OUTPATIENT
Start: 2022-04-22 | End: 2022-04-22

## 2022-04-21 RX ORDER — LANOLIN ALCOHOL/MO/W.PET/CERES
3 CREAM (GRAM) TOPICAL AT BEDTIME
Refills: 0 | Status: DISCONTINUED | OUTPATIENT
Start: 2022-04-21 | End: 2022-05-03

## 2022-04-21 RX ADMIN — TAMSULOSIN HYDROCHLORIDE 0.4 MILLIGRAM(S): 0.4 CAPSULE ORAL at 22:30

## 2022-04-21 RX ADMIN — Medication 58 MILLIGRAM(S): at 22:31

## 2022-04-21 RX ADMIN — GABAPENTIN 100 MILLIGRAM(S): 400 CAPSULE ORAL at 14:27

## 2022-04-21 RX ADMIN — ENOXAPARIN SODIUM 40 MILLIGRAM(S): 100 INJECTION SUBCUTANEOUS at 22:30

## 2022-04-21 RX ADMIN — Medication 3 MILLIGRAM(S): at 01:43

## 2022-04-21 RX ADMIN — PANTOPRAZOLE SODIUM 40 MILLIGRAM(S): 20 TABLET, DELAYED RELEASE ORAL at 18:00

## 2022-04-21 RX ADMIN — CHLORHEXIDINE GLUCONATE 1 APPLICATION(S): 213 SOLUTION TOPICAL at 06:57

## 2022-04-21 RX ADMIN — ERTAPENEM SODIUM 120 MILLIGRAM(S): 1 INJECTION, POWDER, LYOPHILIZED, FOR SOLUTION INTRAMUSCULAR; INTRAVENOUS at 11:41

## 2022-04-21 RX ADMIN — PREGABALIN 1000 MICROGRAM(S): 225 CAPSULE ORAL at 11:42

## 2022-04-21 RX ADMIN — PANTOPRAZOLE SODIUM 40 MILLIGRAM(S): 20 TABLET, DELAYED RELEASE ORAL at 06:57

## 2022-04-21 RX ADMIN — Medication 3 MILLIGRAM(S): at 22:34

## 2022-04-21 RX ADMIN — Medication 0.25 MILLIGRAM(S): at 14:27

## 2022-04-21 RX ADMIN — LACTULOSE 10 GRAM(S): 10 SOLUTION ORAL at 11:42

## 2022-04-21 RX ADMIN — SENNA PLUS 1 TABLET(S): 8.6 TABLET ORAL at 22:30

## 2022-04-21 RX ADMIN — GABAPENTIN 100 MILLIGRAM(S): 400 CAPSULE ORAL at 22:30

## 2022-04-21 NOTE — PROGRESS NOTE ADULT - SUBJECTIVE AND OBJECTIVE BOX
LENGTH OF HOSPITAL STAY: 6d    CHIEF COMPLAINT:   Patient is a 71y old  Male who presents with a chief complaint of TM (21 Apr 2022 08:36)      HISTORY OF PRESENTING ILLNESS:    HPI:  71 M with no past medical history, not on any medications, transferred here from [facility] for bilateral lower extremity weakness, with concern for Transverse Myelitis vs Guillan [place], for plasma exchange. Patient's story dates back to week and a half ago, when he went to [place] for a vacation, developed nausea, vomiting, diarrhea and weakness, accompanied by abd distension and difficulty urinating for which he went to [facility]. There, he was found to be in urosepsis, complicated by urinary obstruction for which Marie Catheter was placed. On day prior to planned discharge 4/12, he started to develop severe paresthesia, numbness, and weakness of bilateral lower extremities. He underwent abdominal imaging and MRI of spine at [facility], and was found to have pancreatic lesion at pancreatic head, and MRI thoracic spine consistent with transverse myelitis. He then was given high dose steroids for three days, which provided minimal improvement of symptoms. As a result, patient transferred to [facility] for possible plasma exchange.   Of note-patient travelled to [place] and [place] in November, for which he received a series of immunizations that he does not recall.     Currently, he is endorsing weakness, tingling, and numbness of his bilateral lower extremities up until the point of his umbilicus. He is denying any other symptoms of his UE, notes no weakness, paresthesias, tingling. Currently only complaint is abdominal pain and constipation.      Relevant imaging/studies from [facility]:  CT AP: Ill defined low density lesion within pancreatic head represent a benign or malignant neoplasm. Measures 2.2 x 2.4 x 3.1 cm which may represent a benign or malignant neoplasm, pancreatic lesion partially compresses the portal vein and superior aspect of SMV. Bowel: Mild diffuse wall thickening of the ascending transverse and upper half of the descending colon which may relate to possible mild non specific colitis.   MRI Thoracic:  Borderline abn signal within the central cord substance throughout entire length of thoracic spine could reflect transverse myelitis.   ESR- 30 CRP- 14.2  CSF total protein 80, CSF PCR negative, others pending (15 Apr 2022 20:38)    PAST MEDICAL & SURGICAL HISTORY:  PAST MEDICAL & SURGICAL HISTORY:    SOCIAL HISTORY:    ALLERGIES:  No Known Allergies    MEDICATIONS:  STANDING MEDICATIONS  albumin human  5% IVPB 3250 milliLiter(s) IV Intermittent once  calcium gluconate IVPB 2 Gram(s) IV Intermittent once  chlorhexidine 2% Cloths 1 Application(s) Topical <User Schedule>  cyanocobalamin 1000 MICROGram(s) Oral daily  enoxaparin Injectable 40 milliGRAM(s) SubCutaneous every 24 hours  ertapenem  IVPB      ertapenem  IVPB 1000 milliGRAM(s) IV Intermittent every 24 hours  gabapentin 100 milliGRAM(s) Oral three times a day  heparin  Lock Flush 100 Units/mL Injectable 600 Unit(s) IV Push once  lactulose Syrup 10 Gram(s) Oral every 24 hours  melatonin 3 milliGRAM(s) Oral at bedtime  methylPREDNISolone sodium succinate IVPB 1000 milliGRAM(s) IV Intermittent every 24 hours  pantoprazole    Tablet 40 milliGRAM(s) Oral two times a day  polyethylene glycol 3350 17 Gram(s) Oral daily  senna 1 Tablet(s) Oral at bedtime  tamsulosin 0.4 milliGRAM(s) Oral at bedtime    PRN MEDICATIONS  acetaminophen     Tablet .. 650 milliGRAM(s) Oral every 6 hours PRN  sodium chloride 0.9% lock flush 10 milliLiter(s) IV Push every 1 hour PRN    VITALS:   T(F): 97.9  HR: 76  BP: 115/67  RR: 18  SpO2: 97%    LABS:                        12.8   12.24 )-----------( 195      ( 21 Apr 2022 06:17 )             38.0     04-21    137  |  105  |  21  ----------------------------<  165<H>  4.4   |  22  |  0.97    Ca    8.5      21 Apr 2022 06:17  Phos  3.6     04-21  Mg     2.2     04-21    TPro  5.6<L>  /  Alb  3.8  /  TBili  0.3  /  DBili  x   /  AST  24  /  ALT  40  /  AlkPhos  49  04-21    PT/INR - ( 21 Apr 2022 06:17 )   PT: 13.9 sec;   INR: 1.17       PTT - ( 21 Apr 2022 06:17 )  PTT:29.3 sec    RADIOLOGY:  < from: Xray Chest 1 View-PORTABLE IMMEDIATE (Xray Chest 1 View-PORTABLE IMMEDIATE .) (04.19.22 @ 20:00) >  Right internal jugular HD. catheter in superior vena cava   Left basilar focal atelectasis, unchanged. Heart size within normal   limits, thoracic aortic calcification. Stable bony structures. Chilaiditi   syndrome.    < end of copied text >    PHYSICAL EXAM:  GEN: No acute distress  HEENT: NCAT  LUNGS: Clear to auscultation bilaterally   HEART: S1/S2 present. RRR.   ABD: Soft, non-tender, non-distended. Bowel sounds present  EXT: No pitting edema  NEURO: AAOX3, 0/5 of bilateral      LENGTH OF HOSPITAL STAY: 6d    CHIEF COMPLAINT:   Patient is a 71y old  Male who presents with a chief complaint of TM (21 Apr 2022 08:36)      HISTORY OF PRESENTING ILLNESS:    HPI:  71 M with no past medical history, not on any medications, transferred here from [facility] for bilateral lower extremity weakness, with concern for Transverse Myelitis vs Guillan [place], for plasma exchange. Patient's story dates back to week and a half ago, when he went to [place] for a vacation, developed nausea, vomiting, diarrhea and weakness, accompanied by abd distension and difficulty urinating for which he went to [facility]. There, he was found to be in urosepsis, complicated by urinary obstruction for which Marie Catheter was placed. On day prior to planned discharge 4/12, he started to develop severe paresthesia, numbness, and weakness of bilateral lower extremities. He underwent abdominal imaging and MRI of spine at [facility], and was found to have pancreatic lesion at pancreatic head, and MRI thoracic spine consistent with transverse myelitis. He then was given high dose steroids for three days, which provided minimal improvement of symptoms. As a result, patient transferred to [facility] for possible plasma exchange.   Of note-patient travelled to [place] and [place] in November, for which he received a series of immunizations that he does not recall.     Currently, he is endorsing weakness, tingling, and numbness of his bilateral lower extremities up until the point of his umbilicus. He is denying any other symptoms of his UE, notes no weakness, paresthesias, tingling. Currently only complaint is abdominal pain and constipation.      Relevant imaging/studies from [facility]:  CT AP: Ill defined low density lesion within pancreatic head represent a benign or malignant neoplasm. Measures 2.2 x 2.4 x 3.1 cm which may represent a benign or malignant neoplasm, pancreatic lesion partially compresses the portal vein and superior aspect of SMV. Bowel: Mild diffuse wall thickening of the ascending transverse and upper half of the descending colon which may relate to possible mild non specific colitis.   MRI Thoracic:  Borderline abn signal within the central cord substance throughout entire length of thoracic spine could reflect transverse myelitis.   ESR- 30 CRP- 14.2  CSF total protein 80, CSF PCR negative, others pending (15 Apr 2022 20:38)    PAST MEDICAL & SURGICAL HISTORY:  None    SOCIAL HISTORY:    ALLERGIES:  No Known Allergies    MEDICATIONS:  STANDING MEDICATIONS  albumin human  5% IVPB 3250 milliLiter(s) IV Intermittent once  calcium gluconate IVPB 2 Gram(s) IV Intermittent once  chlorhexidine 2% Cloths 1 Application(s) Topical <User Schedule>  cyanocobalamin 1000 MICROGram(s) Oral daily  enoxaparin Injectable 40 milliGRAM(s) SubCutaneous every 24 hours  ertapenem  IVPB      ertapenem  IVPB 1000 milliGRAM(s) IV Intermittent every 24 hours  gabapentin 100 milliGRAM(s) Oral three times a day  heparin  Lock Flush 100 Units/mL Injectable 600 Unit(s) IV Push once  lactulose Syrup 10 Gram(s) Oral every 24 hours  melatonin 3 milliGRAM(s) Oral at bedtime  methylPREDNISolone sodium succinate IVPB 1000 milliGRAM(s) IV Intermittent every 24 hours  pantoprazole    Tablet 40 milliGRAM(s) Oral two times a day  polyethylene glycol 3350 17 Gram(s) Oral daily  senna 1 Tablet(s) Oral at bedtime  tamsulosin 0.4 milliGRAM(s) Oral at bedtime    PRN MEDICATIONS  acetaminophen     Tablet .. 650 milliGRAM(s) Oral every 6 hours PRN  sodium chloride 0.9% lock flush 10 milliLiter(s) IV Push every 1 hour PRN    VITALS:   T(F): 97.9  HR: 76  BP: 115/67  RR: 18  SpO2: 97%    LABS:                        12.8   12.24 )-----------( 195      ( 21 Apr 2022 06:17 )             38.0     04-21    137  |  105  |  21  ----------------------------<  165<H>  4.4   |  22  |  0.97    Ca    8.5      21 Apr 2022 06:17  Phos  3.6     04-21  Mg     2.2     04-21    TPro  5.6<L>  /  Alb  3.8  /  TBili  0.3  /  DBili  x   /  AST  24  /  ALT  40  /  AlkPhos  49  04-21    PT/INR - ( 21 Apr 2022 06:17 )   PT: 13.9 sec;   INR: 1.17       PTT - ( 21 Apr 2022 06:17 )  PTT:29.3 sec    RADIOLOGY:  < from: Xray Chest 1 View-PORTABLE IMMEDIATE (Xray Chest 1 View-PORTABLE IMMEDIATE .) (04.19.22 @ 20:00) >  Right internal jugular HD. catheter in superior vena cava   Left basilar focal atelectasis, unchanged. Heart size within normal   limits, thoracic aortic calcification. Stable bony structures. Chilaiditi   syndrome.    < end of copied text >    PHYSICAL EXAM:  GEN: No acute distress  HEENT: NCAT  LUNGS: Clear to auscultation bilaterally   HEART: S1/S2 present. RRR.   ABD: Soft, non-tender, non-distended. Bowel sounds present  EXT: No pitting edema  NEURO: AAOX3, 0/5 of bilateral LE

## 2022-04-21 NOTE — PROGRESS NOTE ADULT - SUBJECTIVE AND OBJECTIVE BOX
MARCIAL ROMANO  71y  Male    Patient is a 71y old  Male who presents with a chief complaint of TRANSVERSE MYELITIS     (20 Apr 2022 20:35)      INTERVAL HPI/OVERNIGHT EVENTS:      T(C): 36.6 (04-21-22 @ 06:05), Max: 36.7 (04-20-22 @ 20:35)  HR: 73 (04-21-22 @ 06:05) (73 - 76)  BP: 127/76 (04-21-22 @ 06:05) (127/76 - 128/69)  RR: 18 (04-21-22 @ 06:05) (17 - 18)  SpO2: 95% (04-21-22 @ 06:05) (93% - 95%)  Wt(kg): --Vital Signs Last 24 Hrs  T(C): 36.6 (21 Apr 2022 06:05), Max: 36.7 (20 Apr 2022 20:35)  T(F): 97.9 (21 Apr 2022 06:05), Max: 98 (20 Apr 2022 20:35)  HR: 73 (21 Apr 2022 06:05) (73 - 76)  BP: 127/76 (21 Apr 2022 06:05) (127/76 - 128/69)  BP(mean): --  RR: 18 (21 Apr 2022 06:05) (17 - 18)  SpO2: 95% (21 Apr 2022 06:05) (93% - 95%)    PHYSICAL EXAM:  GENERAL: NAD, well-groomed, well-developed  HEAD:  Atraumatic, Normocephalic  EYES: EOMI, PERRLA, conjunctiva and sclera clear  ENMT: No tonsillar erythema, exudates, or enlargement; Moist mucous membranes, Good dentition, No lesions  NECK: Supple, No JVD, Normal thyroid  NERVOUS SYSTEM:  Alert & Oriented X3, Good concentration; Motor Strength 5/5 B/L upper and lower extremities; DTRs 2+ intact and symmetric  CHEST/LUNG: Clear to auscultation bilaterally; No rales, rhonchi, wheezing, or rubs  HEART: Regular rate and rhythm; No murmurs, rubs, or gallops  ABDOMEN: Soft, Nontender, Nondistended; Bowel sounds present  EXTREMITIES:  WWP, No clubbing, cyanosis, or edema  Vascular: 2+ peripheral pulses x4  LYMPH: No lymphadenopathy noted  SKIN: No rashes or lesions    Consultant(s) Notes Reviewed:  [x ] YES  [ ] NO  Care Discussed with Consultants/Other Providers [ x] YES  [ ] NO    LABS:                        12.8   12.24 )-----------( 195      ( 21 Apr 2022 06:17 )             38.0     04-21    137  |  105  |  21  ----------------------------<  165<H>  4.4   |  22  |  0.97    Ca    8.5      21 Apr 2022 06:17  Phos  3.6     04-21  Mg     2.2     04-21    TPro  5.6<L>  /  Alb  3.8  /  TBili  0.3  /  DBili  x   /  AST  24  /  ALT  40  /  AlkPhos  49  04-21    Iron 46, TIBC 137, %Sat 34, Ferritin 453/ 04-19-22 @ 07:22    PT/INR - ( 21 Apr 2022 06:17 )   PT: 13.9 sec;   INR: 1.17          PTT - ( 21 Apr 2022 06:17 )  PTT:29.3 sec    CAPILLARY BLOOD GLUCOSE                RADIOLOGY & ADDITIONAL TESTS:    Imaging Personally Reviewed:  [ ] YES  [ ] NO    HEALTH ISSUES - PROBLEM Dx:  Lower extremity weakness    Pancreatic mass    Urinary retention    Preventive measure    Leukocytosis    Transaminitis         MARCIAL ROMANO  71y  Male    Patient is a 71y old  Male who presents with a chief complaint of TRANSVERSE MYELITIS     (20 Apr 2022 20:35)      INTERVAL HPI/OVERNIGHT EVENTS: ANDREA o/n. Pt complaining of lower back spasms.       T(C): 36.6 (04-21-22 @ 06:05), Max: 36.7 (04-20-22 @ 20:35)  HR: 73 (04-21-22 @ 06:05) (73 - 76)  BP: 127/76 (04-21-22 @ 06:05) (127/76 - 128/69)  RR: 18 (04-21-22 @ 06:05) (17 - 18)  SpO2: 95% (04-21-22 @ 06:05) (93% - 95%)  Wt(kg): --Vital Signs Last 24 Hrs  T(C): 36.6 (21 Apr 2022 06:05), Max: 36.7 (20 Apr 2022 20:35)  T(F): 97.9 (21 Apr 2022 06:05), Max: 98 (20 Apr 2022 20:35)  HR: 73 (21 Apr 2022 06:05) (73 - 76)  BP: 127/76 (21 Apr 2022 06:05) (127/76 - 128/69)  BP(mean): --  RR: 18 (21 Apr 2022 06:05) (17 - 18)  SpO2: 95% (21 Apr 2022 06:05) (93% - 95%)    PHYSICAL EXAM:  GENERAL: NAD  HEAD:  Atraumatic, Normocephalic  EYES: EOMI, PERRLA, conjunctiva and sclera clear  ENMT: Moist mucous membranes  NERVOUS SYSTEM:  Alert & Oriented X3, 1/5 strength in the LE B/L. Sensation improving in LLE and R thigh, but still severely diminished in the R shin/calf area. No deficits in the UEs   CHEST/LUNG: Clear to auscultation bilaterally; No rales, rhonchi, wheezing, or rubs  HEART: Regular rate and rhythm; No murmurs, rubs, or gallops  ABDOMEN: Soft, Nontender, mildly distended with hypertympanic percussion and normal BS x4 quadrants  EXTREMITIES:  WWP, No clubbing, cyanosis, or edema  Vascular: 2+ peripheral pulses x4      Consultant(s) Notes Reviewed:  [x ] YES  [ ] NO  Care Discussed with Consultants/Other Providers [ x] YES  [ ] NO    LABS:                        12.8   12.24 )-----------( 195      ( 21 Apr 2022 06:17 )             38.0     04-21    137  |  105  |  21  ----------------------------<  165<H>  4.4   |  22  |  0.97    Ca    8.5      21 Apr 2022 06:17  Phos  3.6     04-21  Mg     2.2     04-21    TPro  5.6<L>  /  Alb  3.8  /  TBili  0.3  /  DBili  x   /  AST  24  /  ALT  40  /  AlkPhos  49  04-21    Iron 46, TIBC 137, %Sat 34, Ferritin 453/ 04-19-22 @ 07:22    PT/INR - ( 21 Apr 2022 06:17 )   PT: 13.9 sec;   INR: 1.17          PTT - ( 21 Apr 2022 06:17 )  PTT:29.3 sec    CAPILLARY BLOOD GLUCOSE                RADIOLOGY & ADDITIONAL TESTS:    Imaging Personally Reviewed:  [ ] YES  [ ] NO    HEALTH ISSUES - PROBLEM Dx:  Lower extremity weakness    Pancreatic mass    Urinary retention    Preventive measure    Leukocytosis    Transaminitis

## 2022-04-21 NOTE — PROGRESS NOTE ADULT - ASSESSMENT
70 yo M with no significant PMHx, transferred from [facility] for bilateral lower extremity weakness, with working diagnosis of thoracic transverse myelitis, s/p lumbar puncture at [facility], failed high-dose steroids and expected to have minimal-no response to IVIG per Neurology. Incidentally found ot have a cystic pancreatic head lesion (2.2 x 2.4 x 3.1 cm), suspected IPMN, with partial compression of portal vein and superior aspect of SMV with non-specific colitis of ascending transverse and upper half of descending colon. MRCP (04/16/22) showed a complex cystic pancreatic head lesion, either IPMN or atypical serous cystadenoma, with recommendation for EUS and biopsy sampling. Hematology consulted for initiation of PLEX on 04/20/22 per Neurology.    #) DIAGNOSIS  - Steroid- and IVIG-refractory thoracic transverse myelitis  - Complex cystic pancreatic head mass     #) PLAN  - Per Neurology, planning for 5 sessions of PLEX starting on 04/20/22 via HD catheter (completed 1 of 5), planning for 2nd session of PLEX 04/22/22.   - Will need to trend CBC with differential, CMP once daily. Will need to trend PT/PTT and fibrinogen for AM tomorrow  - Follow-up with GI for eventual biopsy of the pancreatic head lesion.   - Maintain active T+S, transfuse if Hb < 7 or if actively bleeding     Discussed with Dr. Anne

## 2022-04-21 NOTE — PROGRESS NOTE ADULT - PROBLEM SELECTOR PLAN 1
Hx diarrhea two weeks prior to acute LE weakness, imaging consistent with transverse myelitis. Elevated inflammatory markers. Lumbar tap done at [facility]-for which studies pending. s/p x3d high dose steroids with minimal improvement in symptoms.   - s/p IVIG 0.65g/kg x3 days, HD cath placed 4/19 for PLEX starting 4/20 x5 sessions(heme/onc following)  - MR head and CSpine w/wout - faint T2 hyperintensity in posterior lower cervical cord without definite enhancement   - NMO and anti-MOG antibodies  - Autoimmune myelopathy panel sent 4/20 6pm

## 2022-04-21 NOTE — PROGRESS NOTE ADULT - PROBLEM SELECTOR PLAN 4
Downtrending LFTs, likely 2/2 pancreatic mass versus autoimmune etiology. No significant drinking or drug history. No tenderness on exam, no organomegaly.   - follow malignancy workup as above.  - f/u numerous titers and PCR's ordered per GI

## 2022-04-21 NOTE — PROGRESS NOTE ADULT - ASSESSMENT
71M w/ PMHx BPH, recent h/o viral diarrhea x 1 week ago, c/o progressive weakness. Found urinary retention and transverse myelitis on MRI. Worsened despite IV steroids, transferred from [facility] for possible plasma exchange. Medicine consulted for fever.     NEURO:  #Bilateral Lower Extremity Weakness  Most likely Transverse myelitis , Received 3 days of steroids prior to transfer to [facility] from [facility] , started on IVIG per primary team on 4/16. Restarted another course of high-dose steroids on 4/19. Plan for PLEX today   - Further investigation and treatment per primary team     ID:  #UTI  On 4/17 intermittently tachycardic,  o2 sats on room air decreased to 93% on room air from 100% , Fever 100.5. CT chest at [facility] which revealed plate like atelectasis in the Lingula. UA +, s/p exchange of nation catheter ( placed at [facility] due to urinary retention ). CTPE negative.   - Ertapenem x5 days 1g QD. Urine Cx (currently showing ESBL E.coli >100k)    :  #Indwelling Nation  - Please perform TOV as pt mobility is slightly improving and ESBL UTI found     GI:  #Constipation  2 BMs since yesterday's enema. No abd pain or tenderness, no N/V.   - Enema PRN       #Elevated Transaminases  Ultrasound RUQ, without gall bladder disease , + for mild hepatic steatosis   - LFTs downtrending, now normal   - F/u GI recs     #Small Pancreatic cystic mass   Negative  - GI consulted, planning for EUS and Biopsy      DVT prophylaxis: Lovenox     INCOMPLETE    71M w/ PMHx BPH, recent h/o viral diarrhea x 1 week ago, c/o progressive weakness. Found urinary retention and transverse myelitis on MRI. Worsened despite IV steroids, transferred from [facility] for possible plasma exchange. Medicine consulted for fever.     NEURO:  #Bilateral Lower Extremity Weakness  Most likely Transverse myelitis , Received 3 days of steroids prior to transfer to [facility] from [facility] , started on IVIG per primary team on 4/16. Restarted another course of high-dose steroids on 4/19. Plan for PLEX today   - Further investigation and treatment per primary team     ID:  #UTI  On 4/17 intermittently tachycardic,  o2 sats on room air decreased to 93% on room air from 100% , Fever 100.5. CT chest at [facility] which revealed plate like atelectasis in the Lingula. UA +, s/p exchange of nation catheter ( placed at [facility] due to urinary retention ). CTPE negative.   - Ertapenem x5 days 1g QD. Urine Cx (currently showing ESBL E.coli >100k)    :  #Indwelling Nation  - Please perform TOV as pt mobility is slightly improving and ESBL UTI found     GI:  #Constipation  2 BMs since yesterday's enema. No abd pain or tenderness, no N/V.   - Enema QD  - If pt does not have a BM today and increasing abdominal distension obtain AXR       #Elevated Transaminases  Ultrasound RUQ, without gall bladder disease , + for mild hepatic steatosis   - LFTs downtrending, now normal   - F/u GI recs     #Small Pancreatic cystic mass   Negative  - GI consulted, planning for EUS and Biopsy      DVT prophylaxis: Lovenox     D/w attending    71M w/ PMHx BPH, recent h/o viral diarrhea x 1 week ago, c/o progressive weakness. Found urinary retention and transverse myelitis on MRI. Worsened despite IV steroids, transferred from [facility] for possible plasma exchange. Medicine consulted for fever.     NEURO:  #Bilateral Lower Extremity Weakness  Most likely Transverse myelitis , Received 3 days of steroids prior to transfer to [facility] from [facility] , started on IVIG per primary team on 4/16. Restarted another course of high-dose steroids on 4/19. Plan for PLEX today   - Further investigation and treatment per primary team   - Pt experiencing back spasm if no neuro contraindication can start baclofen 5mg TID prn     #Anxiety  Pt exhibiting anxiety preventing sleep while here i/s/o his illness  - Can do 0.25mg PO xanax prn for anxiety    ID:  #UTI  On 4/17 intermittently tachycardic,  o2 sats on room air decreased to 93% on room air from 100% , Fever 100.5. CT chest at [facility] which revealed plate like atelectasis in the Lingula. UA +, s/p exchange of nation catheter ( placed at [facility] due to urinary retention ). CTPE negative.   - Ertapenem x5 days 1g QD. Urine Cx (currently showing ESBL E.coli >100k)    :  #Indwelling Nation  - Please perform TOV as pt mobility is slightly improving and ESBL UTI found     GI:  #Constipation  2 BMs since yesterday's enema. No abd pain or tenderness, no N/V.   - Enema QD  - If pt does not have a BM today and increasing abdominal distension obtain AXR       #Elevated Transaminases  Ultrasound RUQ, without gall bladder disease , + for mild hepatic steatosis   - LFTs downtrending, now normal   - F/u GI recs     #Small Pancreatic cystic mass   Negative  - GI consulted, planning for EUS and Biopsy      DVT prophylaxis: Lovenox     D/w attending

## 2022-04-21 NOTE — PROGRESS NOTE ADULT - SUBJECTIVE AND OBJECTIVE BOX
MARCIAL ROMANO, 71y, Male  MRN-[id number]  Patient is a 71y old  Male who presents with a chief complaint of TM (21 Apr 2022 08:36)      OVERNIGHT EVENTS: never pulled nation     SUBJECTIVE: Patient seen and examined at bedside. Reports back spasms. Denies fevers, chills, HA, chest pain, sob, nausea, vomiting, abdominal pain, diarrhea, constipation, dysuria.     12 Point ROS Negative unless noted otherwise above.  -------------------------------------------------------------------------------  VITAL SIGNS:  Vital Signs Last 24 Hrs  T(C): 36.6 (21 Apr 2022 06:05), Max: 36.7 (20 Apr 2022 20:35)  T(F): 97.9 (21 Apr 2022 06:05), Max: 98 (20 Apr 2022 20:35)  HR: 73 (21 Apr 2022 06:05) (73 - 76)  BP: 127/76 (21 Apr 2022 06:05) (127/76 - 128/69)  BP(mean): --  RR: 18 (21 Apr 2022 06:05) (17 - 18)  SpO2: 95% (21 Apr 2022 06:05) (93% - 95%)  I&O's Summary    20 Apr 2022 07:01  -  21 Apr 2022 07:00  --------------------------------------------------------  IN: 0 mL / OUT: 1350 mL / NET: -1350 mL    21 Apr 2022 07:01  -  21 Apr 2022 11:03  --------------------------------------------------------  IN: 0 mL / OUT: 600 mL / NET: -600 mL        PHYSICAL EXAM:    Constitutional: NAD, comfortable in bed.  HEENT: NC/AT, PERRLA, EOMI, no conjunctival pallor or scleral icterus, MMM  Neck: Supple, no JVD  Respiratory: CTA B/L. No w/r/r.   Cardiovascular: RRR, normal S1 and S2, no m/r/g.   Gastrointestinal: +BS, soft NTND, no guarding or rebound tenderness, no palpable masses   Extremities: wwp; no cyanosis, clubbing or edema.   Vascular: Pulses equal and strong throughout.   Neurological: AAOx3, no CN deficits, sensation intact throughout, b/l LE strength 0/5   Skin: No gross skin abnormalities or rashes      ALLERGIES:  Allergies    No Known Allergies    Intolerances        MEDICATIONS:  MEDICATIONS  (STANDING):  albumin human  5% IVPB 3250 milliLiter(s) IV Intermittent once  calcium gluconate IVPB 2 Gram(s) IV Intermittent once  chlorhexidine 2% Cloths 1 Application(s) Topical <User Schedule>  cyanocobalamin 1000 MICROGram(s) Oral daily  enoxaparin Injectable 40 milliGRAM(s) SubCutaneous every 24 hours  ertapenem  IVPB      ertapenem  IVPB 1000 milliGRAM(s) IV Intermittent every 24 hours  heparin  Lock Flush 100 Units/mL Injectable 600 Unit(s) IV Push once  lactulose Syrup 10 Gram(s) Oral every 24 hours  melatonin 3 milliGRAM(s) Oral at bedtime  methylPREDNISolone sodium succinate IVPB 1000 milliGRAM(s) IV Intermittent every 24 hours  pantoprazole    Tablet 40 milliGRAM(s) Oral two times a day  polyethylene glycol 3350 17 Gram(s) Oral daily  senna 1 Tablet(s) Oral at bedtime  tamsulosin 0.4 milliGRAM(s) Oral at bedtime    MEDICATIONS  (PRN):  acetaminophen     Tablet .. 650 milliGRAM(s) Oral every 6 hours PRN Temp greater or equal to 38C (100.4F), Mild Pain (1 - 3)  sodium chloride 0.9% lock flush 10 milliLiter(s) IV Push every 1 hour PRN Pre/post blood products, medications, blood draw, and to maintain line patency      -------------------------------------------------------------------------------  LABS:                        12.8   12.24 )-----------( 195      ( 21 Apr 2022 06:17 )             38.0     04-21    137  |  105  |  21  ----------------------------<  165<H>  4.4   |  22  |  0.97    Ca    8.5      21 Apr 2022 06:17  Phos  3.6     04-21  Mg     2.2     04-21    TPro  5.6<L>  /  Alb  3.8  /  TBili  0.3  /  DBili  x   /  AST  24  /  ALT  40  /  AlkPhos  49  04-21    LIVER FUNCTIONS - ( 21 Apr 2022 06:17 )  Alb: 3.8 g/dL / Pro: 5.6 g/dL / ALK PHOS: 49 U/L / ALT: 40 U/L / AST: 24 U/L / GGT: x           PT/INR - ( 21 Apr 2022 06:17 )   PT: 13.9 sec;   INR: 1.17          PTT - ( 21 Apr 2022 06:17 )  PTT:29.3 sec    CAPILLARY BLOOD GLUCOSE              RADIOLOGY & ADDITIONAL TESTS: Reviewed.

## 2022-04-21 NOTE — PROGRESS NOTE ADULT - PROBLEM SELECTOR PLAN 5
Patient initially to [facility] with urosepsis c/b urinary retention, likely 2/2 neurological process. Nation placed at [facility].   -continue with flomax 0.4 QHS   -neuro workup as above  - d/c'd nation 4/20, f/u ToV    #Complicated UTI   s/p CTX  - UClx growing >100K ESBL E. Coli, started Ertapenem 1g q24h x5days

## 2022-04-21 NOTE — PROGRESS NOTE ADULT - ASSESSMENT
71 M with no past medical history, not on any medications, transferred here from [facility] for bilateral lower extremity weakness, with concern for Transverse Myelitis

## 2022-04-21 NOTE — PROGRESS NOTE ADULT - PROBLEM SELECTOR PLAN 3
RESOLVED. Presented with WBC of 16, elevated at [facility] as well. Could be 2/2 infection-history of UTI at [facility] treated with 5 day course of CTX, denying any complaints. No cough, no persistent GI symptoms, no diarrhea, no vomiting. Could be 2/2 reactive process given neurological symptomology. Could also be 2/2 prednisone.   - UA positive, nation exchanged   - CXR at [facility] clear without infiltrates  - Patient currently constipated-unlikely to be GI source.

## 2022-04-21 NOTE — PROGRESS NOTE ADULT - PROBLEM SELECTOR PLAN 2
CT AP 2.3 x 2.4 x 3.1 at pancreatic head, as per records, workup initiated at [facility]; however, unsure of results.  - Ca-19-9 and CEA negative at [facility]   - MR abdomen/pelvis w cont - pancreatic mass indeterminate etiology    - GI consult - tentative plan for EGD/EUS

## 2022-04-22 LAB
ALBUMIN SERPL ELPH-MCNC: 3.8 G/DL — SIGNIFICANT CHANGE UP (ref 3.3–5)
ALP SERPL-CCNC: 53 U/L — SIGNIFICANT CHANGE UP (ref 40–120)
ALT FLD-CCNC: 81 U/L — HIGH (ref 10–45)
ANA PAT FLD IF-IMP: ABNORMAL
ANA TITR SER: ABNORMAL
ANION GAP SERPL CALC-SCNC: 10 MMOL/L — SIGNIFICANT CHANGE UP (ref 5–17)
APTT BLD: 24.6 SEC — LOW (ref 27.5–35.5)
AST SERPL-CCNC: 51 U/L — HIGH (ref 10–40)
BASOPHILS # BLD AUTO: 0.01 K/UL — SIGNIFICANT CHANGE UP (ref 0–0.2)
BASOPHILS NFR BLD AUTO: 0.1 % — SIGNIFICANT CHANGE UP (ref 0–2)
BILIRUB SERPL-MCNC: 0.4 MG/DL — SIGNIFICANT CHANGE UP (ref 0.2–1.2)
BUN SERPL-MCNC: 29 MG/DL — HIGH (ref 7–23)
CALCIUM SERPL-MCNC: 8.5 MG/DL — SIGNIFICANT CHANGE UP (ref 8.4–10.5)
CHLORIDE SERPL-SCNC: 104 MMOL/L — SIGNIFICANT CHANGE UP (ref 96–108)
CMV DNA CSF QL NAA+PROBE: SIGNIFICANT CHANGE UP
CO2 SERPL-SCNC: 22 MMOL/L — SIGNIFICANT CHANGE UP (ref 22–31)
CREAT SERPL-MCNC: 1.04 MG/DL — SIGNIFICANT CHANGE UP (ref 0.5–1.3)
CULTURE RESULTS: SIGNIFICANT CHANGE UP
CULTURE RESULTS: SIGNIFICANT CHANGE UP
DSDNA AB FLD-ACNC: <0.2 AI — SIGNIFICANT CHANGE UP
EGFR: 77 ML/MIN/1.73M2 — SIGNIFICANT CHANGE UP
ENA SS-A AB FLD IA-ACNC: 3.2 AI — HIGH
EOSINOPHIL # BLD AUTO: 0 K/UL — SIGNIFICANT CHANGE UP (ref 0–0.5)
EOSINOPHIL NFR BLD AUTO: 0 % — SIGNIFICANT CHANGE UP (ref 0–6)
FIBRINOGEN PPP-MCNC: 192 MG/DL — LOW (ref 258–438)
GLUCOSE SERPL-MCNC: 159 MG/DL — HIGH (ref 70–99)
HCT VFR BLD CALC: 38.6 % — LOW (ref 39–50)
HGB BLD-MCNC: 12.4 G/DL — LOW (ref 13–17)
IMM GRANULOCYTES NFR BLD AUTO: 1.7 % — HIGH (ref 0–1.5)
INR BLD: 1.04 — SIGNIFICANT CHANGE UP (ref 0.88–1.16)
LYMPHOCYTES # BLD AUTO: 0.83 K/UL — LOW (ref 1–3.3)
LYMPHOCYTES # BLD AUTO: 9.9 % — LOW (ref 13–44)
MAGNESIUM SERPL-MCNC: 2.4 MG/DL — SIGNIFICANT CHANGE UP (ref 1.6–2.6)
MCHC RBC-ENTMCNC: 27.9 PG — SIGNIFICANT CHANGE UP (ref 27–34)
MCHC RBC-ENTMCNC: 32.1 GM/DL — SIGNIFICANT CHANGE UP (ref 32–36)
MCV RBC AUTO: 86.9 FL — SIGNIFICANT CHANGE UP (ref 80–100)
MOG AB SER QL CBA IFA: NEGATIVE — SIGNIFICANT CHANGE UP
MONOCYTES # BLD AUTO: 0.15 K/UL — SIGNIFICANT CHANGE UP (ref 0–0.9)
MONOCYTES NFR BLD AUTO: 1.8 % — LOW (ref 2–14)
NEUTROPHILS # BLD AUTO: 7.26 K/UL — SIGNIFICANT CHANGE UP (ref 1.8–7.4)
NEUTROPHILS NFR BLD AUTO: 86.5 % — HIGH (ref 43–77)
NRBC # BLD: 0 /100 WBCS — SIGNIFICANT CHANGE UP (ref 0–0)
PHOSPHATE SERPL-MCNC: 4.1 MG/DL — SIGNIFICANT CHANGE UP (ref 2.5–4.5)
PLATELET # BLD AUTO: 187 K/UL — SIGNIFICANT CHANGE UP (ref 150–400)
POTASSIUM SERPL-MCNC: 4.4 MMOL/L — SIGNIFICANT CHANGE UP (ref 3.5–5.3)
POTASSIUM SERPL-SCNC: 4.4 MMOL/L — SIGNIFICANT CHANGE UP (ref 3.5–5.3)
PROT SERPL-MCNC: 6 G/DL — SIGNIFICANT CHANGE UP (ref 6–8.3)
PROTHROM AB SERPL-ACNC: 12.4 SEC — SIGNIFICANT CHANGE UP (ref 10.5–13.4)
RBC # BLD: 4.44 M/UL — SIGNIFICANT CHANGE UP (ref 4.2–5.8)
RBC # FLD: 13.4 % — SIGNIFICANT CHANGE UP (ref 10.3–14.5)
SARS-COV-2 RNA SPEC QL NAA+PROBE: SIGNIFICANT CHANGE UP
SODIUM SERPL-SCNC: 136 MMOL/L — SIGNIFICANT CHANGE UP (ref 135–145)
SPECIMEN SOURCE: SIGNIFICANT CHANGE UP
SPECIMEN SOURCE: SIGNIFICANT CHANGE UP
WBC # BLD: 8.39 K/UL — SIGNIFICANT CHANGE UP (ref 3.8–10.5)
WBC # FLD AUTO: 8.39 K/UL — SIGNIFICANT CHANGE UP (ref 3.8–10.5)

## 2022-04-22 PROCEDURE — 99232 SBSQ HOSP IP/OBS MODERATE 35: CPT

## 2022-04-22 PROCEDURE — 99233 SBSQ HOSP IP/OBS HIGH 50: CPT | Mod: GC

## 2022-04-22 RX ORDER — SIMETHICONE 80 MG/1
80 TABLET, CHEWABLE ORAL
Refills: 0 | Status: DISCONTINUED | OUTPATIENT
Start: 2022-04-22 | End: 2022-05-09

## 2022-04-22 RX ADMIN — TAMSULOSIN HYDROCHLORIDE 0.4 MILLIGRAM(S): 0.4 CAPSULE ORAL at 21:46

## 2022-04-22 RX ADMIN — ERTAPENEM SODIUM 120 MILLIGRAM(S): 1 INJECTION, POWDER, LYOPHILIZED, FOR SOLUTION INTRAMUSCULAR; INTRAVENOUS at 11:14

## 2022-04-22 RX ADMIN — Medication 1625 MILLILITER(S): at 09:05

## 2022-04-22 RX ADMIN — PANTOPRAZOLE SODIUM 40 MILLIGRAM(S): 20 TABLET, DELAYED RELEASE ORAL at 18:14

## 2022-04-22 RX ADMIN — SENNA PLUS 1 TABLET(S): 8.6 TABLET ORAL at 21:46

## 2022-04-22 RX ADMIN — Medication 200 GRAM(S): at 09:02

## 2022-04-22 RX ADMIN — Medication 58 MILLIGRAM(S): at 21:46

## 2022-04-22 RX ADMIN — Medication 3 MILLIGRAM(S): at 21:46

## 2022-04-22 RX ADMIN — GABAPENTIN 100 MILLIGRAM(S): 400 CAPSULE ORAL at 14:14

## 2022-04-22 RX ADMIN — Medication 600 UNIT(S): at 09:03

## 2022-04-22 RX ADMIN — ENOXAPARIN SODIUM 40 MILLIGRAM(S): 100 INJECTION SUBCUTANEOUS at 21:46

## 2022-04-22 RX ADMIN — SIMETHICONE 80 MILLIGRAM(S): 80 TABLET, CHEWABLE ORAL at 18:14

## 2022-04-22 RX ADMIN — LACTULOSE 10 GRAM(S): 10 SOLUTION ORAL at 11:13

## 2022-04-22 RX ADMIN — GABAPENTIN 100 MILLIGRAM(S): 400 CAPSULE ORAL at 21:46

## 2022-04-22 RX ADMIN — PREGABALIN 1000 MICROGRAM(S): 225 CAPSULE ORAL at 11:14

## 2022-04-22 RX ADMIN — CHLORHEXIDINE GLUCONATE 1 APPLICATION(S): 213 SOLUTION TOPICAL at 06:38

## 2022-04-22 RX ADMIN — GABAPENTIN 100 MILLIGRAM(S): 400 CAPSULE ORAL at 06:29

## 2022-04-22 RX ADMIN — PANTOPRAZOLE SODIUM 40 MILLIGRAM(S): 20 TABLET, DELAYED RELEASE ORAL at 06:28

## 2022-04-22 NOTE — PROGRESS NOTE ADULT - PROBLEM SELECTOR PLAN 1
Hx diarrhea two weeks prior to acute LE weakness, imaging consistent with transverse myelitis. Elevated inflammatory markers. Lumbar tap done at [facility]-for which studies pending. s/p x3d high dose steroids with minimal improvement in symptoms.   - s/p IVIG 0.65g/kg x3 days, HD cath placed 4/19 for PLEX starting 4/20 x5 sessions(heme/onc following)  - MR head and CSpine w/wout - faint T2 hyperintensity in posterior lower cervical cord without definite enhancement   - NMO and anti-MOG antibodies  - Autoimmune myelopathy panel sent 4/20 6pm  - PT undergoing PLEX therapy Hx diarrhea two weeks prior to acute LE weakness, imaging consistent with transverse myelitis. Elevated inflammatory markers. Lumbar tap done at [facility]-for which studies pending. s/p x3d high dose steroids with minimal improvement in symptoms.   - s/p IVIG 0.65g/kg x3 days, HD cath placed 4/19 for PLEX starting 4/20 x5 sessions(heme/onc following)  - MR head and CSpine w/wout - faint T2 hyperintensity in posterior lower cervical cord without definite enhancement   - NMO and anti-MOG antibodies  - Autoimmune myelopathy panel sent 4/20 6pm  - PT undergoing PLEX therapy started 4/20, to be done every other day

## 2022-04-22 NOTE — PROGRESS NOTE ADULT - SUBJECTIVE AND OBJECTIVE BOX
LENGTH OF HOSPITAL STAY: 7d    SUBJECTIVE: Tolerated 2nd session of PLEX well.    HISTORY OF PRESENTING ILLNESS:   71 M with no past medical history, not on any medications, transferred here from [facility] for bilateral lower extremity weakness, with concern for Transverse Myelitis vs [place], for plasma exchange. Patient's story dates back to week and a half ago, when he went to [place] for a vacation, developed nausea, vomiting, diarrhea and weakness, accompanied by abd distension and difficulty urinating for which he went to [facility]. There, he was found to be in urosepsis, complicated by urinary obstruction for which Marie Catheter was placed. On day prior to planned discharge 4/12, he started to develop severe paresthesia, numbness, and weakness of bilateral lower extremities. He underwent abdominal imaging and MRI of spine at [facility], and was found to have pancreatic lesion at pancreatic head, and MRI thoracic spine consistent with transverse myelitis. He then was given high dose steroids for three days, which provided minimal improvement of symptoms. As a result, patient transferred to [facility] for possible plasma exchange.   Of note-patient travelled to [place] and [place] in November, for which he received a series of immunizations that he does not recall.     Currently, he is endorsing weakness, tingling, and numbness of his bilateral lower extremities up until the point of his umbilicus. He is denying any other symptoms of his UE, notes no weakness, paresthesias, tingling. Currently only complaint is abdominal pain and constipation.      Relevant imaging/studies from [facility]:  CT AP: Ill defined low density lesion within pancreatic head represent a benign or malignant neoplasm. Measures 2.2 x 2.4 x 3.1 cm which may represent a benign or malignant neoplasm, pancreatic lesion partially compresses the portal vein and superior aspect of SMV. Bowel: Mild diffuse wall thickening of the ascending transverse and upper half of the descending colon which may relate to possible mild non specific colitis.   MRI Thoracic:  Borderline abn signal within the central cord substance throughout entire length of thoracic spine could reflect transverse myelitis.   ESR- 30 CRP- 14.2  CSF total protein 80, CSF PCR negative, others pending (15 Apr 2022 20:38)    PAST MEDICAL & SURGICAL HISTORY:  Negative    ALLERGIES:  No Known Allergies    MEDICATIONS:  STANDING MEDICATIONS  chlorhexidine 2% Cloths 1 Application(s) Topical <User Schedule>  cyanocobalamin 1000 MICROGram(s) Oral daily  enoxaparin Injectable 40 milliGRAM(s) SubCutaneous every 24 hours  ertapenem  IVPB      ertapenem  IVPB 1000 milliGRAM(s) IV Intermittent every 24 hours  gabapentin 100 milliGRAM(s) Oral three times a day  lactulose Syrup 10 Gram(s) Oral every 24 hours  melatonin 3 milliGRAM(s) Oral at bedtime  methylPREDNISolone sodium succinate IVPB 1000 milliGRAM(s) IV Intermittent every 24 hours  pantoprazole    Tablet 40 milliGRAM(s) Oral two times a day  polyethylene glycol 3350 17 Gram(s) Oral daily  senna 1 Tablet(s) Oral at bedtime  simethicone 80 milliGRAM(s) Chew two times a day  tamsulosin 0.4 milliGRAM(s) Oral at bedtime    PRN MEDICATIONS  acetaminophen     Tablet .. 650 milliGRAM(s) Oral every 6 hours PRN  sodium chloride 0.9% lock flush 10 milliLiter(s) IV Push every 1 hour PRN    VITALS:   T(F): 98  HR: 74  BP: 107/66  RR: 18  SpO2: 94%    LABS:                        12.4   8.39  )-----------( 187      ( 22 Apr 2022 07:41 )             38.6     04-22    136  |  104  |  29<H>  ----------------------------<  159<H>  4.4   |  22  |  1.04    Ca    8.5      22 Apr 2022 07:41  Phos  4.1     04-22  Mg     2.4     04-22    TPro  6.0  /  Alb  3.8  /  TBili  0.4  /  DBili  x   /  AST  51<H>  /  ALT  81<H>  /  AlkPhos  53  04-22    PT/INR - ( 22 Apr 2022 07:41 )   PT: 12.4 sec;   INR: 1.04        PTT - ( 22 Apr 2022 07:41 )  PTT:24.6 sec    RADIOLOGY:  Reviewed    PHYSICAL EXAM:  GEN: No acute distress  HEENT: NCAT  LUNGS: Clear to auscultation bilaterally   HEART: S1/S2 present. RRR.   ABD: Soft, non-tender, non-distended. Bowel sounds present  EXT: No pitting edema, 0/5 LLE/RLE  NEURO: AAOX3

## 2022-04-22 NOTE — PROGRESS NOTE ADULT - ASSESSMENT
71 M with no past medical history, not on any medications, transferred here from [facility] for bilateral lower extremity weakness, with concern for Transverse Myelitis

## 2022-04-22 NOTE — PROGRESS NOTE ADULT - SUBJECTIVE AND OBJECTIVE BOX
GASTROENTEROLOGY PROGRESS NOTE  Patient seen and examined at bedside. Feels the sensation in his lower extremities is improving, has more sensation in terms of bladder fullness but feels he does not yet have urinary control. No abdominal pain, fevers, chills. Discussed HBV serologies and patient remembers history of exposure in his 20s. Never underwent therapy that he is aware of.     PERTINENT REVIEW OF SYSTEMS:  CONSTITUTIONAL: No weakness, fevers or chills  HEENT: No visual changes; No vertigo or throat pain   GASTROINTESTINAL: No abdominal or epigastric pain. No nausea, vomiting, or hematemesis; No diarrhea or constipation. No melena or hematochezia.  NEUROLOGICAL: No numbness or weakness  SKIN: No itching, burning, rashes, or lesions     Allergies    No Known Allergies    Intolerances      MEDICATIONS:  MEDICATIONS  (STANDING):  chlorhexidine 2% Cloths 1 Application(s) Topical <User Schedule>  cyanocobalamin 1000 MICROGram(s) Oral daily  enoxaparin Injectable 40 milliGRAM(s) SubCutaneous every 24 hours  ertapenem  IVPB      ertapenem  IVPB 1000 milliGRAM(s) IV Intermittent every 24 hours  gabapentin 100 milliGRAM(s) Oral three times a day  lactulose Syrup 10 Gram(s) Oral every 24 hours  melatonin 3 milliGRAM(s) Oral at bedtime  methylPREDNISolone sodium succinate IVPB 1000 milliGRAM(s) IV Intermittent every 24 hours  pantoprazole    Tablet 40 milliGRAM(s) Oral two times a day  polyethylene glycol 3350 17 Gram(s) Oral daily  senna 1 Tablet(s) Oral at bedtime  simethicone 80 milliGRAM(s) Chew two times a day  tamsulosin 0.4 milliGRAM(s) Oral at bedtime    MEDICATIONS  (PRN):  acetaminophen     Tablet .. 650 milliGRAM(s) Oral every 6 hours PRN Temp greater or equal to 38C (100.4F), Mild Pain (1 - 3)  sodium chloride 0.9% lock flush 10 milliLiter(s) IV Push every 1 hour PRN Pre/post blood products, medications, blood draw, and to maintain line patency    Vital Signs Last 24 Hrs  T(C): 36.6 (22 Apr 2022 05:12), Max: 36.6 (21 Apr 2022 11:51)  T(F): 97.9 (22 Apr 2022 05:12), Max: 97.9 (21 Apr 2022 11:51)  HR: 64 (22 Apr 2022 05:12) (64 - 76)  BP: 123/73 (22 Apr 2022 05:12) (115/67 - 123/73)  BP(mean): --  RR: 18 (22 Apr 2022 05:12) (18 - 18)  SpO2: 94% (22 Apr 2022 05:12) (94% - 97%)    04-21 @ 07:01  -  04-22 @ 07:00  --------------------------------------------------------  IN: 0 mL / OUT: 1600 mL / NET: -1600 mL      PHYSICAL EXAM:    General: Well developed; well nourished; in no acute distress  HEENT: MMM, conjunctiva and sclera clear; plasma exchange catheter in R neck  Gastrointestinal: Soft non-tender non-distended; Normal bowel sounds; No hepatosplenomegaly. No rebound or guarding  Skin: Warm and dry. No obvious rash    LABS:                        12.4   8.39  )-----------( 187      ( 22 Apr 2022 07:41 )             38.6     04-22    136  |  104  |  29<H>  ----------------------------<  159<H>  4.4   |  22  |  1.04    Ca    8.5      22 Apr 2022 07:41  Phos  4.1     04-22  Mg     2.4     04-22    TPro  6.0  /  Alb  3.8  /  TBili  0.4  /  DBili  x   /  AST  51<H>  /  ALT  81<H>  /  AlkPhos  53  04-22    PT/INR - ( 22 Apr 2022 07:41 )   PT: 12.4 sec;   INR: 1.04          PTT - ( 22 Apr 2022 07:41 )  PTT:24.6 sec                  RADIOLOGY & ADDITIONAL STUDIES:  Reviewed

## 2022-04-22 NOTE — PROGRESS NOTE ADULT - ASSESSMENT
71yM w/no PMHx p/w bl LE weakness and numbness, with imaging suggestive of transverse myelitis. GI consulted for incidentally found pancreatic lesion on CT.    Abnormal imaging - CT A/P at OSH w/ill-defined low-density lesion in the pancreatic head/uncinate, ~2.2x2.4x3.1 cm, abutting the portal vein, superior aspect of SMV. CEA and Ca19-9 both within normal limits. Follow-up MR with multi-cystic structure in the uncinate measuring up to 3.5cm w/associated PD dilation; possible PD communication. Differential includes a large serous cystadenoma or more likely a main duct IPMN.  - EGD/EUS after plasmapheresis and acute neurologic issues resolved    Transaminitis - Patient noted to have transaminitis on presentation in hepatocellular pattern, fluctuating throughout hospital course. T bili and alk phos normal. Labs show history of cleared HBV infection, HDV and HEV serologies pending. Low ceruloplasmin, otherwise no abnormalities.  - Obtain 24 hour urinary copper level  - No abnormalities on MR or US  - Daily MELD labs - CMP and Coags    Recommendations discussed with primary team  Case discussed with attending physician

## 2022-04-22 NOTE — PROGRESS NOTE ADULT - PROBLEM SELECTOR PLAN 2
CT AP 2.3 x 2.4 x 3.1 at pancreatic head, as per records, workup initiated at [facility]; however, unsure of results.  - Ca-19-9 and CEA negative at [facility]   - MR abdomen/pelvis w cont - pancreatic mass indeterminate etiology    - f/u GI recs

## 2022-04-22 NOTE — PROGRESS NOTE ADULT - ASSESSMENT
71M w/ PMHx BPH, recent h/o viral diarrhea x 1 week ago, c/o progressive weakness. Found urinary retention and transverse myelitis on MRI. Worsened despite IV steroids, transferred from [facility] for possible plasma exchange. Medicine consulted for fever.     NEURO:  #Bilateral Lower Extremity Weakness  Most likely Transverse myelitis , Received 3 days of steroids prior to transfer to [facility] from [facility] , started on IVIG per primary team on 4/16. Restarted another course of high-dose steroids on 4/19. Plan for PLEX today   - Further investigation and treatment per primary team   - If back spasms continue can consider baclofen if no contraindication from primary team     #Anxiety  Pt exhibiting anxiety preventing sleep while here i/s/o his illness  - Can do 0.25mg PO xanax prn for anxiety    ID:  #UTI  On 4/17 intermittently tachycardic,  o2 sats on room air decreased to 93% on room air from 100% , Fever 100.5. CT chest at [facility] which revealed plate like atelectasis in the Lingula. UA +, s/p exchange of nation catheter ( placed at [facility] due to urinary retention ). CTPE negative.   - Ertapenem x5 days 1g QD. Urine Cx (currently showing ESBL E.coli >100k)    :  #Urine retention  - Bladder scans q6h     GI:  #Constipation  2 BMs since yesterday's enema. No abd pain or tenderness, no N/V.   - Enema PRN        #Elevated Transaminases  Ultrasound RUQ, without gall bladder disease , + for mild hepatic steatosis   - LFTs slightly uptrending   - F/u GI recs     #Small Pancreatic cystic mass   Negative  - GI consulted, planning for EUS and Biopsy      DVT prophylaxis: Lovenox     D/w attending, medicine signing off

## 2022-04-22 NOTE — PROGRESS NOTE ADULT - ATTENDING COMMENTS
I was physically present for the key portions of the evaluation and managemnent (E/M) service provided.  I agree with the above history, physical, and plan which I have reviewed and edited where appropriate, with the exceptions as per my note.    pt reports back pain is better with gabapentin.    neuro exam stable.    AP: cont IVMP, cont plex.

## 2022-04-22 NOTE — PROGRESS NOTE ADULT - SUBJECTIVE AND OBJECTIVE BOX
Physical Medicine and Rehabilitation Progress Note:    Patient is a 71y old  Male who presents with a chief complaint of TM (2022 08:36)      HPI:  71 M with no past medical history, not on any medications, transferred here from [facility] for bilateral lower extremity weakness, with concern for Transverse Myelitis vs [place], for plasma exchange. Patient's story dates back to week and a half ago, when he went to [place] for a vacation, developed nausea, vomiting, diarrhea and weakness, accompanied by abd distension and difficulty urinating for which he went to [facility]. There, he was found to be in urosepsis, complicated by urinary obstruction for which Marie Catheter was placed. On day prior to planned discharge , he started to develop severe paresthesia, numbness, and weakness of bilateral lower extremities. He underwent abdominal imaging and MRI of spine at [facility], and was found to have pancreatic lesion at pancreatic head, and MRI thoracic spine consistent with transverse myelitis. He then was given high dose steroids for three days, which provided minimal improvement of symptoms. As a result, patient transferred to [facility] for possible plasma exchange.   Of note-patient travelled to [place] and [place] in November, for which he received a series of immunizations that he does not recall.     Currently, he is endorsing weakness, tingling, and numbness of his bilateral lower extremities up until the point of his umbilicus. He is denying any other symptoms of his UE, notes no weakness, paresthesias, tingling. Currently only complaint is abdominal pain and constipation.      Relevant imaging/studies from [facility]:  CT AP: Ill defined low density lesion within pancreatic head represent a benign or malignant neoplasm. Measures 2.2 x 2.4 x 3.1 cm which may represent a benign or malignant neoplasm, pancreatic lesion partially compresses the portal vein and superior aspect of SMV. Bowel: Mild diffuse wall thickening of the ascending transverse and upper half of the descending colon which may relate to possible mild non specific colitis.   MRI Thoracic:  Borderline abn signal within the central cord substance throughout entire length of thoracic spine could reflect transverse myelitis.   ESR- 30 CRP- 14.2  CSF total protein 80, CSF PCR negative, others pending (15 Apr 2022 20:38)                            12.4   8.39  )-----------( 187      ( 2022 07:41 )             38.6       04-22    136  |  104  |  29<H>  ----------------------------<  159<H>  4.4   |  22  |  1.04    Ca    8.5      2022 07:41  Phos  4.1       Mg     2.4         TPro  6.0  /  Alb  3.8  /  TBili  0.4  /  DBili  x   /  AST  51<H>  /  ALT  81<H>  /  AlkPhos  53      Vital Signs Last 24 Hrs  T(C): 36.6 (2022 05:12), Max: 36.6 (2022 11:51)  T(F): 97.9 (2022 05:12), Max: 97.9 (2022 11:51)  HR: 64 (2022 05:12) (64 - 76)  BP: 123/73 (2022 05:12) (115/67 - 123/73)  BP(mean): --  RR: 18 (2022 05:12) (18 - 18)  SpO2: 94% (2022 05:12) (94% - 97%)    MEDICATIONS  (STANDING):  chlorhexidine 2% Cloths 1 Application(s) Topical <User Schedule>  cyanocobalamin 1000 MICROGram(s) Oral daily  enoxaparin Injectable 40 milliGRAM(s) SubCutaneous every 24 hours  ertapenem  IVPB      ertapenem  IVPB 1000 milliGRAM(s) IV Intermittent every 24 hours  gabapentin 100 milliGRAM(s) Oral three times a day  lactulose Syrup 10 Gram(s) Oral every 24 hours  melatonin 3 milliGRAM(s) Oral at bedtime  methylPREDNISolone sodium succinate IVPB 1000 milliGRAM(s) IV Intermittent every 24 hours  pantoprazole    Tablet 40 milliGRAM(s) Oral two times a day  polyethylene glycol 3350 17 Gram(s) Oral daily  senna 1 Tablet(s) Oral at bedtime  simethicone 80 milliGRAM(s) Chew two times a day  tamsulosin 0.4 milliGRAM(s) Oral at bedtime    MEDICATIONS  (PRN):  acetaminophen     Tablet .. 650 milliGRAM(s) Oral every 6 hours PRN Temp greater or equal to 38C (100.4F), Mild Pain (1 - 3)  sodium chloride 0.9% lock flush 10 milliLiter(s) IV Push every 1 hour PRN Pre/post blood products, medications, blood draw, and to maintain line patency    Currently Undergoing Physical/ Occupational Therapy at bedside.    PT/OT Functional Status Assessment:   2022      Cognitive/Neuro/Behavioral  Cognitive/Neuro/Behavioral [WDL Definition: Alert; opens eyes spontaneously; arouses to voice or touch; oriented x 4; follows commands; speech spontaneous, logical; purposeful motor response; behavior appropriate to situation]: WDL    Language Assistance  Preferred Language to Address Healthcare Preferred Language to Address Healthcare: English    Peripheral Neurovascular      Venous Thromboembolism Prevention/Management: bleeding precautions maintained;  bleeding risk assessed;  dorsiflexion/plantar flexion performed;  fluids promoted    Skin    Pressure Reduction Techniques: frequent weight shift encouraged;  weight shift assistance provided    Therapeutic Interventions      Bed Mobility  Bed Mobility Training Rolling/Turnin person assist;  nonverbal cues (demo/gestures);  verbal cues;  moderate assist (50% patient effort);  bed rails  Bed Mobility Training Scooting: maximum assist (25% patient effort);  2 person assist;  nonverbal cues (demo/gestures);  verbal cues  Bed Mobility Training Sit-to-Supine: maximum assist (25% patient effort);  2 person assist;  nonverbal cues (demo/gestures);  verbal cues;  bed rails  Bed Mobility Training Supine-to-Sit: maximum assist (25% patient effort);  2 person assist;  nonverbal cues (demo/gestures);  verbal cues;  bed rails  Bed Mobility Training Limitations: decreased ability to use legs for bridging/pushing;  decreased ability to use arms for pushing/pulling;  impaired ability to control trunk for mobility;  decreased strength;  impaired balance;  impaired postural control    Therapeutic Exercise  Therapeutic Exercise Detail: Supine: diaphragmatic breathing exercises with overhead lat pulls x 10 using red theraband.Sitting EOB: functional reaching with BUE in various directions and across midline. Pt able to self-correct when trunk swaying from midline. Pt using protective extension reflexes in lateral directions. Pt performing small weight shifts anteriorly/posteriorly 10 x anterior/posterior weight shifts, 10 x lateral weight shifts (raised on pillows), Pt requiring mod/max A for support.           PM&R Impression: as above    Current Disposition Plan Recommendations:    acute rehab placement

## 2022-04-22 NOTE — PROGRESS NOTE ADULT - ASSESSMENT
70 yo M with no significant PMHx, transferred from [facility] for bilateral lower extremity weakness, with working diagnosis of thoracic transverse myelitis, s/p lumbar puncture at [facility], failed high-dose steroids and expected to have minimal-no response to IVIG per Neurology. Incidentally found ot have a cystic pancreatic head lesion (2.2 x 2.4 x 3.1 cm), suspected IPMN, with partial compression of portal vein and superior aspect of SMV with non-specific colitis of ascending transverse and upper half of descending colon. MRCP (04/16/22) showed a complex cystic pancreatic head lesion, either IPMN or atypical serous cystadenoma, with recommendation for EUS and biopsy sampling. Hematology consulted for initiation of PLEX on 04/20/22 per Neurology.    #) DIAGNOSIS  - Steroid- and IVIG-refractory thoracic transverse myelitis  - Mild hypofibrinogenemia 2/2 PLEX  - Complex cystic pancreatic head mass     #) PLAN  - Per Neurology, planning for 5 sessions of PLEX starting on 04/20/22 via HD catheter (completed 2 of 5), planning for 3nd session of PLEX 04/24/22.   - Give 5 U Cryoprecipitate after PLEX given sustained low fibrinogen level. Recheck fibrinogen and PT/PTT on 04/24/22 in AM prior to 3rd treatment.   - Will need to trend CBC with differential, CMP once daily.  - Follow-up with GI for eventual biopsy of the pancreatic head lesion.   - Maintain active T+S, transfuse if Hb < 7 or if actively bleeding     Discussed with Dr. Anne

## 2022-04-22 NOTE — PROGRESS NOTE ADULT - ASSESSMENT
per Neurology    71 y o  M with no past medical history, not on any medications, transferred here from [facility] for bilateral lower extremity weakness, with concern for Transverse Myelitis    Problem/Plan - 1:  ·  Problem: Lower extremity weakness.   ·  Plan: Hx diarrhea two weeks prior to acute LE weakness, imaging consistent with transverse myelitis. Elevated inflammatory markers. Lumbar tap done at [facility]-for which studies pending. s/p x3d high dose steroids with minimal improvement in symptoms.   - s/p IVIG 0.65g/kg x3 days, HD cath placed 4/19 for PLEX starting 4/20 x5 sessions(heme/onc following)  - MR head and CSpine w/wout - faint T2 hyperintensity in posterior lower cervical cord without definite enhancement   - NMO and anti-MOG antibodies  - Autoimmune myelopathy panel sent 4/20 6pm  - PT undergoing PLEX therapy.    Problem/Plan - 2:  ·  Problem: Pancreatic mass.   ·  Plan: CT AP 2.3 x 2.4 x 3.1 at pancreatic head, as per records, workup initiated at [facility]; however, unsure of results.  - Ca-19-9 and CEA negative at [facility]   - MR abdomen/pelvis w cont - pancreatic mass indeterminate etiology    - f/u GI recs.    Problem/Plan - 3:  ·  Problem: Leukocytosis.   ·  Plan: RESOLVED. Presented with WBC of 16, elevated at [facility] as well. Could be 2/2 infection-history of UTI at [facility] treated with 5 day course of CTX, denying any complaints. No cough, no persistent GI symptoms, no diarrhea, no vomiting. Could be 2/2 reactive process given neurological symptomology. Could also be 2/2 prednisone.   - UA positive, nation exchanged   - CXR at [facility] clear without infiltrates  - Patient currently constipated-unlikely to be GI source.    Problem/Plan - 4:  ·  Problem: Transaminitis.   ·  Plan: Downtrending LFTs, likely 2/2 pancreatic mass versus autoimmune etiology. No significant drinking or drug history. No tenderness on exam, no organomegaly.   - follow malignancy workup as above.  - f/u numerous titers and PCR's ordered per GI.    Problem/Plan - 5:  ·  Problem: Urinary retention.   ·  Plan: Patient initially to [facility] with urosepsis c/b urinary retention, likely 2/2 neurological process. Nation placed at [facility].   -continue with flomax 0.4 QHS   -neuro workup as above  - d/c'd nation 4/20, f/u ToV    #Complicated UTI   s/p CTX  - UClx growing >100K ESBL E. Coli, started Ertapenem 1g q24h x5days (4/20-4/24).    Problem/Plan - 6:  ·  Problem: Preventive measure.   ·  Plan: F-none    E-replete PRN    N-regular diet      DVT-lovenox 40SQ.    Problem/Plan - 7:  ·  Problem: R/O Pulmonary embolism.   ·  Plan: - pt was tachycardia, d-dimer elevated  - CT angio negative for PE.

## 2022-04-22 NOTE — PROGRESS NOTE ADULT - SUBJECTIVE AND OBJECTIVE BOX
MARCIAL ROMANO  71y  Male    Patient is a 71y old  Male who presents with a chief complaint of transverse myelitis (22 Apr 2022 10:24)      INTERVAL HPI/OVERNIGHT EVENTS: ANDREA o/n. Had one BM       T(C): 36.7 (04-22-22 @ 11:47), Max: 36.7 (04-22-22 @ 11:47)  HR: 74 (04-22-22 @ 11:47) (64 - 74)  BP: 107/66 (04-22-22 @ 11:47) (107/66 - 123/73)  RR: 18 (04-22-22 @ 11:47) (18 - 18)  SpO2: 94% (04-22-22 @ 11:47) (94% - 94%)  Wt(kg): --Vital Signs Last 24 Hrs  T(C): 36.7 (22 Apr 2022 11:47), Max: 36.7 (22 Apr 2022 11:47)  T(F): 98 (22 Apr 2022 11:47), Max: 98 (22 Apr 2022 11:47)  HR: 74 (22 Apr 2022 11:47) (64 - 74)  BP: 107/66 (22 Apr 2022 11:47) (107/66 - 123/73)  BP(mean): --  RR: 18 (22 Apr 2022 11:47) (18 - 18)  SpO2: 94% (22 Apr 2022 11:47) (94% - 94%)    PHYSICAL EXAM:  GENERAL: NAD  ENMT: No tonsillar erythema, exudates, or enlargement; Moist mucous membranes, Good dentition, No lesions  NECK: Supple, No JVD, Normal thyroid  NERVOUS SYSTEM:  Alert & Oriented X3   CHEST/LUNG: Clear to auscultation bilaterally; No rales, rhonchi, wheezing, or rubs  HEART: Regular rate and rhythm; No murmurs, rubs, or gallops  ABDOMEN: Soft, Nontender, less distended from previous day, close to baseline; Bowel sounds present  EXTREMITIES: WWP, No clubbing, cyanosis, or edema  Vascular: 2+ peripheral pulses x4      Consultant(s) Notes Reviewed:  [x ] YES  [ ] NO  Care Discussed with Consultants/Other Providers [ x] YES  [ ] NO    LABS:                        12.4   8.39  )-----------( 187      ( 22 Apr 2022 07:41 )             38.6     04-22    136  |  104  |  29<H>  ----------------------------<  159<H>  4.4   |  22  |  1.04    Ca    8.5      22 Apr 2022 07:41  Phos  4.1     04-22  Mg     2.4     04-22    TPro  6.0  /  Alb  3.8  /  TBili  0.4  /  DBili  x   /  AST  51<H>  /  ALT  81<H>  /  AlkPhos  53  04-22      PT/INR - ( 22 Apr 2022 07:41 )   PT: 12.4 sec;   INR: 1.04          PTT - ( 22 Apr 2022 07:41 )  PTT:24.6 sec    CAPILLARY BLOOD GLUCOSE                RADIOLOGY & ADDITIONAL TESTS:    Imaging Personally Reviewed:  [ ] YES  [ ] NO    HEALTH ISSUES - PROBLEM Dx:  Lower extremity weakness    Pancreatic mass    Urinary retention    Preventive measure    Leukocytosis    Transaminitis

## 2022-04-22 NOTE — PROGRESS NOTE ADULT - PROBLEM SELECTOR PLAN 3
RESOLVED. Presented with WBC of 16, elevated at [facility] as well. Could be 2/2 infection-history of UTI at [facility] treated with 5 day course of CTX, denying any complaints. No cough, no persistent GI symptoms, no diarrhea, no vomiting. Could be 2/2 reactive process given neurological symptomology. Could also be 2/2 prednisone.   - UA positive, nation exchanged   - CXR at [facility] clear without infiltrates  - Patient currently constipated-unlikely to be GI source.

## 2022-04-22 NOTE — PROGRESS NOTE ADULT - PROBLEM SELECTOR PLAN 5
Patient initially to [facility] with urosepsis c/b urinary retention, likely 2/2 neurological process. Nation placed at [facility].   -continue with flomax 0.4 QHS   -neuro workup as above  - d/c'd nation 4/20, f/u ToV    #Complicated UTI   s/p CTX  - UClx growing >100K ESBL E. Coli, started Ertapenem 1g q24h x5days Patient initially to [facility] with urosepsis c/b urinary retention, likely 2/2 neurological process. Nation placed at [facility].   -continue with flomax 0.4 QHS   -neuro workup as above  - d/c'd nation 4/20, f/u ToV    #Complicated UTI   s/p CTX  - UClx growing >100K ESBL E. Coli, started Ertapenem 1g q24h x5days (4/20-4/24)

## 2022-04-22 NOTE — PROGRESS NOTE ADULT - SUBJECTIVE AND OBJECTIVE BOX
MARCIAL ROMANO, 71y, Male  MRN-[id number]  Patient is a 71y old  Male who presents with a chief complaint of TM (21 Apr 2022 08:36)      OVERNIGHT EVENTS: naeo     SUBJECTIVE: Patient seen and examined at bedside. Reports some bloating, resolved back spasm. Denies fevers, chills, HA, chest pain, sob, nausea, vomiting, abdominal pain, diarrhea, constipation, dysuria.     12 Point ROS Negative unless noted otherwise above.  -------------------------------------------------------------------------------  VITAL SIGNS:  Vital Signs Last 24 Hrs  T(C): 36.6 (22 Apr 2022 05:12), Max: 36.6 (21 Apr 2022 11:51)  T(F): 97.9 (22 Apr 2022 05:12), Max: 97.9 (21 Apr 2022 11:51)  HR: 64 (22 Apr 2022 05:12) (64 - 76)  BP: 123/73 (22 Apr 2022 05:12) (115/67 - 123/73)  BP(mean): --  RR: 18 (22 Apr 2022 05:12) (18 - 18)  SpO2: 94% (22 Apr 2022 05:12) (94% - 97%)  I&O's Summary    21 Apr 2022 07:01  -  22 Apr 2022 07:00  --------------------------------------------------------  IN: 0 mL / OUT: 1100 mL / NET: -1100 mL        PHYSICAL EXAM:    Constitutional: NAD, comfortable in bed.  HEENT: NC/AT, PERRLA, EOMI, no conjunctival pallor or scleral icterus, MMM  Neck: Supple, no JVD  Respiratory: CTA B/L. No w/r/r.   Cardiovascular: RRR, normal S1 and S2, no m/r/g.   Gastrointestinal: +BS, soft NTND, no guarding or rebound tenderness, no palpable masses   Extremities: wwp; no cyanosis, clubbing or edema.   Vascular: Pulses equal and strong throughout.   Neurological: AAOx3, no CN deficits, sensation intact throughout, b/l LE strength 0/5   Skin: No gross skin abnormalities or rashes    ALLERGIES:  Allergies    No Known Allergies    Intolerances        MEDICATIONS:  MEDICATIONS  (STANDING):  albumin human  5% IVPB 3250 milliLiter(s) IV Intermittent once  chlorhexidine 2% Cloths 1 Application(s) Topical <User Schedule>  cyanocobalamin 1000 MICROGram(s) Oral daily  enoxaparin Injectable 40 milliGRAM(s) SubCutaneous every 24 hours  ertapenem  IVPB      ertapenem  IVPB 1000 milliGRAM(s) IV Intermittent every 24 hours  gabapentin 100 milliGRAM(s) Oral three times a day  lactulose Syrup 10 Gram(s) Oral every 24 hours  melatonin 3 milliGRAM(s) Oral at bedtime  methylPREDNISolone sodium succinate IVPB 1000 milliGRAM(s) IV Intermittent every 24 hours  pantoprazole    Tablet 40 milliGRAM(s) Oral two times a day  polyethylene glycol 3350 17 Gram(s) Oral daily  senna 1 Tablet(s) Oral at bedtime  simethicone 80 milliGRAM(s) Chew two times a day  tamsulosin 0.4 milliGRAM(s) Oral at bedtime    MEDICATIONS  (PRN):  acetaminophen     Tablet .. 650 milliGRAM(s) Oral every 6 hours PRN Temp greater or equal to 38C (100.4F), Mild Pain (1 - 3)  sodium chloride 0.9% lock flush 10 milliLiter(s) IV Push every 1 hour PRN Pre/post blood products, medications, blood draw, and to maintain line patency      -------------------------------------------------------------------------------  LABS:                        12.4   8.39  )-----------( 187      ( 22 Apr 2022 07:41 )             38.6     04-22    136  |  104  |  x   ----------------------------<  159<H>  4.4   |  22  |  1.04    Ca    8.5      22 Apr 2022 07:41  Phos  4.1     04-22  Mg     2.4     04-22    TPro  6.0  /  Alb  3.8  /  TBili  0.4  /  DBili  x   /  AST  51<H>  /  ALT  81<H>  /  AlkPhos  53  04-22    LIVER FUNCTIONS - ( 22 Apr 2022 07:41 )  Alb: 3.8 g/dL / Pro: 6.0 g/dL / ALK PHOS: 53 U/L / ALT: 81 U/L / AST: 51 U/L / GGT: x           PT/INR - ( 22 Apr 2022 07:41 )   PT: 12.4 sec;   INR: 1.04          PTT - ( 22 Apr 2022 07:41 )  PTT:24.6 sec    CAPILLARY BLOOD GLUCOSE              RADIOLOGY & ADDITIONAL TESTS: Reviewed.

## 2022-04-23 LAB
ALBUMIN SERPL ELPH-MCNC: 4 G/DL — SIGNIFICANT CHANGE UP (ref 3.3–5)
ALP SERPL-CCNC: 36 U/L — LOW (ref 40–120)
ALT FLD-CCNC: 55 U/L — HIGH (ref 10–45)
ANION GAP SERPL CALC-SCNC: 8 MMOL/L — SIGNIFICANT CHANGE UP (ref 5–17)
APTT BLD: 29.8 SEC — SIGNIFICANT CHANGE UP (ref 27.5–35.5)
AQP4 H2O CHANNEL AB SERPL IA-ACNC: NEGATIVE — SIGNIFICANT CHANGE UP
AST SERPL-CCNC: 37 U/L — SIGNIFICANT CHANGE UP (ref 10–40)
BASOPHILS # BLD AUTO: 0.01 K/UL — SIGNIFICANT CHANGE UP (ref 0–0.2)
BASOPHILS NFR BLD AUTO: 0.1 % — SIGNIFICANT CHANGE UP (ref 0–2)
BILIRUB SERPL-MCNC: 0.2 MG/DL — SIGNIFICANT CHANGE UP (ref 0.2–1.2)
BUN SERPL-MCNC: 24 MG/DL — HIGH (ref 7–23)
CALCIUM SERPL-MCNC: 8.5 MG/DL — SIGNIFICANT CHANGE UP (ref 8.4–10.5)
CHLORIDE SERPL-SCNC: 104 MMOL/L — SIGNIFICANT CHANGE UP (ref 96–108)
CO2 SERPL-SCNC: 22 MMOL/L — SIGNIFICANT CHANGE UP (ref 22–31)
CREAT SERPL-MCNC: 0.9 MG/DL — SIGNIFICANT CHANGE UP (ref 0.5–1.3)
EGFR: 92 ML/MIN/1.73M2 — SIGNIFICANT CHANGE UP
EOSINOPHIL # BLD AUTO: 0 K/UL — SIGNIFICANT CHANGE UP (ref 0–0.5)
EOSINOPHIL NFR BLD AUTO: 0 % — SIGNIFICANT CHANGE UP (ref 0–6)
FIBRINOGEN PPP-MCNC: <80 MG/DL — CRITICAL LOW (ref 258–438)
GLUCOSE SERPL-MCNC: 178 MG/DL — HIGH (ref 70–99)
HCT VFR BLD CALC: 36.5 % — LOW (ref 39–50)
HDV AB SER-ACNC: NEGATIVE — SIGNIFICANT CHANGE UP
HGB BLD-MCNC: 12 G/DL — LOW (ref 13–17)
IMM GRANULOCYTES NFR BLD AUTO: 1.7 % — HIGH (ref 0–1.5)
INR BLD: 1.21 — HIGH (ref 0.88–1.16)
LYMPHOCYTES # BLD AUTO: 0.97 K/UL — LOW (ref 1–3.3)
LYMPHOCYTES # BLD AUTO: 10.5 % — LOW (ref 13–44)
MAGNESIUM SERPL-MCNC: 2.2 MG/DL — SIGNIFICANT CHANGE UP (ref 1.6–2.6)
MCHC RBC-ENTMCNC: 28.4 PG — SIGNIFICANT CHANGE UP (ref 27–34)
MCHC RBC-ENTMCNC: 32.9 GM/DL — SIGNIFICANT CHANGE UP (ref 32–36)
MCV RBC AUTO: 86.3 FL — SIGNIFICANT CHANGE UP (ref 80–100)
MONOCYTES # BLD AUTO: 0.21 K/UL — SIGNIFICANT CHANGE UP (ref 0–0.9)
MONOCYTES NFR BLD AUTO: 2.3 % — SIGNIFICANT CHANGE UP (ref 2–14)
NEUTROPHILS # BLD AUTO: 7.85 K/UL — HIGH (ref 1.8–7.4)
NEUTROPHILS NFR BLD AUTO: 85.4 % — HIGH (ref 43–77)
NRBC # BLD: 0 /100 WBCS — SIGNIFICANT CHANGE UP (ref 0–0)
PHOSPHATE SERPL-MCNC: 3.5 MG/DL — SIGNIFICANT CHANGE UP (ref 2.5–4.5)
PLATELET # BLD AUTO: 178 K/UL — SIGNIFICANT CHANGE UP (ref 150–400)
POTASSIUM SERPL-MCNC: 4.3 MMOL/L — SIGNIFICANT CHANGE UP (ref 3.5–5.3)
POTASSIUM SERPL-SCNC: 4.3 MMOL/L — SIGNIFICANT CHANGE UP (ref 3.5–5.3)
PROT SERPL-MCNC: 4.9 G/DL — LOW (ref 6–8.3)
PROTHROM AB SERPL-ACNC: 14.4 SEC — HIGH (ref 10.5–13.4)
RBC # BLD: 4.23 M/UL — SIGNIFICANT CHANGE UP (ref 4.2–5.8)
RBC # FLD: 13.5 % — SIGNIFICANT CHANGE UP (ref 10.3–14.5)
SODIUM SERPL-SCNC: 134 MMOL/L — LOW (ref 135–145)
WBC # BLD: 9.2 K/UL — SIGNIFICANT CHANGE UP (ref 3.8–10.5)
WBC # FLD AUTO: 9.2 K/UL — SIGNIFICANT CHANGE UP (ref 3.8–10.5)

## 2022-04-23 PROCEDURE — 99232 SBSQ HOSP IP/OBS MODERATE 35: CPT

## 2022-04-23 RX ORDER — ALBUMIN HUMAN 25 %
3250 VIAL (ML) INTRAVENOUS ONCE
Refills: 0 | Status: COMPLETED | OUTPATIENT
Start: 2022-04-24 | End: 2022-04-24

## 2022-04-23 RX ORDER — CALCIUM GLUCONATE 100 MG/ML
2 VIAL (ML) INTRAVENOUS ONCE
Refills: 0 | Status: COMPLETED | OUTPATIENT
Start: 2022-04-24 | End: 2022-04-24

## 2022-04-23 RX ADMIN — LACTULOSE 10 GRAM(S): 10 SOLUTION ORAL at 11:27

## 2022-04-23 RX ADMIN — TAMSULOSIN HYDROCHLORIDE 0.4 MILLIGRAM(S): 0.4 CAPSULE ORAL at 23:16

## 2022-04-23 RX ADMIN — GABAPENTIN 100 MILLIGRAM(S): 400 CAPSULE ORAL at 13:01

## 2022-04-23 RX ADMIN — SIMETHICONE 80 MILLIGRAM(S): 80 TABLET, CHEWABLE ORAL at 18:17

## 2022-04-23 RX ADMIN — Medication 58 MILLIGRAM(S): at 23:16

## 2022-04-23 RX ADMIN — PANTOPRAZOLE SODIUM 40 MILLIGRAM(S): 20 TABLET, DELAYED RELEASE ORAL at 05:39

## 2022-04-23 RX ADMIN — ERTAPENEM SODIUM 120 MILLIGRAM(S): 1 INJECTION, POWDER, LYOPHILIZED, FOR SOLUTION INTRAMUSCULAR; INTRAVENOUS at 11:27

## 2022-04-23 RX ADMIN — Medication 3 MILLIGRAM(S): at 23:16

## 2022-04-23 RX ADMIN — ENOXAPARIN SODIUM 40 MILLIGRAM(S): 100 INJECTION SUBCUTANEOUS at 23:16

## 2022-04-23 RX ADMIN — PREGABALIN 1000 MICROGRAM(S): 225 CAPSULE ORAL at 11:27

## 2022-04-23 RX ADMIN — CHLORHEXIDINE GLUCONATE 1 APPLICATION(S): 213 SOLUTION TOPICAL at 05:40

## 2022-04-23 RX ADMIN — GABAPENTIN 100 MILLIGRAM(S): 400 CAPSULE ORAL at 05:39

## 2022-04-23 RX ADMIN — SIMETHICONE 80 MILLIGRAM(S): 80 TABLET, CHEWABLE ORAL at 05:39

## 2022-04-23 RX ADMIN — GABAPENTIN 100 MILLIGRAM(S): 400 CAPSULE ORAL at 23:16

## 2022-04-23 RX ADMIN — PANTOPRAZOLE SODIUM 40 MILLIGRAM(S): 20 TABLET, DELAYED RELEASE ORAL at 18:17

## 2022-04-23 NOTE — PROGRESS NOTE ADULT - ATTENDING COMMENTS
I was physically present for the key portions of the evaluation and managemnent (E/M) service provided.  I agree with the above history, physical, and plan which I have reviewed and edited where appropriate, with the exceptions as per my note.    Pt seen and examined.    OCBs positive in CSF. MOG and AQP4 negative in serum.    Likely idiopathic myelitis.    - cont PLEX x at least 5 sessions - may consider 2 extra depending on exam, s/p IVMP x5 followed now by long prednisone taper starting at 60mg.  - await AI myelopathy panel results  - discuss left sacroilliac junction lesion with radiology

## 2022-04-23 NOTE — PROGRESS NOTE ADULT - SUBJECTIVE AND OBJECTIVE BOX
Neurology Progress Note    Interval History:      HPI:  71 M with no past medical history, not on any medications, transferred here from [facility] for bilateral lower extremity weakness, with concern for Transverse Myelitis vs Guillan [place], for plasma exchange. Patient's story dates back to week and a half ago, when he went to [place] for a vacation, developed nausea, vomiting, diarrhea and weakness, accompanied by abd distension and difficulty urinating for which he went to [facility]. There, he was found to be in urosepsis, complicated by urinary obstruction for which Marie Catheter was placed. On day prior to planned discharge 4/12, he started to develop severe paresthesia, numbness, and weakness of bilateral lower extremities. He underwent abdominal imaging and MRI of spine at [facility], and was found to have pancreatic lesion at pancreatic head, and MRI thoracic spine consistent with transverse myelitis. He then was given high dose steroids for three days, which provided minimal improvement of symptoms. As a result, patient transferred to [facility] for possible plasma exchange.   Of note-patient travelled to [place] and [place] in November, for which he received a series of immunizations that he does not recall.     Currently, he is endorsing weakness, tingling, and numbness of his bilateral lower extremities up until the point of his umbilicus. He is denying any other symptoms of his UE, notes no weakness, paresthesias, tingling. Currently only complaint is abdominal pain and constipation.      Relevant imaging/studies from [facility]:  CT AP: Ill defined low density lesion within pancreatic head represent a benign or malignant neoplasm. Measures 2.2 x 2.4 x 3.1 cm which may represent a benign or malignant neoplasm, pancreatic lesion partially compresses the portal vein and superior aspect of SMV. Bowel: Mild diffuse wall thickening of the ascending transverse and upper half of the descending colon which may relate to possible mild non specific colitis.   MRI Thoracic:  Borderline abn signal within the central cord substance throughout entire length of thoracic spine could reflect transverse myelitis.   ESR- 30 CRP- 14.2  CSF total protein 80, CSF PCR negative, others pending (15 Apr 2022 20:38)      PAST MEDICAL & SURGICAL HISTORY:          Medications:  acetaminophen     Tablet .. 650 milliGRAM(s) Oral every 6 hours PRN  chlorhexidine 2% Cloths 1 Application(s) Topical <User Schedule>  cyanocobalamin 1000 MICROGram(s) Oral daily  enoxaparin Injectable 40 milliGRAM(s) SubCutaneous every 24 hours  ertapenem  IVPB      ertapenem  IVPB 1000 milliGRAM(s) IV Intermittent every 24 hours  gabapentin 100 milliGRAM(s) Oral three times a day  lactulose Syrup 10 Gram(s) Oral every 24 hours  melatonin 3 milliGRAM(s) Oral at bedtime  pantoprazole    Tablet 40 milliGRAM(s) Oral two times a day  polyethylene glycol 3350 17 Gram(s) Oral daily  senna 1 Tablet(s) Oral at bedtime  simethicone 80 milliGRAM(s) Chew two times a day  sodium chloride 0.9% lock flush 10 milliLiter(s) IV Push every 1 hour PRN  tamsulosin 0.4 milliGRAM(s) Oral at bedtime      Vital Signs Last 24 Hrs  T(C): 36.4 (23 Apr 2022 21:32), Max: 36.5 (23 Apr 2022 12:30)  T(F): 97.6 (23 Apr 2022 21:32), Max: 97.7 (23 Apr 2022 12:30)  HR: 75 (23 Apr 2022 21:32) (64 - 75)  BP: 138/78 (23 Apr 2022 21:32) (110/60 - 138/78  RR: 18 (23 Apr 2022 21:32) (17 - 18)  SpO2: 95% (23 Apr 2022 21:32) (95% - 100%)      Neurological Exam:   Mental status: Awake, alert and oriented x3. Recent and remote memory intact.  Naming, repetition and comprehension intact.  Attention/concentration intact.  No dysarthria, no aphasia.  Fund of knowledge appropriate.    Cranial nerves: Pupils equally round and reactive to light, visual fields full, no nystagmus, extraocular muscles intact, V1 through V3 intact bilaterally and symmetric, face symmetric, hearing intact to finger rub, palate elevation symmetric, tongue was midline.  Motor: MRC grading 5/5 B/L UE. Able to move LE B/L horizontally, improved since yesterday, still unable to move against gravity, strength in B/L LE 2/5.  strength 5/5. Normal tone and bulk. No abnormal movements.    Sensation: Intact to light touch, proprioception, and pinprick.   Coordination: No dysmetria on finger-to-nose and heel-to-shin.   Reflexes: 2+ in bilateral UE/LE, downgoing toes bilaterally.  Gait: Deferred 2/2 acuity of condition.      Labs:  CBC Full  -  ( 23 Apr 2022 07:41 )  WBC Count : 9.20 K/uL  RBC Count : 4.23 M/uL  Hemoglobin : 12.0 g/dL  Hematocrit : 36.5 %  Platelet Count - Automated : 178 K/uL  Mean Cell Volume : 86.3 fl  Mean Cell Hemoglobin : 28.4 pg  Mean Cell Hemoglobin Concentration : 32.9 gm/dL  Auto Neutrophil # : 7.85 K/uL  Auto Lymphocyte # : 0.97 K/uL  Auto Monocyte # : 0.21 K/uL  Auto Eosinophil # : 0.00 K/uL  Auto Basophil # : 0.01 K/uL  Auto Neutrophil % : 85.4 %  Auto Lymphocyte % : 10.5 %  Auto Monocyte % : 2.3 %  Auto Eosinophil % : 0.0 %  Auto Basophil % : 0.1 %    04-23    134<L>  |  104  |  24<H>  ----------------------------<  178<H>  4.3   |  22  |  0.90    Ca    8.5      23 Apr 2022 07:41  Phos  3.5     04-23  Mg     2.2     04-23    TPro  4.9<L>  /  Alb  4.0  /  TBili  0.2  /  DBili  x   /  AST  37  /  ALT  55<H>  /  AlkPhos  36<L>  04-23    LIVER FUNCTIONS - ( 23 Apr 2022 07:41 )  Alb: 4.0 g/dL / Pro: 4.9 g/dL / ALK PHOS: 36 U/L / ALT: 55 U/L / AST: 37 U/L / GGT: x           PT/INR - ( 23 Apr 2022 07:41 )   PT: 14.4 sec;   INR: 1.21       PTT - ( 23 Apr 2022 07:41 )  PTT:29.8 sec    < from: MR Head w/wo IV Cont (04.16.22 @ 18:30) >  PROCEDURE DATE:  04/16/2022          INTERPRETATION:  Kiersten GRAY MD, have reviewed the images and the report   and agree with the findings.    [facility] RADIOLOGIST PRELIMINARY REPORT      Initial report created on 4/16/2022 8:08:23 PM EDT  PROCEDURE INFORMATION:  Exam: MR Head Without and With Contrast  Exam date and time: 4/16/2022 4:49 PM  Age: 71 years old  Clinical indication: Other: Neuro pathology, evaluate anatomy. New onset   le  weakness    TECHNIQUE:  Imaging protocol: MR of the head without and with intravenous contrast.    COMPARISON:  No relevant prior studies available.    FINDINGS:  Brain: No evidence of abnormal diffusion/ADC signal of the brain   parenchyma.  Minimal hyperintense punctate foci T2 signal involving the white matter  bilaterally.  Cerebral ventricles: Normal. No ventriculomegaly.  Bones/joints: Unremarkable.  Paranasal sinuses: Normal as visualized. No acute sinusitis.  Mastoid air cells: Mild left mastoid fluid.  Orbital cavities: Unremarkable.  Soft tissues: Unremarkable.  Vasculature: Vasculature: Normal vascular enhancement.    IMPRESSION:  1. No evidence of acute large vessel cerebral infarction.  2. Minimal bilateral white matter signal changes most compatible with   cerebral  leukoencephalopathy related to chronic small vessel ischemic disease.  3. Mild left mastoid fluid.    --- End of Report ---      < end of copied text >    < from: MR Cervical Spine w/wo IV Cont (04.16.22 @ 18:30) >  PROCEDURE DATE:  04/16/2022          INTERPRETATION:  IKiersten MD, have reviewed the images and the report   and agree with the findings with the additional modification:      No evidence of abnormal signal changes within the spinal cord. No   evidence of abnormal enhancement.    At the C5/C6 level there is a disc osteophyte complex contacting the   ventral spinal cord, right greater than left. There is bilateral   uncovertebral and facet hypertrophy. There is moderate to severe   bilateral foraminal narrowing.    At the C6/C7 level there is a mild diffuse disc bulge flattening the   ventral thecal sac. This bilateral uncovertebral and facet hypertrophy.   There is mild right and severe left foraminal narrowing. There is no   evidence of spinal        [facility] RADI[facility] PRELIMINARY REPORT      Initial report created on 4/16/2022 8:22:47 PM EDT  PROCEDURE INFORMATION:  Exam: MR Cervical Spine With Contrast  Exam date and time: 4/16/2022 5:17 PM  Age: 71 years old  Clinical indication: Other: Neuro pathology, evaluate anatomy. New onset   le  weakness    TECHNIQUE:  Imaging protocol: Multiplanar magnetic resonance images of the cervical   spine  with contrast.    COMPARISON:  MR BRAIN WITHOUT AND WITH IV CONTRAST 4/16/2022 4:49 PM    FINDINGS:  Vertebrae: Heterogenous signal intensity of the cervical vertebral bodies.  Normal cervical spine alignment with maintained curvature. Multilevel   cervical  spondylosis with mild posterior disc marginal osteophytes at all levels  particularly C5-6, C6-7 levels, mildly effacing the ventral thecal sac.  Multilevel bilateral facet hypertrophy. Mild-moderate bilateral neural  foraminal narrowing at C5-C6 and C6-C7.  Spinal cord: Cervical cord appears normal in signal and caliber.  Soft tissues: Paraspinal soft tissues normal.  Brain: Vasculature: Normal vascular enhancement.  Vertebral arteries: Expected flow voids in the vertebral arteries.    IMPRESSION:  1. No evidence of acute fracture or malalignment.  2. No evidence of abnormal cervical cord enhancement/lesion.  3. Multilevel cervical spondylosis and facet osteoarthrosis as described   above.  4. Possible underlying osteopenia.    --- End of Report ---    < end of copied text >     Neurology Progress Note    HPI:  71 M with no past medical history, not on any medications, transferred here from [facility] for bilateral lower extremity weakness, with concern for Transverse Myelitis vs Guillan [place], for plasma exchange. Patient's story dates back to week and a half ago, when he went to [place] for a vacation, developed nausea, vomiting, diarrhea and weakness, accompanied by abd distension and difficulty urinating for which he went to [facility]. There, he was found to be in urosepsis, complicated by urinary obstruction for which Marie Catheter was placed. On day prior to planned discharge 4/12, he started to develop severe paresthesia, numbness, and weakness of bilateral lower extremities. He underwent abdominal imaging and MRI of spine at [facility], and was found to have pancreatic lesion at pancreatic head, and MRI thoracic spine consistent with transverse myelitis. He then was given high dose steroids for three days, which provided minimal improvement of symptoms. As a result, patient transferred to [facility] for possible plasma exchange.   Of note-patient travelled to [place] and [place] in November, for which he received a series of immunizations that he does not recall.       Interval History:    Patient seen and examined at bedside. There were no acute events overnight. Continues to have ongoing lower extremity weakness, although states it is improving. Denies any other complaints.      PAST MEDICAL & SURGICAL HISTORY:  No significant past medical history  No significant past surgical history      Medications:  acetaminophen     Tablet .. 650 milliGRAM(s) Oral every 6 hours PRN  chlorhexidine 2% Cloths 1 Application(s) Topical <User Schedule>  cyanocobalamin 1000 MICROGram(s) Oral daily  enoxaparin Injectable 40 milliGRAM(s) SubCutaneous every 24 hours  ertapenem  IVPB      ertapenem  IVPB 1000 milliGRAM(s) IV Intermittent every 24 hours  gabapentin 100 milliGRAM(s) Oral three times a day  lactulose Syrup 10 Gram(s) Oral every 24 hours  melatonin 3 milliGRAM(s) Oral at bedtime  pantoprazole    Tablet 40 milliGRAM(s) Oral two times a day  polyethylene glycol 3350 17 Gram(s) Oral daily  senna 1 Tablet(s) Oral at bedtime  simethicone 80 milliGRAM(s) Chew two times a day  sodium chloride 0.9% lock flush 10 milliLiter(s) IV Push every 1 hour PRN  tamsulosin 0.4 milliGRAM(s) Oral at bedtime      Vital Signs Last 24 Hrs  T(C): 36.4 (23 Apr 2022 21:32), Max: 36.5 (23 Apr 2022 12:30)  T(F): 97.6 (23 Apr 2022 21:32), Max: 97.7 (23 Apr 2022 12:30)  HR: 75 (23 Apr 2022 21:32) (64 - 75)  BP: 138/78 (23 Apr 2022 21:32) (110/60 - 138/78  RR: 18 (23 Apr 2022 21:32) (17 - 18)  SpO2: 95% (23 Apr 2022 21:32) (95% - 100%)      Neurological Exam:   Mental status: Awake, alert and oriented x3. Recent and remote memory intact.  Naming, repetition and comprehension intact.  Attention/concentration intact.  No dysarthria, no aphasia.  Fund of knowledge appropriate.    Cranial nerves: Pupils equally round and reactive to light, visual fields full, no nystagmus, extraocular muscles intact, V1 through V3 intact bilaterally and symmetric, face symmetric, hearing intact to finger rub, palate elevation symmetric, tongue was midline.  Motor: MRC grading 5/5 B/L UE. Able to move LE B/L horizontally, improved since yesterday, still unable to move against gravity, strength in B/L LE 2/5.  strength 5/5. Normal tone and bulk. No abnormal movements.    Sensation: Intact to light touch, proprioception, and pinprick.   Coordination: No dysmetria on finger-to-nose and heel-to-shin.   Reflexes: 2+ in bilateral UE/LE, downgoing toes bilaterally.  Gait: Deferred 2/2 acuity of condition.      Labs:  CBC Full  -  ( 23 Apr 2022 07:41 )  WBC Count : 9.20 K/uL  RBC Count : 4.23 M/uL  Hemoglobin : 12.0 g/dL  Hematocrit : 36.5 %  Platelet Count - Automated : 178 K/uL  Mean Cell Volume : 86.3 fl  Mean Cell Hemoglobin : 28.4 pg  Mean Cell Hemoglobin Concentration : 32.9 gm/dL  Auto Neutrophil # : 7.85 K/uL  Auto Lymphocyte # : 0.97 K/uL  Auto Monocyte # : 0.21 K/uL  Auto Eosinophil # : 0.00 K/uL  Auto Basophil # : 0.01 K/uL  Auto Neutrophil % : 85.4 %  Auto Lymphocyte % : 10.5 %  Auto Monocyte % : 2.3 %  Auto Eosinophil % : 0.0 %  Auto Basophil % : 0.1 %    04-23    134<L>  |  104  |  24<H>  ----------------------------<  178<H>  4.3   |  22  |  0.90    Ca    8.5      23 Apr 2022 07:41  Phos  3.5     04-23  Mg     2.2     04-23    TPro  4.9<L>  /  Alb  4.0  /  TBili  0.2  /  DBili  x   /  AST  37  /  ALT  55<H>  /  AlkPhos  36<L>  04-23    LIVER FUNCTIONS - ( 23 Apr 2022 07:41 )  Alb: 4.0 g/dL / Pro: 4.9 g/dL / ALK PHOS: 36 U/L / ALT: 55 U/L / AST: 37 U/L / GGT: x           PT/INR - ( 23 Apr 2022 07:41 )   PT: 14.4 sec;   INR: 1.21       PTT - ( 23 Apr 2022 07:41 )  PTT:29.8 sec    < from: MR Head w/wo IV Cont (04.16.22 @ 18:30) >  PROCEDURE DATE:  04/16/2022          INTERPRETATION:  IKiersten MD, have reviewed the images and the report   and agree with the findings.    [facility] PRELIMINARY REPORT      Initial report created on 4/16/2022 8:08:23 PM EDT  PROCEDURE INFORMATION:  Exam: MR Head Without and With Contrast  Exam date and time: 4/16/2022 4:49 PM  Age: 71 years old  Clinical indication: Other: Neuro pathology, evaluate anatomy. New onset   le  weakness    TECHNIQUE:  Imaging protocol: MR of the head without and with intravenous contrast.    COMPARISON:  No relevant prior studies available.    FINDINGS:  Brain: No evidence of abnormal diffusion/ADC signal of the brain   parenchyma.  Minimal hyperintense punctate foci T2 signal involving the white matter  bilaterally.  Cerebral ventricles: Normal. No ventriculomegaly.  Bones/joints: Unremarkable.  Paranasal sinuses: Normal as visualized. No acute sinusitis.  Mastoid air cells: Mild left mastoid fluid.  Orbital cavities: Unremarkable.  Soft tissues: Unremarkable.  Vasculature: Vasculature: Normal vascular enhancement.    IMPRESSION:  1. No evidence of acute large vessel cerebral infarction.  2. Minimal bilateral white matter signal changes most compatible with   cerebral  leukoencephalopathy related to chronic small vessel ischemic disease.  3. Mild left mastoid fluid.    --- End of Report ---      < end of copied text >    < from: MR Cervical Spine w/wo IV Cont (04.16.22 @ 18:30) >  PROCEDURE DATE:  04/16/2022          INTERPRETATION:  IKiersten MD, have reviewed the images and the report   and agree with the findings with the additional modification:      No evidence of abnormal signal changes within the spinal cord. No   evidence of abnormal enhancement.    At the C5/C6 level there is a disc osteophyte complex contacting the   ventral spinal cord, right greater than left. There is bilateral   uncovertebral and facet hypertrophy. There is moderate to severe   bilateral foraminal narrowing.    At the C6/C7 level there is a mild diffuse disc bulge flattening the   ventral thecal sac. This bilateral uncovertebral and facet hypertrophy.   There is mild right and severe left foraminal narrowing. There is no   evidence of spinal        [facility] RAD[facility] PRELIMINARY REPORT      Initial report created on 4/16/2022 8:22:47 PM EDT  PROCEDURE INFORMATION:  Exam: MR Cervical Spine With Contrast  Exam date and time: 4/16/2022 5:17 PM  Age: 71 years old  Clinical indication: Other: Neuro pathology, evaluate anatomy. New onset   le  weakness    TECHNIQUE:  Imaging protocol: Multiplanar magnetic resonance images of the cervical   spine  with contrast.    COMPARISON:  MR BRAIN WITHOUT AND WITH IV CONTRAST 4/16/2022 4:49 PM    FINDINGS:  Vertebrae: Heterogenous signal intensity of the cervical vertebral bodies.  Normal cervical spine alignment with maintained curvature. Multilevel   cervical  spondylosis with mild posterior disc marginal osteophytes at all levels  particularly C5-6, C6-7 levels, mildly effacing the ventral thecal sac.  Multilevel bilateral facet hypertrophy. Mild-moderate bilateral neural  foraminal narrowing at C5-C6 and C6-C7.  Spinal cord: Cervical cord appears normal in signal and caliber.  Soft tissues: Paraspinal soft tissues normal.  Brain: Vasculature: Normal vascular enhancement.  Vertebral arteries: Expected flow voids in the vertebral arteries.    IMPRESSION:  1. No evidence of acute fracture or malalignment.  2. No evidence of abnormal cervical cord enhancement/lesion.  3. Multilevel cervical spondylosis and facet osteoarthrosis as described   above.  4. Possible underlying osteopenia.    --- End of Report ---    < end of copied text >

## 2022-04-23 NOTE — PROGRESS NOTE ADULT - SUBJECTIVE AND OBJECTIVE BOX
LENGTH OF HOSPITAL STAY: 8d    SUBJECTIVE: Tolerated 2nd session of PLEX well. Fibrinogen low today, getting 5 units cryoprecipitate.     HISTORY OF PRESENTING ILLNESS:   71 M with no past medical history, not on any medications, transferred here from [facility] for bilateral lower extremity weakness, with concern for Transverse Myelitis vs Guillan [place], for plasma exchange. Patient's story dates back to week and a half ago, when he went to [place] for a vacation, developed nausea, vomiting, diarrhea and weakness, accompanied by abd distension and difficulty urinating for which he went to [facility]. There, he was found to be in urosepsis, complicated by urinary obstruction for which Marie Catheter was placed. On day prior to planned discharge 4/12, he started to develop severe paresthesia, numbness, and weakness of bilateral lower extremities. He underwent abdominal imaging and MRI of spine at [facility], and was found to have pancreatic lesion at pancreatic head, and MRI thoracic spine consistent with transverse myelitis. He then was given high dose steroids for three days, which provided minimal improvement of symptoms. As a result, patient transferred to [facility] for possible plasma exchange.   Of note-patient travelled to [place] and [place] in November, for which he received a series of immunizations that he does not recall.     Currently, he is endorsing weakness, tingling, and numbness of his bilateral lower extremities up until the point of his umbilicus. He is denying any other symptoms of his UE, notes no weakness, paresthesias, tingling. Currently only complaint is abdominal pain and constipation.      Relevant imaging/studies from [facility]:  CT AP: Ill defined low density lesion within pancreatic head represent a benign or malignant neoplasm. Measures 2.2 x 2.4 x 3.1 cm which may represent a benign or malignant neoplasm, pancreatic lesion partially compresses the portal vein and superior aspect of SMV. Bowel: Mild diffuse wall thickening of the ascending transverse and upper half of the descending colon which may relate to possible mild non specific colitis.   MRI Thoracic:  Borderline abn signal within the central cord substance throughout entire length of thoracic spine could reflect transverse myelitis.   ESR- 30 CRP- 14.2  CSF total protein 80, CSF PCR negative, others pending (15 Apr 2022 20:38)    PAST MEDICAL & SURGICAL HISTORY:  Negative    ALLERGIES:  No Known Allergies    MEDICATIONS:  MEDICATIONS  (STANDING):  chlorhexidine 2% Cloths 1 Application(s) Topical <User Schedule>  cyanocobalamin 1000 MICROGram(s) Oral daily  enoxaparin Injectable 40 milliGRAM(s) SubCutaneous every 24 hours  ertapenem  IVPB      ertapenem  IVPB 1000 milliGRAM(s) IV Intermittent every 24 hours  gabapentin 100 milliGRAM(s) Oral three times a day  lactulose Syrup 10 Gram(s) Oral every 24 hours  melatonin 3 milliGRAM(s) Oral at bedtime  methylPREDNISolone sodium succinate IVPB 1000 milliGRAM(s) IV Intermittent every 24 hours  pantoprazole    Tablet 40 milliGRAM(s) Oral two times a day  polyethylene glycol 3350 17 Gram(s) Oral daily  senna 1 Tablet(s) Oral at bedtime  simethicone 80 milliGRAM(s) Chew two times a day  tamsulosin 0.4 milliGRAM(s) Oral at bedtime    MEDICATIONS  (PRN):  acetaminophen     Tablet .. 650 milliGRAM(s) Oral every 6 hours PRN Temp greater or equal to 38C (100.4F), Mild Pain (1 - 3)  sodium chloride 0.9% lock flush 10 milliLiter(s) IV Push every 1 hour PRN Pre/post blood products, medications, blood draw, and to maintain line patency    VITALS:   Vital Signs Last 24 Hrs  T(C): 36.4 (23 Apr 2022 05:59), Max: 37 (22 Apr 2022 20:47)  T(F): 97.5 (23 Apr 2022 05:59), Max: 98.6 (22 Apr 2022 20:47)  HR: 64 (23 Apr 2022 05:59) (64 - 74)  BP: 125/76 (23 Apr 2022 05:59) (107/66 - 125/76)  RR: 17 (23 Apr 2022 05:59) (17 - 18)  SpO2: 100% (23 Apr 2022 05:59) (94% - 100%)    LABS:                          12.0   9.20  )-----------( 178      ( 23 Apr 2022 07:41 )             36.5     04-23    134<L>  |  104  |  24<H>  ----------------------------<  178<H>  4.3   |  22  |  0.90    Ca    8.5      23 Apr 2022 07:41  Phos  3.5     04-23  Mg     2.2     04-23    TPro  4.9<L>  /  Alb  4.0  /  TBili  0.2  /  DBili  x   /  AST  37  /  ALT  55<H>  /  AlkPhos  36<L>  04-23      PT/INR - ( 23 Apr 2022 07:41 )   PT: 14.4 sec;   INR: 1.21        PTT - ( 23 Apr 2022 07:41 )  PTT:29.8 sec    RADIOLOGY:  Reviewed    PHYSICAL EXAM:  GEN: No acute distress  HEENT: NCAT  LUNGS: Clear to auscultation bilaterally   HEART: S1/S2 present. RRR.   ABD: Soft, non-tender, non-distended. Bowel sounds present  EXT: No pitting edema, 0/5 LLE/RLE  NEURO: AAOX3

## 2022-04-23 NOTE — PROGRESS NOTE ADULT - ASSESSMENT
This is a 71 M with no past medical history, not on any medications, transferred here from [facility] for bilateral lower extremity weakness, with concern for Transverse Myelitis. Neurology consulted for further evaluation of LE weakness. Likely transverse myelitis with hx diarrhea two weeks prior to acute LE weakness, imaging consistent with transverse myelitis.Elevated inflammatory markers. Lumbar tap done at [facility]-for which studies pending. S/p high dose steroids. S/p IVIG 0.65g/kg x3 days, HD cath placed 4/19 for PLEX treatments and LE responding, improving at this time.    Plan:  - MR head and C-Spine w/ and w/o - faint T2 hyperintensity in posterior lower cervical cord without definite enhancement   - NMO and anti-MOG antibodies negative  - Autoimmune myelopathy panel sent, f/u  - Continue PLEX therapy started 4/20, to be done every other day. Will get an additional treatment tomorrow 4/24  - Completed course of IV steroids, IVIG treatments  - Continue 60 mg po prednisone daily  - Continue Gabapentin 100 mg TID   190

## 2022-04-23 NOTE — PROGRESS NOTE ADULT - ASSESSMENT
70 yo M with no significant PMHx, transferred from [facility] for bilateral lower extremity weakness, with working diagnosis of thoracic transverse myelitis, s/p lumbar puncture at [facility], failed high-dose steroids and expected to have minimal-no response to IVIG per Neurology. Incidentally found ot have a cystic pancreatic head lesion (2.2 x 2.4 x 3.1 cm), suspected IPMN, with partial compression of portal vein and superior aspect of SMV with non-specific colitis of ascending transverse and upper half of descending colon. MRCP (04/16/22) showed a complex cystic pancreatic head lesion, either IPMN or atypical serous cystadenoma, with recommendation for EUS and biopsy sampling. Hematology consulted for initiation of PLEX on 04/20/22 per Neurology.    #) DIAGNOSIS  - Steroid- and IVIG-refractory thoracic transverse myelitis  - Mild hypofibrinogenemia 2/2 PLEX  - Complex cystic pancreatic head mass     #) PLAN  - Per Neurology, planning for 5 sessions of PLEX starting on 04/20/22 via HD catheter (completed 2 of 5), planning for 3nd session of PLEX 04/24/22.   - Give 5 U Cryoprecipitate after PLEX given sustained low fibrinogen level. Give additional 5 units cryoprecipitate on 4/23/22 for low fibrinogen.   - Recheck fibrinogen and PT/PTT on 04/24/22 in AM prior to 3rd treatment.   - Will need to trend CBC with differential, CMP once daily.  - Follow-up with GI for eventual biopsy of the pancreatic head lesion.   - Maintain active T+S, transfuse if Hb < 7 or if actively bleeding     Discussed with Dr. Anne

## 2022-04-24 LAB
ALBUMIN SERPL ELPH-MCNC: 3.9 G/DL — SIGNIFICANT CHANGE UP (ref 3.3–5)
ALP SERPL-CCNC: 45 U/L — SIGNIFICANT CHANGE UP (ref 40–120)
ALT FLD-CCNC: 74 U/L — HIGH (ref 10–45)
ANION GAP SERPL CALC-SCNC: 6 MMOL/L — SIGNIFICANT CHANGE UP (ref 5–17)
APTT BLD: 26 SEC — LOW (ref 27.5–35.5)
AST SERPL-CCNC: 43 U/L — HIGH (ref 10–40)
BASOPHILS # BLD AUTO: 0.01 K/UL — SIGNIFICANT CHANGE UP (ref 0–0.2)
BASOPHILS NFR BLD AUTO: 0.1 % — SIGNIFICANT CHANGE UP (ref 0–2)
BILIRUB SERPL-MCNC: 0.3 MG/DL — SIGNIFICANT CHANGE UP (ref 0.2–1.2)
BUN SERPL-MCNC: 19 MG/DL — SIGNIFICANT CHANGE UP (ref 7–23)
CALCIUM SERPL-MCNC: 8.4 MG/DL — SIGNIFICANT CHANGE UP (ref 8.4–10.5)
CHLORIDE SERPL-SCNC: 105 MMOL/L — SIGNIFICANT CHANGE UP (ref 96–108)
CO2 SERPL-SCNC: 27 MMOL/L — SIGNIFICANT CHANGE UP (ref 22–31)
CREAT SERPL-MCNC: 1.04 MG/DL — SIGNIFICANT CHANGE UP (ref 0.5–1.3)
EGFR: 77 ML/MIN/1.73M2 — SIGNIFICANT CHANGE UP
EOSINOPHIL # BLD AUTO: 0 K/UL — SIGNIFICANT CHANGE UP (ref 0–0.5)
EOSINOPHIL NFR BLD AUTO: 0 % — SIGNIFICANT CHANGE UP (ref 0–6)
FIBRINOGEN PPP-MCNC: 102 MG/DL — LOW (ref 258–438)
GLUCOSE SERPL-MCNC: 149 MG/DL — HIGH (ref 70–99)
HCT VFR BLD CALC: 37.2 % — LOW (ref 39–50)
HGB BLD-MCNC: 12.2 G/DL — LOW (ref 13–17)
IMM GRANULOCYTES NFR BLD AUTO: 2.4 % — HIGH (ref 0–1.5)
INR BLD: 1.07 — SIGNIFICANT CHANGE UP (ref 0.88–1.16)
LYMPHOCYTES # BLD AUTO: 1.16 K/UL — SIGNIFICANT CHANGE UP (ref 1–3.3)
LYMPHOCYTES # BLD AUTO: 9.8 % — LOW (ref 13–44)
MCHC RBC-ENTMCNC: 28.6 PG — SIGNIFICANT CHANGE UP (ref 27–34)
MCHC RBC-ENTMCNC: 32.8 GM/DL — SIGNIFICANT CHANGE UP (ref 32–36)
MCV RBC AUTO: 87.3 FL — SIGNIFICANT CHANGE UP (ref 80–100)
MONOCYTES # BLD AUTO: 0.28 K/UL — SIGNIFICANT CHANGE UP (ref 0–0.9)
MONOCYTES NFR BLD AUTO: 2.4 % — SIGNIFICANT CHANGE UP (ref 2–14)
NEUTROPHILS # BLD AUTO: 10.08 K/UL — HIGH (ref 1.8–7.4)
NEUTROPHILS NFR BLD AUTO: 85.3 % — HIGH (ref 43–77)
NRBC # BLD: 0 /100 WBCS — SIGNIFICANT CHANGE UP (ref 0–0)
PLATELET # BLD AUTO: 183 K/UL — SIGNIFICANT CHANGE UP (ref 150–400)
POTASSIUM SERPL-MCNC: 4.6 MMOL/L — SIGNIFICANT CHANGE UP (ref 3.5–5.3)
POTASSIUM SERPL-SCNC: 4.6 MMOL/L — SIGNIFICANT CHANGE UP (ref 3.5–5.3)
PROT SERPL-MCNC: 5 G/DL — LOW (ref 6–8.3)
PROTHROM AB SERPL-ACNC: 12.7 SEC — SIGNIFICANT CHANGE UP (ref 10.5–13.4)
RBC # BLD: 4.26 M/UL — SIGNIFICANT CHANGE UP (ref 4.2–5.8)
RBC # FLD: 13.5 % — SIGNIFICANT CHANGE UP (ref 10.3–14.5)
SODIUM SERPL-SCNC: 138 MMOL/L — SIGNIFICANT CHANGE UP (ref 135–145)
WBC # BLD: 11.81 K/UL — HIGH (ref 3.8–10.5)
WBC # FLD AUTO: 11.81 K/UL — HIGH (ref 3.8–10.5)

## 2022-04-24 PROCEDURE — 99233 SBSQ HOSP IP/OBS HIGH 50: CPT

## 2022-04-24 RX ADMIN — ENOXAPARIN SODIUM 40 MILLIGRAM(S): 100 INJECTION SUBCUTANEOUS at 22:18

## 2022-04-24 RX ADMIN — PREGABALIN 1000 MICROGRAM(S): 225 CAPSULE ORAL at 11:05

## 2022-04-24 RX ADMIN — Medication 60 MILLIGRAM(S): at 05:33

## 2022-04-24 RX ADMIN — GABAPENTIN 100 MILLIGRAM(S): 400 CAPSULE ORAL at 13:09

## 2022-04-24 RX ADMIN — SIMETHICONE 80 MILLIGRAM(S): 80 TABLET, CHEWABLE ORAL at 17:33

## 2022-04-24 RX ADMIN — TAMSULOSIN HYDROCHLORIDE 0.4 MILLIGRAM(S): 0.4 CAPSULE ORAL at 22:18

## 2022-04-24 RX ADMIN — ERTAPENEM SODIUM 120 MILLIGRAM(S): 1 INJECTION, POWDER, LYOPHILIZED, FOR SOLUTION INTRAMUSCULAR; INTRAVENOUS at 11:05

## 2022-04-24 RX ADMIN — Medication 1625 MILLILITER(S): at 16:04

## 2022-04-24 RX ADMIN — PANTOPRAZOLE SODIUM 40 MILLIGRAM(S): 20 TABLET, DELAYED RELEASE ORAL at 05:33

## 2022-04-24 RX ADMIN — Medication 200 GRAM(S): at 11:10

## 2022-04-24 RX ADMIN — GABAPENTIN 100 MILLIGRAM(S): 400 CAPSULE ORAL at 22:18

## 2022-04-24 RX ADMIN — LACTULOSE 10 GRAM(S): 10 SOLUTION ORAL at 10:14

## 2022-04-24 RX ADMIN — PANTOPRAZOLE SODIUM 40 MILLIGRAM(S): 20 TABLET, DELAYED RELEASE ORAL at 17:33

## 2022-04-24 RX ADMIN — POLYETHYLENE GLYCOL 3350 17 GRAM(S): 17 POWDER, FOR SOLUTION ORAL at 17:33

## 2022-04-24 RX ADMIN — Medication 3 MILLIGRAM(S): at 22:18

## 2022-04-24 RX ADMIN — GABAPENTIN 100 MILLIGRAM(S): 400 CAPSULE ORAL at 05:33

## 2022-04-24 RX ADMIN — CHLORHEXIDINE GLUCONATE 1 APPLICATION(S): 213 SOLUTION TOPICAL at 05:33

## 2022-04-24 RX ADMIN — Medication 600 UNIT(S): at 11:10

## 2022-04-24 RX ADMIN — SIMETHICONE 80 MILLIGRAM(S): 80 TABLET, CHEWABLE ORAL at 05:32

## 2022-04-24 NOTE — PROGRESS NOTE ADULT - TIME BILLING
evaluation, coordination of care, counseling

## 2022-04-24 NOTE — PROGRESS NOTE ADULT - ASSESSMENT
71 M with no past medical history, not on any medications, transferred here from [facility] for bilateral lower extremity weakness, with concern for Transverse Myelitis

## 2022-04-24 NOTE — PROGRESS NOTE ADULT - PROBLEM SELECTOR PROBLEM 7
R/O Pulmonary embolism
No

## 2022-04-24 NOTE — PROGRESS NOTE ADULT - ASSESSMENT
70 yo M with no significant PMHx, transferred from [facility] for bilateral lower extremity weakness, with working diagnosis of thoracic transverse myelitis, s/p lumbar puncture at [facility], failed high-dose steroids and expected to have minimal-no response to IVIG per Neurology. Incidentally found ot have a cystic pancreatic head lesion (2.2 x 2.4 x 3.1 cm), suspected IPMN, with partial compression of portal vein and superior aspect of SMV with non-specific colitis of ascending transverse and upper half of descending colon. MRCP (04/16/22) showed a complex cystic pancreatic head lesion, either IPMN or atypical serous cystadenoma, with recommendation for EUS and biopsy sampling. Hematology consulted for initiation of PLEX on 04/20/22 per Neurology.    #) DIAGNOSIS  - Steroid- and IVIG-refractory thoracic transverse myelitis  - Mild hypofibrinogenemia 2/2 PLEX  - Complex cystic pancreatic head mass     #) PLAN  - Per Neurology, planning for 5 sessions of PLEX starting on 04/20/22 via HD catheter (completed 3 of 5), planning for 4th session of PLEX 04/26/22.   - Give 5 U Cryoprecipitate before PLEX and 10 U cryo after PLEX today for low fibrinogen.   - Recheck fibrinogen and PT/PTT on 04/26/22 in AM prior to 4th treatment.   - Will need to trend CBC with differential, CMP once daily.  - Follow-up with GI for eventual biopsy of the pancreatic head lesion.   - Maintain active T+S, transfuse if Hb < 7 or if actively bleeding     Discussed with Dr. Anne

## 2022-04-24 NOTE — PROGRESS NOTE ADULT - PROBLEM SELECTOR PLAN 3
RESOLVED. Presented with WBC of 16, elevated at [facility] as well. Could be 2/2 infection-history of UTI at [facility] treated with 5 day course of CTX, denying any complaints. No cough, no persistent GI symptoms, no diarrhea, no vomiting. Could be 2/2 reactive process given neurological symptomology. Could also be 2/2 prednisone.   - UA positive, nation exchanged   - CXR at [facility] clear without infiltrates  - Patient currently constipated-unlikely to be GI source.

## 2022-04-24 NOTE — PROGRESS NOTE ADULT - PROBLEM SELECTOR PLAN 2
CT AP 2.3 x 2.4 x 3.1 at pancreatic head, as per records, workup initiated at [facility]; however, unsure of results.  - Ca-19-9 and CEA negative at [facility]   - MR abdomen/pelvis w cont - pancreatic mass indeterminate etiology    - f/u GI recs

## 2022-04-24 NOTE — PROGRESS NOTE ADULT - PROBLEM SELECTOR PLAN 1
Hx diarrhea two weeks prior to acute LE weakness, imaging consistent with transverse myelitis. Elevated inflammatory markers. Lumbar tap done at [facility]-for which studies pending. s/p x3d high dose steroids with minimal improvement in symptoms.   - s/p IVIG 0.65g/kg x3 days, HD cath placed 4/19 for PLEX starting 4/20 x5 sessions(heme/onc following)  - MR head and CSpine w/wout - faint T2 hyperintensity in posterior lower cervical cord without definite enhancement   - NMO and anti-MOG antibodies  - Autoimmune myelopathy panel sent 4/20 6pm  - PT undergoing PLEX therapy started 4/20, to be done every other day

## 2022-04-24 NOTE — PROGRESS NOTE ADULT - SUBJECTIVE AND OBJECTIVE BOX
SUBJECTIVE/OVERNIGHT EVENTS: No acute overnight events. Pt seen in AM at bedside, resting comfortably in bed, and does not appear to be in any acute distress.    VITAL SIGNS:  Vital Signs Last 24 Hrs  T(C): 36.6 (24 Apr 2022 12:15), Max: 36.8 (24 Apr 2022 05:45)  T(F): 97.9 (24 Apr 2022 12:15), Max: 98.2 (24 Apr 2022 05:45)  HR: 70 (24 Apr 2022 12:15) (65 - 75)  BP: 132/74 (24 Apr 2022 12:15) (129/75 - 138/78)  BP(mean): --  RR: 18 (24 Apr 2022 12:15) (18 - 18)  SpO2: 97% (24 Apr 2022 12:15) (95% - 97%)    PHYSICAL EXAM:  Constitutional: NAD, comfortable in bed.  HEENT: NC/AT, PERRLA, EOMI, no conjunctival pallor or scleral icterus, MMM  Neck: Supple, no JVD  Respiratory: CTA B/L. No w/r/r.   Cardiovascular: RRR, normal S1 and S2, no m/r/g.   Gastrointestinal: +BS, soft NTND, no guarding or rebound tenderness, no palpable masses   Extremities: wwp; no cyanosis, clubbing or edema.   Vascular: Pulses equal and strong throughout.   Neurological: AAOx3, no CN deficits, sensation intact throughout, b/l LE strength 2/5   Skin: No gross skin abnormalities or rashes    MEDICATIONS:  MEDICATIONS  (STANDING):  albumin human  5% IVPB 3250 milliLiter(s) IV Intermittent once  chlorhexidine 2% Cloths 1 Application(s) Topical <User Schedule>  cyanocobalamin 1000 MICROGram(s) Oral daily  enoxaparin Injectable 40 milliGRAM(s) SubCutaneous every 24 hours  gabapentin 100 milliGRAM(s) Oral three times a day  lactulose Syrup 10 Gram(s) Oral every 24 hours  melatonin 3 milliGRAM(s) Oral at bedtime  pantoprazole    Tablet 40 milliGRAM(s) Oral two times a day  polyethylene glycol 3350 17 Gram(s) Oral daily  predniSONE   Tablet 60 milliGRAM(s) Oral every 24 hours  senna 1 Tablet(s) Oral at bedtime  simethicone 80 milliGRAM(s) Chew two times a day  tamsulosin 0.4 milliGRAM(s) Oral at bedtime    MEDICATIONS  (PRN):  acetaminophen     Tablet .. 650 milliGRAM(s) Oral every 6 hours PRN Temp greater or equal to 38C (100.4F), Mild Pain (1 - 3)  sodium chloride 0.9% lock flush 10 milliLiter(s) IV Push every 1 hour PRN Pre/post blood products, medications, blood draw, and to maintain line patency      ALLERGIES:  Allergies    No Known Allergies    Intolerances        LABS:                        12.2   11.81 )-----------( 183      ( 24 Apr 2022 06:29 )             37.2     04-24    138  |  105  |  19  ----------------------------<  149<H>  4.6   |  27  |  1.04    Ca    8.4      24 Apr 2022 06:29  Phos  3.5     04-23  Mg     2.2     04-23    TPro  5.0<L>  /  Alb  3.9  /  TBili  0.3  /  DBili  x   /  AST  43<H>  /  ALT  74<H>  /  AlkPhos  45  04-24    PT/INR - ( 24 Apr 2022 06:29 )   PT: 12.7 sec;   INR: 1.07          PTT - ( 24 Apr 2022 06:29 )  PTT:26.0 sec    RADIOLOGY & ADDITIONAL TESTS: Reviewed.

## 2022-04-24 NOTE — PROGRESS NOTE ADULT - SUBJECTIVE AND OBJECTIVE BOX
LENGTH OF HOSPITAL STAY: 9d    SUBJECTIVE: B/L LE strength improving.     HISTORY OF PRESENTING ILLNESS:   71 M with no past medical history, not on any medications, transferred here from [facility] for bilateral lower extremity weakness, with concern for Transverse Myelitis vs Guillan [place], for plasma exchange. Patient's story dates back to week and a half ago, when he went to [place] for a vacation, developed nausea, vomiting, diarrhea and weakness, accompanied by abd distension and difficulty urinating for which he went to [facility]. There, he was found to be in urosepsis, complicated by urinary obstruction for which Marie Catheter was placed. On day prior to planned discharge 4/12, he started to develop severe paresthesia, numbness, and weakness of bilateral lower extremities. He underwent abdominal imaging and MRI of spine at [facility], and was found to have pancreatic lesion at pancreatic head, and MRI thoracic spine consistent with transverse myelitis. He then was given high dose steroids for three days, which provided minimal improvement of symptoms. As a result, patient transferred to [facility] for possible plasma exchange.   Of note-patient travelled to [place] and [place] in November, for which he received a series of immunizations that he does not recall.     Currently, he is endorsing weakness, tingling, and numbness of his bilateral lower extremities up until the point of his umbilicus. He is denying any other symptoms of his UE, notes no weakness, paresthesias, tingling. Currently only complaint is abdominal pain and constipation.      Relevant imaging/studies from [facility]:  CT AP: Ill defined low density lesion within pancreatic head represent a benign or malignant neoplasm. Measures 2.2 x 2.4 x 3.1 cm which may represent a benign or malignant neoplasm, pancreatic lesion partially compresses the portal vein and superior aspect of SMV. Bowel: Mild diffuse wall thickening of the ascending transverse and upper half of the descending colon which may relate to possible mild non specific colitis.   MRI Thoracic:  Borderline abn signal within the central cord substance throughout entire length of thoracic spine could reflect transverse myelitis.   ESR- 30 CRP- 14.2  CSF total protein 80, CSF PCR negative, others pending (15 Apr 2022 20:38)    PAST MEDICAL & SURGICAL HISTORY:  Negative    ALLERGIES:  No Known Allergies    MEDICATIONS:  MEDICATIONS  (STANDING):  chlorhexidine 2% Cloths 1 Application(s) Topical <User Schedule>  cyanocobalamin 1000 MICROGram(s) Oral daily  enoxaparin Injectable 40 milliGRAM(s) SubCutaneous every 24 hours  gabapentin 100 milliGRAM(s) Oral three times a day  lactulose Syrup 10 Gram(s) Oral every 24 hours  melatonin 3 milliGRAM(s) Oral at bedtime  pantoprazole    Tablet 40 milliGRAM(s) Oral two times a day  polyethylene glycol 3350 17 Gram(s) Oral daily  predniSONE   Tablet 60 milliGRAM(s) Oral every 24 hours  senna 1 Tablet(s) Oral at bedtime  simethicone 80 milliGRAM(s) Chew two times a day  tamsulosin 0.4 milliGRAM(s) Oral at bedtime    MEDICATIONS  (PRN):  acetaminophen     Tablet .. 650 milliGRAM(s) Oral every 6 hours PRN Temp greater or equal to 38C (100.4F), Mild Pain (1 - 3)  sodium chloride 0.9% lock flush 10 milliLiter(s) IV Push every 1 hour PRN Pre/post blood products, medications, blood draw, and to maintain line patency        VITALS:   Vital Signs Last 24 Hrs  T(C): 36.6 (24 Apr 2022 12:15), Max: 36.8 (24 Apr 2022 05:45)  T(F): 97.9 (24 Apr 2022 12:15), Max: 98.2 (24 Apr 2022 05:45)  HR: 70 (24 Apr 2022 12:15) (65 - 75)  BP: 132/74 (24 Apr 2022 12:15) (129/75 - 138/78)  RR: 18 (24 Apr 2022 12:15) (18 - 18)  SpO2: 97% (24 Apr 2022 12:15) (95% - 97%)    LABS:                                     12.2   11.81 )-----------( 183      ( 24 Apr 2022 06:29 )             37.2       04-24    138  |  105  |  19  ----------------------------<  149<H>  4.6   |  27  |  1.04    Ca    8.4      24 Apr 2022 06:29  Phos  3.5     04-23  Mg     2.2     04-23    TPro  5.0<L>  /  Alb  3.9  /  TBili  0.3  /  DBili  x   /  AST  43<H>  /  ALT  74<H>  /  AlkPhos  45  04-24    PT/INR - ( 24 Apr 2022 06:29 )   PT: 12.7 sec;   INR: 1.07        PTT - ( 24 Apr 2022 06:29 )  PTT:26.0 sec    RADIOLOGY:  Reviewed    PHYSICAL EXAM:  GEN: No acute distress  HEENT: NCAT  LUNGS: Clear to auscultation bilaterally   HEART: S1/S2 present. RRR.   ABD: Soft, non-tender, non-distended. Bowel sounds present  EXT: No pitting edema, 1/5 LLE/RLE  NEURO: AAOX3

## 2022-04-24 NOTE — PROGRESS NOTE ADULT - ATTENDING COMMENTS
I was physically present for the key portions of the evaluation and managemnent (E/M) service provided.  I agree with the above history, physical, and plan which I have reviewed and edited where appropriate, with the exceptions as per my note.    continued improvement.    2/5 in LEs.    cont plex, cont prednisone.

## 2022-04-24 NOTE — PROGRESS NOTE ADULT - PROBLEM SELECTOR PLAN 5
Patient initially to [facility] with urosepsis c/b urinary retention, likely 2/2 neurological process. Nation placed at [facility].   -continue with flomax 0.4 QHS   -neuro workup as above  - d/c'd nation 4/20, f/u ToV    #Complicated UTI   s/p CTX  - UClx growing >100K ESBL E. Coli, started Ertapenem 1g q24h x5days (4/20-4/24)

## 2022-04-25 LAB
ALBUMIN SERPL ELPH-MCNC: 3.8 G/DL — SIGNIFICANT CHANGE UP (ref 3.3–5)
ALP SERPL-CCNC: 25 U/L — LOW (ref 40–120)
ALT FLD-CCNC: 47 U/L — HIGH (ref 10–45)
ANION GAP SERPL CALC-SCNC: 8 MMOL/L — SIGNIFICANT CHANGE UP (ref 5–17)
ANISOCYTOSIS BLD QL: SLIGHT — SIGNIFICANT CHANGE UP
ANNOTATION COMMENT IMP: SIGNIFICANT CHANGE UP
AQP4 H2O CHANNEL AB SERPL IA-ACNC: NEGATIVE — SIGNIFICANT CHANGE UP
AST SERPL-CCNC: 31 U/L — SIGNIFICANT CHANGE UP (ref 10–40)
BASOPHILS # BLD AUTO: 0 K/UL — SIGNIFICANT CHANGE UP (ref 0–0.2)
BASOPHILS NFR BLD AUTO: 0 % — SIGNIFICANT CHANGE UP (ref 0–2)
BILIRUB SERPL-MCNC: 0.3 MG/DL — SIGNIFICANT CHANGE UP (ref 0.2–1.2)
BUN SERPL-MCNC: 23 MG/DL — SIGNIFICANT CHANGE UP (ref 7–23)
BURR CELLS BLD QL SMEAR: PRESENT — SIGNIFICANT CHANGE UP
CALCIUM SERPL-MCNC: 8.3 MG/DL — LOW (ref 8.4–10.5)
CHLORIDE SERPL-SCNC: 106 MMOL/L — SIGNIFICANT CHANGE UP (ref 96–108)
CO2 SERPL-SCNC: 22 MMOL/L — SIGNIFICANT CHANGE UP (ref 22–31)
CREAT SERPL-MCNC: 0.98 MG/DL — SIGNIFICANT CHANGE UP (ref 0.5–1.3)
EGFR: 83 ML/MIN/1.73M2 — SIGNIFICANT CHANGE UP
EOSINOPHIL # BLD AUTO: 0 K/UL — SIGNIFICANT CHANGE UP (ref 0–0.5)
EOSINOPHIL NFR BLD AUTO: 0 % — SIGNIFICANT CHANGE UP (ref 0–6)
FIBRINOGEN PPP-MCNC: 109 MG/DL — LOW (ref 258–438)
GIANT PLATELETS BLD QL SMEAR: PRESENT — SIGNIFICANT CHANGE UP
GLUCOSE SERPL-MCNC: 128 MG/DL — HIGH (ref 70–99)
HCT VFR BLD CALC: 35.2 % — LOW (ref 39–50)
HEV RNA SERPL NAA+PROBE-ACNC: SIGNIFICANT CHANGE UP IU/ML
HEV RNA SERPL NAA+PROBE-LOG IU: <3.3 — SIGNIFICANT CHANGE UP
HGB BLD-MCNC: 11.4 G/DL — LOW (ref 13–17)
INR BLD: 1.1 — SIGNIFICANT CHANGE UP (ref 0.88–1.16)
LYMPHOCYTES # BLD AUTO: 1.51 K/UL — SIGNIFICANT CHANGE UP (ref 1–3.3)
LYMPHOCYTES # BLD AUTO: 10.5 % — LOW (ref 13–44)
MACROCYTES BLD QL: SLIGHT — SIGNIFICANT CHANGE UP
MAGNESIUM SERPL-MCNC: 2.2 MG/DL — SIGNIFICANT CHANGE UP (ref 1.6–2.6)
MANUAL SMEAR VERIFICATION: SIGNIFICANT CHANGE UP
MCHC RBC-ENTMCNC: 28.9 PG — SIGNIFICANT CHANGE UP (ref 27–34)
MCHC RBC-ENTMCNC: 32.4 GM/DL — SIGNIFICANT CHANGE UP (ref 32–36)
MCV RBC AUTO: 89.1 FL — SIGNIFICANT CHANGE UP (ref 80–100)
MICROCYTES BLD QL: SLIGHT — SIGNIFICANT CHANGE UP
MONOCYTES # BLD AUTO: 0.26 K/UL — SIGNIFICANT CHANGE UP (ref 0–0.9)
MONOCYTES NFR BLD AUTO: 1.8 % — LOW (ref 2–14)
NEUTROPHILS # BLD AUTO: 12.64 K/UL — HIGH (ref 1.8–7.4)
NEUTROPHILS NFR BLD AUTO: 87.7 % — HIGH (ref 43–77)
OVALOCYTES BLD QL SMEAR: SLIGHT — SIGNIFICANT CHANGE UP
PHOSPHATE SERPL-MCNC: 3.4 MG/DL — SIGNIFICANT CHANGE UP (ref 2.5–4.5)
PLAT MORPH BLD: ABNORMAL
PLATELET # BLD AUTO: 203 K/UL — SIGNIFICANT CHANGE UP (ref 150–400)
POIKILOCYTOSIS BLD QL AUTO: SLIGHT — SIGNIFICANT CHANGE UP
POLYCHROMASIA BLD QL SMEAR: SLIGHT — SIGNIFICANT CHANGE UP
POTASSIUM SERPL-MCNC: 4.1 MMOL/L — SIGNIFICANT CHANGE UP (ref 3.5–5.3)
POTASSIUM SERPL-SCNC: 4.1 MMOL/L — SIGNIFICANT CHANGE UP (ref 3.5–5.3)
PROT SERPL-MCNC: 4.6 G/DL — LOW (ref 6–8.3)
PROTHROM AB SERPL-ACNC: 13.1 SEC — SIGNIFICANT CHANGE UP (ref 10.5–13.4)
RBC # BLD: 3.95 M/UL — LOW (ref 4.2–5.8)
RBC # FLD: 13.6 % — SIGNIFICANT CHANGE UP (ref 10.3–14.5)
RBC BLD AUTO: ABNORMAL
SODIUM SERPL-SCNC: 136 MMOL/L — SIGNIFICANT CHANGE UP (ref 135–145)
SPECIMEN SOURCE: SIGNIFICANT CHANGE UP
SPHEROCYTES BLD QL SMEAR: SLIGHT — SIGNIFICANT CHANGE UP
WBC # BLD: 14.41 K/UL — HIGH (ref 3.8–10.5)
WBC # FLD AUTO: 14.41 K/UL — HIGH (ref 3.8–10.5)

## 2022-04-25 PROCEDURE — 99233 SBSQ HOSP IP/OBS HIGH 50: CPT

## 2022-04-25 RX ORDER — ALBUMIN HUMAN 25 %
3250 VIAL (ML) INTRAVENOUS ONCE
Refills: 0 | Status: COMPLETED | OUTPATIENT
Start: 2022-04-26 | End: 2022-04-26

## 2022-04-25 RX ORDER — CALCIUM GLUCONATE 100 MG/ML
2 VIAL (ML) INTRAVENOUS ONCE
Refills: 0 | Status: COMPLETED | OUTPATIENT
Start: 2022-04-26 | End: 2022-04-26

## 2022-04-25 RX ADMIN — PREGABALIN 1000 MICROGRAM(S): 225 CAPSULE ORAL at 12:08

## 2022-04-25 RX ADMIN — SIMETHICONE 80 MILLIGRAM(S): 80 TABLET, CHEWABLE ORAL at 17:22

## 2022-04-25 RX ADMIN — PANTOPRAZOLE SODIUM 40 MILLIGRAM(S): 20 TABLET, DELAYED RELEASE ORAL at 05:33

## 2022-04-25 RX ADMIN — Medication 60 MILLIGRAM(S): at 05:33

## 2022-04-25 RX ADMIN — GABAPENTIN 100 MILLIGRAM(S): 400 CAPSULE ORAL at 15:30

## 2022-04-25 RX ADMIN — ENOXAPARIN SODIUM 40 MILLIGRAM(S): 100 INJECTION SUBCUTANEOUS at 21:34

## 2022-04-25 RX ADMIN — CHLORHEXIDINE GLUCONATE 1 APPLICATION(S): 213 SOLUTION TOPICAL at 05:33

## 2022-04-25 RX ADMIN — TAMSULOSIN HYDROCHLORIDE 0.4 MILLIGRAM(S): 0.4 CAPSULE ORAL at 21:33

## 2022-04-25 RX ADMIN — GABAPENTIN 100 MILLIGRAM(S): 400 CAPSULE ORAL at 21:34

## 2022-04-25 RX ADMIN — Medication 3 MILLIGRAM(S): at 21:34

## 2022-04-25 RX ADMIN — SENNA PLUS 1 TABLET(S): 8.6 TABLET ORAL at 21:33

## 2022-04-25 RX ADMIN — GABAPENTIN 100 MILLIGRAM(S): 400 CAPSULE ORAL at 05:34

## 2022-04-25 RX ADMIN — POLYETHYLENE GLYCOL 3350 17 GRAM(S): 17 POWDER, FOR SOLUTION ORAL at 12:08

## 2022-04-25 RX ADMIN — SIMETHICONE 80 MILLIGRAM(S): 80 TABLET, CHEWABLE ORAL at 05:33

## 2022-04-25 RX ADMIN — PANTOPRAZOLE SODIUM 40 MILLIGRAM(S): 20 TABLET, DELAYED RELEASE ORAL at 17:21

## 2022-04-25 NOTE — PROGRESS NOTE ADULT - ASSESSMENT
70 yo M with no significant PMHx, transferred from [facility] for bilateral lower extremity weakness, with working diagnosis of thoracic transverse myelitis, s/p lumbar puncture at [facility], failed high-dose steroids and expected to have minimal-no response to IVIG per Neurology. Incidentally found ot have a cystic pancreatic head lesion (2.2 x 2.4 x 3.1 cm), suspected IPMN, with partial compression of portal vein and superior aspect of SMV with non-specific colitis of ascending transverse and upper half of descending colon. MRCP (04/16/22) showed a complex cystic pancreatic head lesion, either IPMN or atypical serous cystadenoma, with recommendation for EUS and biopsy sampling. Hematology consulted for initiation of PLEX on 04/20/22 per Neurology.    #) DIAGNOSIS  - Steroid- and IVIG-refractory thoracic transverse myelitis  - Mild hypofibrinogenemia 2/2 PLEX  - Complex cystic pancreatic head mass     #) PLAN  - Per Neurology, planning for 5 sessions of PLEX starting on 04/20/22 via HD catheter (completed 3 of 5), planning for 4th session of PLEX 04/26/22.   - Recheck fibrinogen and PT/PTT on 04/26/22 in AM prior to 4th treatment.   - Will need to trend CBC with differential, CMP once daily.  - Follow-up with GI for eventual biopsy of the pancreatic head lesion.   - Maintain active T+S, transfuse if Hb < 7 or if actively bleeding     Discussed with Dr. Anne

## 2022-04-25 NOTE — PROGRESS NOTE ADULT - PROBLEM SELECTOR PLAN 3
RESOLVED. Presented with WBC of 16, elevated at [facility] as well. Could be 2/2 infection-history of UTI at [facility] treated with 5 day course of CTX, denying any complaints. No cough, no persistent GI symptoms, no diarrhea, no vomiting. Could be 2/2 reactive process given neurological symptomology. Could also be 2/2 prednisone.   - UA positive, nation exchanged   - CXR at [facility] clear without infiltrates  - Patient currently constipated-unlikely to be GI source.

## 2022-04-25 NOTE — PROGRESS NOTE ADULT - ASSESSMENT
per Neurology    71 y o M with no past medical history, not on any medications, transferred here from [facility] for bilateral lower extremity weakness, with concern for Transverse Myelitis    Problem/Plan - 1:  ·  Problem: Lower extremity weakness.   ·  Plan: Hx diarrhea two weeks prior to acute LE weakness, imaging consistent with transverse myelitis. Elevated inflammatory markers. Lumbar tap done at [facility]-for which studies pending. s/p x3d high dose steroids with minimal improvement in symptoms.   - s/p IVIG 0.65g/kg x3 days, HD cath placed 4/19 for PLEX starting 4/20 x5 sessions(heme/onc following)  - MR head and CSpine w/wout - faint T2 hyperintensity in posterior lower cervical cord without definite enhancement   - NMO and anti-MOG antibodies  - Autoimmune myelopathy panel sent 4/20 6pm  - PT undergoing PLEX therapy started 4/20, to be done every other day.    Problem/Plan - 2:  ·  Problem: Pancreatic mass.   ·  Plan: CT AP 2.3 x 2.4 x 3.1 at pancreatic head, as per records, workup initiated at [facility]; however, unsure of results.  - Ca-19-9 and CEA negative at [facility]   - MR abdomen/pelvis w cont - pancreatic mass indeterminate etiology    - f/u GI recs.    Problem/Plan - 3:  ·  Problem: Leukocytosis.   ·  Plan: RESOLVED. Presented with WBC of 16, elevated at [facility] as well. Could be 2/2 infection-history of UTI at [facility] treated with 5 day course of CTX, denying any complaints. No cough, no persistent GI symptoms, no diarrhea, no vomiting. Could be 2/2 reactive process given neurological symptomology. Could also be 2/2 prednisone.   - UA positive, nation exchanged   - CXR at [facility] clear without infiltrates  - Patient currently constipated-unlikely to be GI source.    Problem/Plan - 4:  ·  Problem: Transaminitis.   ·  Plan: Downtrending LFTs, likely 2/2 pancreatic mass versus autoimmune etiology. No significant drinking or drug history. No tenderness on exam, no organomegaly.   - follow malignancy workup as above.  - f/u numerous titers and PCR's ordered per GI.    Problem/Plan - 5:  ·  Problem: Urinary retention.   ·  Plan: Patient initially to [facility] with urosepsis c/b urinary retention, likely 2/2 neurological process. Nation placed at [facility].   -continue with flomax 0.4 QHS   -neuro workup as above  - d/c'd nation 4/20, f/u ToV    #Complicated UTI   s/p CTX  - UClx growing >100K ESBL E. Coli, started Ertapenem 1g q24h x5days (4/20-4/24).    Problem/Plan - 6:  ·  Problem: Preventive measure.   ·  Plan: F-none    E-replete PRN    N-regular diet      DVT-lovenox 40SQ.    Problem/Plan - 7:  ·  Problem: R/O Pulmonary embolism.   ·  Plan: - pt was tachycardia, d-dimer elevated  - CT angio negative for PE.

## 2022-04-25 NOTE — PROGRESS NOTE ADULT - SUBJECTIVE AND OBJECTIVE BOX
Physical Medicine and Rehabilitation Progress Note:    Patient is a 71y old  Male who presents with a chief complaint of TM (22 Apr 2022 13:09)      HPI:  71 M with no past medical history, not on any medications, transferred here from [facility] for bilateral lower extremity weakness, with concern for Transverse Myelitis vs Guillan [place], for plasma exchange. Patient's story dates back to week and a half ago, when he went to [place] for a vacation, developed nausea, vomiting, diarrhea and weakness, accompanied by abd distension and difficulty urinating for which he went to [facility]. There, he was found to be in urosepsis, complicated by urinary obstruction for which Marie Catheter was placed. On day prior to planned discharge 4/12, he started to develop severe paresthesia, numbness, and weakness of bilateral lower extremities. He underwent abdominal imaging and MRI of spine at [facility], and was found to have pancreatic lesion at pancreatic head, and MRI thoracic spine consistent with transverse myelitis. He then was given high dose steroids for three days, which provided minimal improvement of symptoms. As a result, patient transferred to [facility] for possible plasma exchange.   Of note-patient travelled to [place] and [place] in November, for which he received a series of immunizations that he does not recall.     Currently, he is endorsing weakness, tingling, and numbness of his bilateral lower extremities up until the point of his umbilicus. He is denying any other symptoms of his UE, notes no weakness, paresthesias, tingling. Currently only complaint is abdominal pain and constipation.      Relevant imaging/studies from [facility]:  CT AP: Ill defined low density lesion within pancreatic head represent a benign or malignant neoplasm. Measures 2.2 x 2.4 x 3.1 cm which may represent a benign or malignant neoplasm, pancreatic lesion partially compresses the portal vein and superior aspect of SMV. Bowel: Mild diffuse wall thickening of the ascending transverse and upper half of the descending colon which may relate to possible mild non specific colitis.   MRI Thoracic:  Borderline abn signal within the central cord substance throughout entire length of thoracic spine could reflect transverse myelitis.   ESR- 30 CRP- 14.2  CSF total protein 80, CSF PCR negative, others pending (15 Apr 2022 20:38)                            11.4   14.41 )-----------( 203      ( 25 Apr 2022 06:24 )             35.2       04-25    136  |  106  |  23  ----------------------------<  128<H>  4.1   |  22  |  0.98    Ca    8.3<L>      25 Apr 2022 06:24  Phos  3.4     04-25  Mg     2.2     04-25    TPro  4.6<L>  /  Alb  3.8  /  TBili  0.3  /  DBili  x   /  AST  31  /  ALT  47<H>  /  AlkPhos  25<L>  04-25    Vital Signs Last 24 Hrs  T(C): 36.7 (25 Apr 2022 05:54), Max: 36.7 (25 Apr 2022 05:54)  T(F): 98.1 (25 Apr 2022 05:54), Max: 98.1 (25 Apr 2022 05:54)  HR: 61 (25 Apr 2022 05:54) (61 - 75)  BP: 114/65 (25 Apr 2022 05:54) (114/65 - 125/63)  BP(mean): --  RR: 18 (25 Apr 2022 05:54) (18 - 18)  SpO2: 95% (25 Apr 2022 05:54) (94% - 95%)    MEDICATIONS  (STANDING):  chlorhexidine 2% Cloths 1 Application(s) Topical <User Schedule>  cyanocobalamin 1000 MICROGram(s) Oral daily  enoxaparin Injectable 40 milliGRAM(s) SubCutaneous every 24 hours  gabapentin 100 milliGRAM(s) Oral three times a day  lactulose Syrup 10 Gram(s) Oral every 24 hours  melatonin 3 milliGRAM(s) Oral at bedtime  pantoprazole    Tablet 40 milliGRAM(s) Oral two times a day  polyethylene glycol 3350 17 Gram(s) Oral daily  predniSONE   Tablet 60 milliGRAM(s) Oral every 24 hours  senna 1 Tablet(s) Oral at bedtime  simethicone 80 milliGRAM(s) Chew two times a day  tamsulosin 0.4 milliGRAM(s) Oral at bedtime    MEDICATIONS  (PRN):  acetaminophen     Tablet .. 650 milliGRAM(s) Oral every 6 hours PRN Temp greater or equal to 38C (100.4F), Mild Pain (1 - 3)  sodium chloride 0.9% lock flush 10 milliLiter(s) IV Push every 1 hour PRN Pre/post blood products, medications, blood draw, and to maintain line patency    Currently Undergoing Physical/ Occupational Therapy at bedside.    PT/OT Functional Status Assessment:       Cognitive/Neuro/Behavioral  Cognitive/Neuro/Behavioral [WDL Definition: Alert; opens eyes spontaneously; arouses to voice or touch; oriented x 4; follows commands; speech spontaneous, logical; purposeful motor response; behavior appropriate to situation]: WDL    Language Assistance  Preferred Language to Address Healthcare Preferred Language to Address Healthcare: English    Therapeutic Interventions      Bed Mobility  Bed Mobility Training Rolling/Turning: minimum assist (75% patient effort);  2 person assist;  verbal cues;  nonverbal cues (demo/gestures)  Bed Mobility Training Scooting: moderate assist (50% patient effort);  2 person assist;  verbal cues;  nonverbal cues (demo/gestures)  Bed Mobility Training Sit-to-Supine: maximum assist (25% patient effort);  2 person assist;  verbal cues;  nonverbal cues (demo/gestures)  Bed Mobility Training Supine-to-Sit: maximum assist (25% patient effort);  2 person assist;  verbal cues;  nonverbal cues (demo/gestures)  Bed Mobility Training Limitations: decreased strength;  impaired balance;  decreased ability to use legs for bridging/pushing;  impaired ability to control trunk for mobility;  decreased flexibility;  impaired motor control;  impaired postural control    Therapeutic Exercise  Therapeutic Exercise Detail: Pt tolerated sitting EOB ~20min performing R/L Lateral leaning with weight bearing on forearm and trunk flexion/extension [id number] requiring Min-ModAx2 to return to midline. BLE AAROM [id number] with increased activation demo throughout (2+/5 sitting EOB against gravity)     Upper Body Dressing Training  Upper Body Dressing Training Assistance: minimum assist (75% patient effort);  2 person assist;  verbal cues;  nonverbal cues (demo/gestures);  don gown sitting EOB;  decreased strength;  impaired balance;  impaired motor control;  impaired postural control          PM&R Impression: as above    Current Disposition Plan Recommendations:    acute rehab placement

## 2022-04-25 NOTE — PROGRESS NOTE ADULT - PROBLEM SELECTOR PLAN 5
Patient initially to [facility] with urosepsis c/b urinary retention, likely 2/2 neurological process. Nation placed at [facility].   -continue with flomax 0.4 QHS   -neuro workup as above  - patient nation'ed on 4/22 due to retention     #Complicated UTI   s/p CTX  - UClx growing >100K ESBL E. Coli, started Ertapenem 1g q24h x5days (4/20-4/24)

## 2022-04-25 NOTE — PROGRESS NOTE ADULT - SUBJECTIVE AND OBJECTIVE BOX
MARCIAL ROMANO, 71y, Male  MRN-[id number]  Patient is a 71y old  Male who presents with a chief complaint of transverse myelitis (25 Apr 2022 13:25)      OVERNIGHT EVENTS: naeo     SUBJECTIVE:    12 Point ROS Negative unless noted otherwise above.  -------------------------------------------------------------------------------  VITAL SIGNS:  Vital Signs Last 24 Hrs  T(C): 36.7 (25 Apr 2022 05:54), Max: 36.7 (25 Apr 2022 05:54)  T(F): 98.1 (25 Apr 2022 05:54), Max: 98.1 (25 Apr 2022 05:54)  HR: 61 (25 Apr 2022 05:54) (61 - 75)  BP: 114/65 (25 Apr 2022 05:54) (114/65 - 125/63)  BP(mean): --  RR: 18 (25 Apr 2022 05:54) (18 - 18)  SpO2: 95% (25 Apr 2022 05:54) (94% - 95%)  I&O's Summary    24 Apr 2022 07:01  -  25 Apr 2022 07:00  --------------------------------------------------------  IN: 0 mL / OUT: 800 mL / NET: -800 mL        PHYSICAL EXAM:    General: NAD, well developed  HEENT: NC/AT; EOMI, PERRLA, anicteric sclera; moist mucosal membranes.  Neck: supple, trachea midline  Cardiovascular: RRR, +S1/S2; NO M/R/G  Respiratory: CTA B/L; no W/R/R  Gastrointestinal: soft, NT/ND; +BSx4  Extremities: WWP; no edema or cyanosis  Vascular: 2+ radial, DP/PT pulses B/L  Neurological: AAOx3; normal UE strength; improving LE strength; decreased sensation in LE     ALLERGIES:  Allergies    No Known Allergies    Intolerances        MEDICATIONS:  MEDICATIONS  (STANDING):  chlorhexidine 2% Cloths 1 Application(s) Topical <User Schedule>  cyanocobalamin 1000 MICROGram(s) Oral daily  enoxaparin Injectable 40 milliGRAM(s) SubCutaneous every 24 hours  gabapentin 100 milliGRAM(s) Oral three times a day  lactulose Syrup 10 Gram(s) Oral every 24 hours  melatonin 3 milliGRAM(s) Oral at bedtime  pantoprazole    Tablet 40 milliGRAM(s) Oral two times a day  polyethylene glycol 3350 17 Gram(s) Oral daily  predniSONE   Tablet 60 milliGRAM(s) Oral every 24 hours  senna 1 Tablet(s) Oral at bedtime  simethicone 80 milliGRAM(s) Chew two times a day  tamsulosin 0.4 milliGRAM(s) Oral at bedtime    MEDICATIONS  (PRN):  acetaminophen     Tablet .. 650 milliGRAM(s) Oral every 6 hours PRN Temp greater or equal to 38C (100.4F), Mild Pain (1 - 3)  sodium chloride 0.9% lock flush 10 milliLiter(s) IV Push every 1 hour PRN Pre/post blood products, medications, blood draw, and to maintain line patency      -------------------------------------------------------------------------------  LABS:                        11.4   14.41 )-----------( 203      ( 25 Apr 2022 06:24 )             35.2     04-25    136  |  106  |  23  ----------------------------<  128<H>  4.1   |  22  |  0.98    Ca    8.3<L>      25 Apr 2022 06:24  Phos  3.4     04-25  Mg     2.2     04-25    TPro  4.6<L>  /  Alb  3.8  /  TBili  0.3  /  DBili  x   /  AST  31  /  ALT  47<H>  /  AlkPhos  25<L>  04-25    LIVER FUNCTIONS - ( 25 Apr 2022 06:24 )  Alb: 3.8 g/dL / Pro: 4.6 g/dL / ALK PHOS: 25 U/L / ALT: 47 U/L / AST: 31 U/L / GGT: x           PT/INR - ( 25 Apr 2022 06:24 )   PT: 13.1 sec;   INR: 1.10          PTT - ( 24 Apr 2022 06:29 )  PTT:26.0 sec    CAPILLARY BLOOD GLUCOSE          COVID-19 PCR: NotDetec (21 Apr 2022 21:40)      RADIOLOGY & ADDITIONAL TESTS: Reviewed.   Interval history: Leg weakness continues to improve gradually  Sensation also improving especially on left  No more back spasms and tingling improved after starting gabapentin       -------------------------------------------------------------------------------  VITAL SIGNS:  Vital Signs Last 24 Hrs  T(C): 36.7 (25 Apr 2022 05:54), Max: 36.7 (25 Apr 2022 05:54)  T(F): 98.1 (25 Apr 2022 05:54), Max: 98.1 (25 Apr 2022 05:54)  HR: 61 (25 Apr 2022 05:54) (61 - 75)  BP: 114/65 (25 Apr 2022 05:54) (114/65 - 125/63)  BP(mean): --  RR: 18 (25 Apr 2022 05:54) (18 - 18)  SpO2: 95% (25 Apr 2022 05:54) (94% - 95%)  I&O's Summary    24 Apr 2022 07:01  -  25 Apr 2022 07:00  --------------------------------------------------------  IN: 0 mL / OUT: 800 mL / NET: -800 mL        PHYSICAL EXAM:    General: NAD, well developed  HEENT: NC/AT; EOMI, PERRLA, anicteric sclera; moist mucosal membranes.  Neck: supple, trachea midline  Cardiovascular: RRR, +S1/S2; NO M/R/G  Respiratory: CTA B/L; no W/R/R  Gastrointestinal: soft, NT/ND; +BSx4  Extremities: WWP; no edema or cyanosis  Vascular: 2+ radial, DP/PT pulses B/L  Neurological: AAOx3; normal UE strength, no dysmetria on finger to nose; LE 2/5; decreased sensation in LEs, R>L    ALLERGIES:  Allergies    No Known Allergies    Intolerances        MEDICATIONS:  MEDICATIONS  (STANDING):  chlorhexidine 2% Cloths 1 Application(s) Topical <User Schedule>  cyanocobalamin 1000 MICROGram(s) Oral daily  enoxaparin Injectable 40 milliGRAM(s) SubCutaneous every 24 hours  gabapentin 100 milliGRAM(s) Oral three times a day  lactulose Syrup 10 Gram(s) Oral every 24 hours  melatonin 3 milliGRAM(s) Oral at bedtime  pantoprazole    Tablet 40 milliGRAM(s) Oral two times a day  polyethylene glycol 3350 17 Gram(s) Oral daily  predniSONE   Tablet 60 milliGRAM(s) Oral every 24 hours  senna 1 Tablet(s) Oral at bedtime  simethicone 80 milliGRAM(s) Chew two times a day  tamsulosin 0.4 milliGRAM(s) Oral at bedtime    MEDICATIONS  (PRN):  acetaminophen     Tablet .. 650 milliGRAM(s) Oral every 6 hours PRN Temp greater or equal to 38C (100.4F), Mild Pain (1 - 3)  sodium chloride 0.9% lock flush 10 milliLiter(s) IV Push every 1 hour PRN Pre/post blood products, medications, blood draw, and to maintain line patency      -------------------------------------------------------------------------------  LABS:                        11.4   14.41 )-----------( 203      ( 25 Apr 2022 06:24 )             35.2     04-25    136  |  106  |  23  ----------------------------<  128<H>  4.1   |  22  |  0.98    Ca    8.3<L>      25 Apr 2022 06:24  Phos  3.4     04-25  Mg     2.2     04-25    TPro  4.6<L>  /  Alb  3.8  /  TBili  0.3  /  DBili  x   /  AST  31  /  ALT  47<H>  /  AlkPhos  25<L>  04-25    LIVER FUNCTIONS - ( 25 Apr 2022 06:24 )  Alb: 3.8 g/dL / Pro: 4.6 g/dL / ALK PHOS: 25 U/L / ALT: 47 U/L / AST: 31 U/L / GGT: x           PT/INR - ( 25 Apr 2022 06:24 )   PT: 13.1 sec;   INR: 1.10          PTT - ( 24 Apr 2022 06:29 )  PTT:26.0 sec    CAPILLARY BLOOD GLUCOSE          COVID-19 PCR: NotDetec (21 Apr 2022 21:40)      RADIOLOGY & ADDITIONAL TESTS: Reviewed.

## 2022-04-25 NOTE — PROGRESS NOTE ADULT - ATTENDING COMMENTS
Leg strength is improving (now 2/5, previously 0-1/5) and he no longer has dysmetria in UE  4th session of plex tomorrow  prednisone 60mg x 1 week total then lower to 50mg daily, decrease by 10mg every week until off  He is neurologically stable for EGD with biopsy   Plan for discharge to acute rehab once neurologic treatment is complete

## 2022-04-25 NOTE — PROGRESS NOTE ADULT - ASSESSMENT
71 M with no past medical history, not on any medications, transferred here from [facility] for bilateral lower extremity weakness, with concern for Transverse Myelitis

## 2022-04-25 NOTE — PROGRESS NOTE ADULT - PROBLEM SELECTOR PLAN 2
CT AP 2.3 x 2.4 x 3.1 at pancreatic head, as per records, workup initiated at [facility]; however, unsure of results.  - Ca-19-9 and CEA negative at [facility]   - MR abdomen/pelvis w cont - pancreatic mass indeterminate etiology    - f/u GI recs

## 2022-04-25 NOTE — PROGRESS NOTE ADULT - SUBJECTIVE AND OBJECTIVE BOX
LENGTH OF HOSPITAL STAY: 10d    SUBJECTIVE: No acute overnight events    HISTORY OF PRESENTING ILLNESS:  71 M with no past medical history, not on any medications, transferred here from [facility] for bilateral lower extremity weakness, with concern for Transverse Myelitis vs Guillan [place], for plasma exchange. Patient's story dates back to week and a half ago, when he went to [place] for a vacation, developed nausea, vomiting, diarrhea and weakness, accompanied by abd distension and difficulty urinating for which he went to [facility]. There, he was found to be in urosepsis, complicated by urinary obstruction for which Marie Catheter was placed. On day prior to planned discharge 4/12, he started to develop severe paresthesia, numbness, and weakness of bilateral lower extremities. He underwent abdominal imaging and MRI of spine at [facility], and was found to have pancreatic lesion at pancreatic head, and MRI thoracic spine consistent with transverse myelitis. He then was given high dose steroids for three days, which provided minimal improvement of symptoms. As a result, patient transferred to [facility] for possible plasma exchange.   Of note-patient travelled to [place] and [place] in November, for which he received a series of immunizations that he does not recall.     Currently, he is endorsing weakness, tingling, and numbness of his bilateral lower extremities up until the point of his umbilicus. He is denying any other symptoms of his UE, notes no weakness, paresthesias, tingling. Currently only complaint is abdominal pain and constipation.      Relevant imaging/studies from [facility]:  CT AP: Ill defined low density lesion within pancreatic head represent a benign or malignant neoplasm. Measures 2.2 x 2.4 x 3.1 cm which may represent a benign or malignant neoplasm, pancreatic lesion partially compresses the portal vein and superior aspect of SMV. Bowel: Mild diffuse wall thickening of the ascending transverse and upper half of the descending colon which may relate to possible mild non specific colitis.   MRI Thoracic:  Borderline abn signal within the central cord substance throughout entire length of thoracic spine could reflect transverse myelitis.   ESR- 30 CRP- 14.2  CSF total protein 80, CSF PCR negative, others pending (15 Apr 2022 20:38)    ALLERGIES:  No Known Allergies    MEDICATIONS:  STANDING MEDICATIONS  chlorhexidine 2% Cloths 1 Application(s) Topical <User Schedule>  cyanocobalamin 1000 MICROGram(s) Oral daily  enoxaparin Injectable 40 milliGRAM(s) SubCutaneous every 24 hours  gabapentin 100 milliGRAM(s) Oral three times a day  lactulose Syrup 10 Gram(s) Oral every 24 hours  melatonin 3 milliGRAM(s) Oral at bedtime  pantoprazole    Tablet 40 milliGRAM(s) Oral two times a day  polyethylene glycol 3350 17 Gram(s) Oral daily  predniSONE   Tablet 60 milliGRAM(s) Oral every 24 hours  senna 1 Tablet(s) Oral at bedtime  simethicone 80 milliGRAM(s) Chew two times a day  tamsulosin 0.4 milliGRAM(s) Oral at bedtime    PRN MEDICATIONS  acetaminophen     Tablet .. 650 milliGRAM(s) Oral every 6 hours PRN  sodium chloride 0.9% lock flush 10 milliLiter(s) IV Push every 1 hour PRN    VITALS:   T(F): 98.1  HR: 61  BP: 114/65  RR: 18  SpO2: 95%    LABS:                        11.4   14.41 )-----------( 203      ( 25 Apr 2022 06:24 )             35.2     04-25    136  |  106  |  23  ----------------------------<  128<H>  4.1   |  22  |  0.98    Ca    8.3<L>      25 Apr 2022 06:24  Phos  3.4     04-25  Mg     2.2     04-25    TPro  4.6<L>  /  Alb  3.8  /  TBili  0.3  /  DBili  x   /  AST  31  /  ALT  47<H>  /  AlkPhos  25<L>  04-25    PT/INR - ( 25 Apr 2022 06:24 )   PT: 13.1 sec;   INR: 1.10       PTT - ( 24 Apr 2022 06:29 )  PTT:26.0 sec    RADIOLOGY:  Reviewed    PHYSICAL EXAM:  GEN: No acute distress  HEENT: NCAT  LUNGS: Clear to auscultation bilaterally   HEART: S1/S2 present. RRR.   ABD: Soft, non-tender, non-distended. Bowel sounds present  EXT: No pitting edema  NEURO: AAOX3

## 2022-04-25 NOTE — PROGRESS NOTE ADULT - PROBLEM SELECTOR PLAN 1
Hx diarrhea two weeks prior to acute LE weakness, imaging consistent with transverse myelitis. Elevated inflammatory markers. Lumbar tap done at [facility]-for which studies pending. s/p x3d high dose steroids with minimal improvement in symptoms.   - s/p IVIG 0.65g/kg x3 days, HD cath placed 4/19 for PLEX starting 4/20 x5 sessions(heme/onc following)  - MR head and CSpine w/wout - faint T2 hyperintensity in posterior lower cervical cord without definite enhancement   - NMO and anti-MOG antibodies  - Autoimmune myelopathy panel sent 4/20 6pm  - PT started PLEX therapy 4/20, to be done every other day. Fourth sesssion tomorrow 4/26  - transitioned to 60mg PO prednisone daily on 4/24 Hx diarrhea two weeks prior to acute LE weakness, imaging consistent with transverse myelitis. Elevated inflammatory markers. Lumbar tap done at [facility]-for which studies pending. s/p x3d high dose steroids with minimal improvement in symptoms.   - s/p IVIG 0.65g/kg x3 days, HD cath placed 4/19 for PLEX starting 4/20 x5 sessions(heme/onc following)  - MR head and CSpine w/wout - faint T2 hyperintensity in posterior lower cervical cord without definite enhancement   - NMO and anti-MOG antibodies negative  - Autoimmune myelopathy panel sent 4/20 6pm  - PT started PLEX therapy 4/20, to be done every other day. Fourth sesssion tomorrow 4/26  - transitioned to 60mg PO prednisone daily on 4/24

## 2022-04-26 LAB
ALBUMIN SERPL ELPH-MCNC: 3.2 G/DL — LOW (ref 3.3–5)
ALP SERPL-CCNC: 36 U/L — LOW (ref 40–120)
ALT FLD-CCNC: 64 U/L — HIGH (ref 10–45)
ANION GAP SERPL CALC-SCNC: 6 MMOL/L — SIGNIFICANT CHANGE UP (ref 5–17)
APTT BLD: 28.9 SEC — SIGNIFICANT CHANGE UP (ref 27.5–35.5)
AST SERPL-CCNC: 35 U/L — SIGNIFICANT CHANGE UP (ref 10–40)
BASOPHILS # BLD AUTO: 0.02 K/UL — SIGNIFICANT CHANGE UP (ref 0–0.2)
BASOPHILS NFR BLD AUTO: 0.2 % — SIGNIFICANT CHANGE UP (ref 0–2)
BILIRUB SERPL-MCNC: 0.5 MG/DL — SIGNIFICANT CHANGE UP (ref 0.2–1.2)
BLD GP AB SCN SERPL QL: NEGATIVE — SIGNIFICANT CHANGE UP
BUN SERPL-MCNC: 21 MG/DL — SIGNIFICANT CHANGE UP (ref 7–23)
CALCIUM SERPL-MCNC: 7.8 MG/DL — LOW (ref 8.4–10.5)
CHLORIDE SERPL-SCNC: 106 MMOL/L — SIGNIFICANT CHANGE UP (ref 96–108)
CO2 SERPL-SCNC: 27 MMOL/L — SIGNIFICANT CHANGE UP (ref 22–31)
CREAT SERPL-MCNC: 1.05 MG/DL — SIGNIFICANT CHANGE UP (ref 0.5–1.3)
EGFR: 76 ML/MIN/1.73M2 — SIGNIFICANT CHANGE UP
EOSINOPHIL # BLD AUTO: 0.01 K/UL — SIGNIFICANT CHANGE UP (ref 0–0.5)
EOSINOPHIL NFR BLD AUTO: 0.1 % — SIGNIFICANT CHANGE UP (ref 0–6)
FIBRINOGEN PPP-MCNC: 135 MG/DL — LOW (ref 258–438)
GLUCOSE SERPL-MCNC: 90 MG/DL — SIGNIFICANT CHANGE UP (ref 70–99)
HCT VFR BLD CALC: 35.2 % — LOW (ref 39–50)
HGB BLD-MCNC: 11.5 G/DL — LOW (ref 13–17)
IMM GRANULOCYTES NFR BLD AUTO: 2.5 % — HIGH (ref 0–1.5)
INR BLD: 0.96 — SIGNIFICANT CHANGE UP (ref 0.88–1.16)
LYMPHOCYTES # BLD AUTO: 18.4 % — SIGNIFICANT CHANGE UP (ref 13–44)
LYMPHOCYTES # BLD AUTO: 2.39 K/UL — SIGNIFICANT CHANGE UP (ref 1–3.3)
MAGNESIUM SERPL-MCNC: 2.1 MG/DL — SIGNIFICANT CHANGE UP (ref 1.6–2.6)
MCHC RBC-ENTMCNC: 28.9 PG — SIGNIFICANT CHANGE UP (ref 27–34)
MCHC RBC-ENTMCNC: 32.7 GM/DL — SIGNIFICANT CHANGE UP (ref 32–36)
MCV RBC AUTO: 88.4 FL — SIGNIFICANT CHANGE UP (ref 80–100)
MONOCYTES # BLD AUTO: 0.77 K/UL — SIGNIFICANT CHANGE UP (ref 0–0.9)
MONOCYTES NFR BLD AUTO: 5.9 % — SIGNIFICANT CHANGE UP (ref 2–14)
NEUTROPHILS # BLD AUTO: 9.5 K/UL — HIGH (ref 1.8–7.4)
NEUTROPHILS NFR BLD AUTO: 72.9 % — SIGNIFICANT CHANGE UP (ref 43–77)
NRBC # BLD: 0 /100 WBCS — SIGNIFICANT CHANGE UP (ref 0–0)
PHOSPHATE SERPL-MCNC: 2.9 MG/DL — SIGNIFICANT CHANGE UP (ref 2.5–4.5)
PLATELET # BLD AUTO: 195 K/UL — SIGNIFICANT CHANGE UP (ref 150–400)
POTASSIUM SERPL-MCNC: 4 MMOL/L — SIGNIFICANT CHANGE UP (ref 3.5–5.3)
POTASSIUM SERPL-SCNC: 4 MMOL/L — SIGNIFICANT CHANGE UP (ref 3.5–5.3)
PROT SERPL-MCNC: 4.3 G/DL — LOW (ref 6–8.3)
PROTHROM AB SERPL-ACNC: 11.4 SEC — SIGNIFICANT CHANGE UP (ref 10.5–13.4)
RBC # BLD: 3.98 M/UL — LOW (ref 4.2–5.8)
RBC # FLD: 13.6 % — SIGNIFICANT CHANGE UP (ref 10.3–14.5)
RH IG SCN BLD-IMP: NEGATIVE — SIGNIFICANT CHANGE UP
SODIUM SERPL-SCNC: 139 MMOL/L — SIGNIFICANT CHANGE UP (ref 135–145)
WBC # BLD: 13.01 K/UL — HIGH (ref 3.8–10.5)
WBC # FLD AUTO: 13.01 K/UL — HIGH (ref 3.8–10.5)

## 2022-04-26 PROCEDURE — 99232 SBSQ HOSP IP/OBS MODERATE 35: CPT

## 2022-04-26 RX ADMIN — Medication 200 GRAM(S): at 13:57

## 2022-04-26 RX ADMIN — GABAPENTIN 100 MILLIGRAM(S): 400 CAPSULE ORAL at 06:04

## 2022-04-26 RX ADMIN — ENOXAPARIN SODIUM 40 MILLIGRAM(S): 100 INJECTION SUBCUTANEOUS at 22:14

## 2022-04-26 RX ADMIN — Medication 600 UNIT(S): at 13:26

## 2022-04-26 RX ADMIN — PANTOPRAZOLE SODIUM 40 MILLIGRAM(S): 20 TABLET, DELAYED RELEASE ORAL at 06:04

## 2022-04-26 RX ADMIN — TAMSULOSIN HYDROCHLORIDE 0.4 MILLIGRAM(S): 0.4 CAPSULE ORAL at 22:14

## 2022-04-26 RX ADMIN — Medication 60 MILLIGRAM(S): at 06:03

## 2022-04-26 RX ADMIN — PANTOPRAZOLE SODIUM 40 MILLIGRAM(S): 20 TABLET, DELAYED RELEASE ORAL at 18:55

## 2022-04-26 RX ADMIN — SENNA PLUS 1 TABLET(S): 8.6 TABLET ORAL at 22:14

## 2022-04-26 RX ADMIN — PREGABALIN 1000 MICROGRAM(S): 225 CAPSULE ORAL at 13:33

## 2022-04-26 RX ADMIN — GABAPENTIN 100 MILLIGRAM(S): 400 CAPSULE ORAL at 13:33

## 2022-04-26 RX ADMIN — CHLORHEXIDINE GLUCONATE 1 APPLICATION(S): 213 SOLUTION TOPICAL at 06:05

## 2022-04-26 RX ADMIN — Medication 1625 MILLILITER(S): at 16:37

## 2022-04-26 RX ADMIN — POLYETHYLENE GLYCOL 3350 17 GRAM(S): 17 POWDER, FOR SOLUTION ORAL at 13:33

## 2022-04-26 RX ADMIN — SIMETHICONE 80 MILLIGRAM(S): 80 TABLET, CHEWABLE ORAL at 18:55

## 2022-04-26 RX ADMIN — SIMETHICONE 80 MILLIGRAM(S): 80 TABLET, CHEWABLE ORAL at 06:03

## 2022-04-26 RX ADMIN — Medication 3 MILLIGRAM(S): at 22:14

## 2022-04-26 RX ADMIN — GABAPENTIN 100 MILLIGRAM(S): 400 CAPSULE ORAL at 22:02

## 2022-04-26 NOTE — PROGRESS NOTE ADULT - PROBLEM SELECTOR PLAN 3
RESOLVED. Presented with WBC of 16, elevated at [facility] as well. Could be 2/2 infection-history of UTI at [facility] treated with 5 day course of CTX, denying any complaints. No cough, no persistent GI symptoms, no diarrhea, no vomiting. Could be 2/2 reactive process given neurological symptomology. Could also be 2/2 prednisone.   - UA positive, nation exchanged   - CXR at [facility] clear without infiltrates  - Patient currently constipated-unlikely to be GI source.

## 2022-04-26 NOTE — PROGRESS NOTE ADULT - PROBLEM SELECTOR PLAN 5
Patient initially to [facility] with urosepsis c/b urinary retention, likely 2/2 neurological process. Nation placed at [facility].   -continue with flomax 0.4 QHS   -neuro workup as above  - patient nation'ed on 4/22 due to retention     #Complicated UTI   s/p CTX  - UClx growing >100K ESBL E. Coli, started Ertapenem 1g q24h x5days (4/20-4/24)

## 2022-04-26 NOTE — PROGRESS NOTE ADULT - PROBLEM SELECTOR PLAN 1
Hx diarrhea two weeks prior to acute LE weakness, imaging consistent with transverse myelitis. Elevated inflammatory markers. Lumbar tap done at [facility]-for which studies pending. s/p x3d high dose steroids with minimal improvement in symptoms.   - s/p IVIG 0.65g/kg x3 days, HD cath placed 4/19 for PLEX starting 4/20 x5 sessions(heme/onc following)  - MR head and CSpine w/wout - faint T2 hyperintensity in posterior lower cervical cord without definite enhancement   - NMO and anti-MOG antibodies negative  - Autoimmune myelopathy panel sent 4/20 6pm  - PT started PLEX therapy 4/20, to be done every other day. Fourth sesssion today 4/26. Fifth session on 4/28  - transitioned to 60mg PO prednisone daily on 4/24 Hx diarrhea two weeks prior to acute LE weakness, imaging consistent with transverse myelitis. Elevated inflammatory markers. Lumbar tap done at [facility]-for which studies pending. s/p x3d high dose steroids with minimal improvement in symptoms.   - s/p IVIG 0.65g/kg x3 days, HD cath placed 4/19 for PLEX starting 4/20 x5 sessions(heme/onc following)  - MR head and CSpine w/wout - faint T2 hyperintensity in posterior lower cervical cord without definite enhancement   - NMO and anti-MOG antibodies negative  - Autoimmune myelopathy panel sent 4/20 6pm  - PT started PLEX therapy 4/20, to be done every other day. Fourth session today 4/26. Fifth session on 4/28  - transitioned to 60mg PO prednisone daily on 4/24 Hx diarrhea two weeks prior to acute LE weakness, imaging consistent with transverse myelitis. Elevated inflammatory markers. Lumbar tap done at [facility]-for which studies pending. s/p x3d high dose steroids with minimal improvement in symptoms.   - s/p IVIG 0.65g/kg x3 days, HD cath placed 4/19 for PLEX starting 4/20 x5 sessions(heme/onc following)  - MR head and CSpine w/wout - faint T2 hyperintensity in posterior lower cervical cord without definite enhancement   - NMO and anti-MOG antibodies negative  - Autoimmune myelopathy panel sent 4/20 6pm  - PT started PLEX therapy 4/20, to be done every other day. Fourth session today 4/26. Fifth session on 4/28  - transitioned to 60mg PO prednisone daily on 4/24 - decrease by 10mg every week

## 2022-04-26 NOTE — PROGRESS NOTE ADULT - PROBLEM SELECTOR PLAN 2
CT AP 2.3 x 2.4 x 3.1 at pancreatic head, as per records, workup initiated at [facility]; however, unsure of results.  - Ca-19-9 and CEA negative at [facility]   - MR abdomen/pelvis w cont - pancreatic mass indeterminate etiology    - f/u GI recs CT AP 2.3 x 2.4 x 3.1 at pancreatic head, as per records, workup initiated at [facility]; however, unsure of results.  - Ca-19-9 and CEA negative at [facility]   - MR abdomen/pelvis w cont - pancreatic mass indeterminate etiology    - f/u GI recs  - per GI, EGD/EUS procedure to be performed after plasmapheresis and acute neurologic issues resolved. Will defer to outpatient as elective procedure.

## 2022-04-26 NOTE — PROGRESS NOTE ADULT - SUBJECTIVE AND OBJECTIVE BOX
LENGTH OF HOSPITAL STAY: 11d    SUBJECTIVE: No acute overnight events    HISTORY OF PRESENTING ILLNESS:   71 M with no past medical history, not on any medications, transferred here from [facility] for bilateral lower extremity weakness, with concern for Transverse Myelitis vs [place], for plasma exchange. Patient's story dates back to week and a half ago, when he went to [place] for a vacation, developed nausea, vomiting, diarrhea and weakness, accompanied by abd distension and difficulty urinating for which he went to [facility]. There, he was found to be in urosepsis, complicated by urinary obstruction for which Marie Catheter was placed. On day prior to planned discharge 4/12, he started to develop severe paresthesia, numbness, and weakness of bilateral lower extremities. He underwent abdominal imaging and MRI of spine at [facility], and was found to have pancreatic lesion at pancreatic head, and MRI thoracic spine consistent with transverse myelitis. He then was given high dose steroids for three days, which provided minimal improvement of symptoms. As a result, patient transferred to [facility] for possible plasma exchange.   Of note-patient travelled to [place] and [place] in November, for which he received a series of immunizations that he does not recall.     Currently, he is endorsing weakness, tingling, and numbness of his bilateral lower extremities up until the point of his umbilicus. He is denying any other symptoms of his UE, notes no weakness, paresthesias, tingling. Currently only complaint is abdominal pain and constipation.      Relevant imaging/studies from [facility]:  CT AP: Ill defined low density lesion within pancreatic head represent a benign or malignant neoplasm. Measures 2.2 x 2.4 x 3.1 cm which may represent a benign or malignant neoplasm, pancreatic lesion partially compresses the portal vein and superior aspect of SMV. Bowel: Mild diffuse wall thickening of the ascending transverse and upper half of the descending colon which may relate to possible mild non specific colitis.   MRI Thoracic:  Borderline abn signal within the central cord substance throughout entire length of thoracic spine could reflect transverse myelitis.   ESR- 30 CRP- 14.2  CSF total protein 80, CSF PCR negative, others pending (15 Apr 2022 20:38)    ALLERGIES:  No Known Allergies    MEDICATIONS:  STANDING MEDICATIONS  albumin human  5% IVPB 3250 milliLiter(s) IV Intermittent once  calcium gluconate IVPB 2 Gram(s) IV Intermittent once  chlorhexidine 2% Cloths 1 Application(s) Topical <User Schedule>  cyanocobalamin 1000 MICROGram(s) Oral daily  enoxaparin Injectable 40 milliGRAM(s) SubCutaneous every 24 hours  gabapentin 100 milliGRAM(s) Oral three times a day  heparin  Lock Flush 100 Units/mL Injectable 600 Unit(s) IV Push once  lactulose Syrup 10 Gram(s) Oral every 24 hours  melatonin 3 milliGRAM(s) Oral at bedtime  pantoprazole    Tablet 40 milliGRAM(s) Oral two times a day  polyethylene glycol 3350 17 Gram(s) Oral daily  predniSONE   Tablet 60 milliGRAM(s) Oral every 24 hours  senna 1 Tablet(s) Oral at bedtime  simethicone 80 milliGRAM(s) Chew two times a day  tamsulosin 0.4 milliGRAM(s) Oral at bedtime    PRN MEDICATIONS  acetaminophen     Tablet .. 650 milliGRAM(s) Oral every 6 hours PRN  sodium chloride 0.9% lock flush 10 milliLiter(s) IV Push every 1 hour PRN    VITALS:   T(F): 97.9  HR: 68  BP: 110/69  RR: 17  SpO2: 95%    LABS:                        11.5   13.01 )-----------( 195      ( 26 Apr 2022 06:19 )             35.2     04-26    139  |  106  |  21  ----------------------------<  90  4.0   |  27  |  1.05    Ca    7.8<L>      26 Apr 2022 06:19  Phos  2.9     04-26  Mg     2.1     04-26    TPro  4.3<L>  /  Alb  3.2<L>  /  TBili  0.5  /  DBili  x   /  AST  35  /  ALT  64<H>  /  AlkPhos  36<L>  04-26    PT/INR - ( 26 Apr 2022 06:19 )   PT: 11.4 sec;   INR: 0.96        PTT - ( 26 Apr 2022 06:19 )  PTT:28.9 sec    RADIOLOGY:  Reviewed    PHYSICAL EXAM:  GEN: No acute distress  HEENT: NCAT  LUNGS: Clear to auscultation bilaterally   HEART: S1/S2 present. RRR.   ABD: Soft, non-tender, non-distended. Bowel sounds present  EXT: No pitting edema  NEURO: AAOX3, 2/5 bilateral lower extremities

## 2022-04-26 NOTE — PROGRESS NOTE ADULT - ASSESSMENT
72 yo M with no significant PMHx, transferred from [facility] for bilateral lower extremity weakness, with working diagnosis of thoracic transverse myelitis, s/p lumbar puncture at [facility], failed high-dose steroids and expected to have minimal-no response to IVIG per Neurology. Incidentally found ot have a cystic pancreatic head lesion (2.2 x 2.4 x 3.1 cm), suspected IPMN, with partial compression of portal vein and superior aspect of SMV with non-specific colitis of ascending transverse and upper half of descending colon. MRCP (04/16/22) showed a complex cystic pancreatic head lesion, either IPMN or atypical serous cystadenoma, with recommendation for EUS and biopsy sampling. Hematology consulted for initiation of PLEX on 04/20/22 per Neurology.    #) DIAGNOSIS  - Steroid- and IVIG-refractory thoracic transverse myelitis  - Mild hypofibrinogenemia 2/2 PLEX  - Complex cystic pancreatic head mass     #) PLAN  - Per Neurology, planning for 5 sessions of PLEX starting on 04/20/22 via HD catheter (pending 4 of 5 session)  - Give 10 U Cryoprecipitate today after PLEX is completed  - Recheck fibrinogen and PT/PTT on 04/28/22 in AM prior to 5th treatment.   - Will need to trend CBC with differential, CMP once daily.  - Follow-up with GI for eventual biopsy of the pancreatic head lesion.   - Maintain active T+S, transfuse if Hb < 7 or if actively bleeding     Discussed with Dr. Anne

## 2022-04-26 NOTE — PROGRESS NOTE ADULT - SUBJECTIVE AND OBJECTIVE BOX
MARCIAL ROMANO, 71y, Male  MRN-[id number]  Patient is a 71y old  Male who presents with a chief complaint of transverse myelitis (25 Apr 2022 13:25)      OVERNIGHT EVENTS: naeo    SUBJECTIVE:    12 Point ROS Negative unless noted otherwise above.  -------------------------------------------------------------------------------  VITAL SIGNS:  Vital Signs Last 24 Hrs  T(C): 36.6 (26 Apr 2022 05:24), Max: 36.8 (25 Apr 2022 15:41)  T(F): 97.9 (26 Apr 2022 05:24), Max: 98.3 (25 Apr 2022 15:41)  HR: 68 (26 Apr 2022 05:24) (68 - 72)  BP: 110/69 (26 Apr 2022 05:24) (110/69 - 120/63)  BP(mean): --  RR: 17 (26 Apr 2022 05:24) (17 - 18)  SpO2: 95% (26 Apr 2022 05:24) (94% - 95%)  I&O's Summary    25 Apr 2022 07:01  -  26 Apr 2022 07:00  --------------------------------------------------------  IN: 0 mL / OUT: 600 mL / NET: -600 mL        PHYSICAL EXAM:    General: NAD, well developed  HEENT: NC/AT; EOMI, PERRLA, anicteric sclera; moist mucosal membranes.  Neck: supple, trachea midline  Cardiovascular: RRR, +S1/S2; NO M/R/G  Respiratory: CTA B/L; no W/R/R  Gastrointestinal: soft, NT/ND; +BSx4  Extremities: WWP; no edema or cyanosis  Vascular: 2+ radial, DP/PT pulses B/L  Neurological: AAOx3; no focal deficits    ALLERGIES:  Allergies    No Known Allergies    Intolerances        MEDICATIONS:  MEDICATIONS  (STANDING):  albumin human  5% IVPB 3250 milliLiter(s) IV Intermittent once  calcium gluconate IVPB 2 Gram(s) IV Intermittent once  chlorhexidine 2% Cloths 1 Application(s) Topical <User Schedule>  cyanocobalamin 1000 MICROGram(s) Oral daily  enoxaparin Injectable 40 milliGRAM(s) SubCutaneous every 24 hours  gabapentin 100 milliGRAM(s) Oral three times a day  heparin  Lock Flush 100 Units/mL Injectable 600 Unit(s) IV Push once  lactulose Syrup 10 Gram(s) Oral every 24 hours  melatonin 3 milliGRAM(s) Oral at bedtime  pantoprazole    Tablet 40 milliGRAM(s) Oral two times a day  polyethylene glycol 3350 17 Gram(s) Oral daily  predniSONE   Tablet 60 milliGRAM(s) Oral every 24 hours  senna 1 Tablet(s) Oral at bedtime  simethicone 80 milliGRAM(s) Chew two times a day  tamsulosin 0.4 milliGRAM(s) Oral at bedtime    MEDICATIONS  (PRN):  acetaminophen     Tablet .. 650 milliGRAM(s) Oral every 6 hours PRN Temp greater or equal to 38C (100.4F), Mild Pain (1 - 3)  sodium chloride 0.9% lock flush 10 milliLiter(s) IV Push every 1 hour PRN Pre/post blood products, medications, blood draw, and to maintain line patency      -------------------------------------------------------------------------------  LABS:                        11.5   13.01 )-----------( 195      ( 26 Apr 2022 06:19 )             35.2     04-26    139  |  106  |  21  ----------------------------<  90  4.0   |  27  |  1.05    Ca    7.8<L>      26 Apr 2022 06:19  Phos  2.9     04-26  Mg     2.1     04-26    TPro  4.3<L>  /  Alb  3.2<L>  /  TBili  0.5  /  DBili  x   /  AST  35  /  ALT  64<H>  /  AlkPhos  36<L>  04-26    LIVER FUNCTIONS - ( 26 Apr 2022 06:19 )  Alb: 3.2 g/dL / Pro: 4.3 g/dL / ALK PHOS: 36 U/L / ALT: 64 U/L / AST: 35 U/L / GGT: x           PT/INR - ( 26 Apr 2022 06:19 )   PT: 11.4 sec;   INR: 0.96          PTT - ( 26 Apr 2022 06:19 )  PTT:28.9 sec    CAPILLARY BLOOD GLUCOSE          COVID-19 PCR: NotDetec (21 Apr 2022 21:40)      RADIOLOGY & ADDITIONAL TESTS: Reviewed.       MARCIAL ROMANO, 71y, Male  MRN-[id number]  Patient is a 71y old  Male who presents with a chief complaint of transverse myelitis (25 Apr 2022 13:25)      OVERNIGHT EVENTS: naeo    SUBJECTIVE: Patient seen and examined at bedside. Reports improved LE strength. Denies fevers, chills, HA, chest pain, sob, nausea, vomiting, abdominal pain, diarrhea, constipation, dysuria.     12 Point ROS Negative unless noted otherwise above.  -------------------------------------------------------------------------------  VITAL SIGNS:  Vital Signs Last 24 Hrs  T(C): 36.6 (26 Apr 2022 05:24), Max: 36.8 (25 Apr 2022 15:41)  T(F): 97.9 (26 Apr 2022 05:24), Max: 98.3 (25 Apr 2022 15:41)  HR: 68 (26 Apr 2022 05:24) (68 - 72)  BP: 110/69 (26 Apr 2022 05:24) (110/69 - 120/63)  BP(mean): --  RR: 17 (26 Apr 2022 05:24) (17 - 18)  SpO2: 95% (26 Apr 2022 05:24) (94% - 95%)  I&O's Summary    25 Apr 2022 07:01  -  26 Apr 2022 07:00  --------------------------------------------------------  IN: 0 mL / OUT: 600 mL / NET: -600 mL        PHYSICAL EXAM:    General: NAD, well developed  HEENT: NC/AT; EOMI, PERRLA, anicteric sclera; moist mucosal membranes.  Neck: supple, trachea midline  Cardiovascular: RRR, +S1/S2; NO M/R/G  Respiratory: CTA B/L; no W/R/R  Gastrointestinal: soft, NT/ND; +BSx4  Extremities: WWP; no edema or cyanosis  Vascular: 2+ radial, DP/PT pulses B/L  Neurological: AAOx3; able to move feet and wiggle toes; slight ability to raise leg against gravity; numbness from umbilicus downwards     ALLERGIES:  Allergies    No Known Allergies    Intolerances        MEDICATIONS:  MEDICATIONS  (STANDING):  albumin human  5% IVPB 3250 milliLiter(s) IV Intermittent once  calcium gluconate IVPB 2 Gram(s) IV Intermittent once  chlorhexidine 2% Cloths 1 Application(s) Topical <User Schedule>  cyanocobalamin 1000 MICROGram(s) Oral daily  enoxaparin Injectable 40 milliGRAM(s) SubCutaneous every 24 hours  gabapentin 100 milliGRAM(s) Oral three times a day  heparin  Lock Flush 100 Units/mL Injectable 600 Unit(s) IV Push once  lactulose Syrup 10 Gram(s) Oral every 24 hours  melatonin 3 milliGRAM(s) Oral at bedtime  pantoprazole    Tablet 40 milliGRAM(s) Oral two times a day  polyethylene glycol 3350 17 Gram(s) Oral daily  predniSONE   Tablet 60 milliGRAM(s) Oral every 24 hours  senna 1 Tablet(s) Oral at bedtime  simethicone 80 milliGRAM(s) Chew two times a day  tamsulosin 0.4 milliGRAM(s) Oral at bedtime    MEDICATIONS  (PRN):  acetaminophen     Tablet .. 650 milliGRAM(s) Oral every 6 hours PRN Temp greater or equal to 38C (100.4F), Mild Pain (1 - 3)  sodium chloride 0.9% lock flush 10 milliLiter(s) IV Push every 1 hour PRN Pre/post blood products, medications, blood draw, and to maintain line patency      -------------------------------------------------------------------------------  LABS:                        11.5   13.01 )-----------( 195      ( 26 Apr 2022 06:19 )             35.2     04-26    139  |  106  |  21  ----------------------------<  90  4.0   |  27  |  1.05    Ca    7.8<L>      26 Apr 2022 06:19  Phos  2.9     04-26  Mg     2.1     04-26    TPro  4.3<L>  /  Alb  3.2<L>  /  TBili  0.5  /  DBili  x   /  AST  35  /  ALT  64<H>  /  AlkPhos  36<L>  04-26    LIVER FUNCTIONS - ( 26 Apr 2022 06:19 )  Alb: 3.2 g/dL / Pro: 4.3 g/dL / ALK PHOS: 36 U/L / ALT: 64 U/L / AST: 35 U/L / GGT: x           PT/INR - ( 26 Apr 2022 06:19 )   PT: 11.4 sec;   INR: 0.96          PTT - ( 26 Apr 2022 06:19 )  PTT:28.9 sec    CAPILLARY BLOOD GLUCOSE          COVID-19 PCR: NotDetec (21 Apr 2022 21:40)      RADIOLOGY & ADDITIONAL TESTS: Reviewed.     SUBJECTIVE: Patient seen and examined at bedside. Reports improved LE strength. Denies fevers, chills, HA, chest pain, sob, nausea, vomiting, abdominal pain, diarrhea, constipation, dysuria.     12 Point ROS Negative unless noted otherwise above.  -------------------------------------------------------------------------------  VITAL SIGNS:  Vital Signs Last 24 Hrs  T(C): 36.6 (26 Apr 2022 05:24), Max: 36.8 (25 Apr 2022 15:41)  T(F): 97.9 (26 Apr 2022 05:24), Max: 98.3 (25 Apr 2022 15:41)  HR: 68 (26 Apr 2022 05:24) (68 - 72)  BP: 110/69 (26 Apr 2022 05:24) (110/69 - 120/63)  BP(mean): --  RR: 17 (26 Apr 2022 05:24) (17 - 18)  SpO2: 95% (26 Apr 2022 05:24) (94% - 95%)  I&O's Summary    25 Apr 2022 07:01  -  26 Apr 2022 07:00  --------------------------------------------------------  IN: 0 mL / OUT: 600 mL / NET: -600 mL        PHYSICAL EXAM:    General: NAD, well developed  HEENT: NC/AT; EOMI, PERRLA, anicteric sclera; moist mucosal membranes.  Neck: supple, trachea midline  Cardiovascular: RRR, +S1/S2; NO M/R/G  Respiratory: CTA B/L; no W/R/R  Gastrointestinal: soft, NT/ND; +BSx4  Extremities: WWP; no edema or cyanosis  Vascular: 2+ radial, DP/PT pulses B/L  Neurological: AAOx3; able to move feet and wiggle toes; slight ability to raise leg against gravity; numbness from umbilicus downwards     ALLERGIES:  Allergies    No Known Allergies    Intolerances        MEDICATIONS:  MEDICATIONS  (STANDING):  albumin human  5% IVPB 3250 milliLiter(s) IV Intermittent once  calcium gluconate IVPB 2 Gram(s) IV Intermittent once  chlorhexidine 2% Cloths 1 Application(s) Topical <User Schedule>  cyanocobalamin 1000 MICROGram(s) Oral daily  enoxaparin Injectable 40 milliGRAM(s) SubCutaneous every 24 hours  gabapentin 100 milliGRAM(s) Oral three times a day  heparin  Lock Flush 100 Units/mL Injectable 600 Unit(s) IV Push once  lactulose Syrup 10 Gram(s) Oral every 24 hours  melatonin 3 milliGRAM(s) Oral at bedtime  pantoprazole    Tablet 40 milliGRAM(s) Oral two times a day  polyethylene glycol 3350 17 Gram(s) Oral daily  predniSONE   Tablet 60 milliGRAM(s) Oral every 24 hours  senna 1 Tablet(s) Oral at bedtime  simethicone 80 milliGRAM(s) Chew two times a day  tamsulosin 0.4 milliGRAM(s) Oral at bedtime    MEDICATIONS  (PRN):  acetaminophen     Tablet .. 650 milliGRAM(s) Oral every 6 hours PRN Temp greater or equal to 38C (100.4F), Mild Pain (1 - 3)  sodium chloride 0.9% lock flush 10 milliLiter(s) IV Push every 1 hour PRN Pre/post blood products, medications, blood draw, and to maintain line patency      -------------------------------------------------------------------------------  LABS:                        11.5   13.01 )-----------( 195      ( 26 Apr 2022 06:19 )             35.2     04-26    139  |  106  |  21  ----------------------------<  90  4.0   |  27  |  1.05    Ca    7.8<L>      26 Apr 2022 06:19  Phos  2.9     04-26  Mg     2.1     04-26    TPro  4.3<L>  /  Alb  3.2<L>  /  TBili  0.5  /  DBili  x   /  AST  35  /  ALT  64<H>  /  AlkPhos  36<L>  04-26    LIVER FUNCTIONS - ( 26 Apr 2022 06:19 )  Alb: 3.2 g/dL / Pro: 4.3 g/dL / ALK PHOS: 36 U/L / ALT: 64 U/L / AST: 35 U/L / GGT: x           PT/INR - ( 26 Apr 2022 06:19 )   PT: 11.4 sec;   INR: 0.96          PTT - ( 26 Apr 2022 06:19 )  PTT:28.9 sec    CAPILLARY BLOOD GLUCOSE          COVID-19 PCR: NotDetec (21 Apr 2022 21:40)      RADIOLOGY & ADDITIONAL TESTS: Reviewed.

## 2022-04-26 NOTE — PROGRESS NOTE ADULT - ATTENDING COMMENTS
Strength continues to improve gradually - 2/5 proximally, 3 to 3-/5 distally  No other complaints  4th session of PLEX today  No neurologic contraindication to EGD with biopsy, however GI wishes to defer to outpatient setting  Dispo planning to acute rehab once treatment completed

## 2022-04-27 LAB
ALBUMIN SERPL ELPH-MCNC: 3.8 G/DL — SIGNIFICANT CHANGE UP (ref 3.3–5)
ALP SERPL-CCNC: 22 U/L — LOW (ref 40–120)
ALT FLD-CCNC: 28 U/L — SIGNIFICANT CHANGE UP (ref 10–45)
ANION GAP SERPL CALC-SCNC: 8 MMOL/L — SIGNIFICANT CHANGE UP (ref 5–17)
APTT BLD: 32.7 SEC — SIGNIFICANT CHANGE UP (ref 27.5–35.5)
AST SERPL-CCNC: 18 U/L — SIGNIFICANT CHANGE UP (ref 10–40)
BASOPHILS # BLD AUTO: 0.01 K/UL — SIGNIFICANT CHANGE UP (ref 0–0.2)
BASOPHILS NFR BLD AUTO: 0.1 % — SIGNIFICANT CHANGE UP (ref 0–2)
BILIRUB SERPL-MCNC: 0.6 MG/DL — SIGNIFICANT CHANGE UP (ref 0.2–1.2)
BUN SERPL-MCNC: 19 MG/DL — SIGNIFICANT CHANGE UP (ref 7–23)
CALCIUM SERPL-MCNC: 8.1 MG/DL — LOW (ref 8.4–10.5)
CHLORIDE SERPL-SCNC: 106 MMOL/L — SIGNIFICANT CHANGE UP (ref 96–108)
CO2 SERPL-SCNC: 27 MMOL/L — SIGNIFICANT CHANGE UP (ref 22–31)
CREAT SERPL-MCNC: 1.04 MG/DL — SIGNIFICANT CHANGE UP (ref 0.5–1.3)
EGFR: 77 ML/MIN/1.73M2 — SIGNIFICANT CHANGE UP
EOSINOPHIL # BLD AUTO: 0.03 K/UL — SIGNIFICANT CHANGE UP (ref 0–0.5)
EOSINOPHIL NFR BLD AUTO: 0.3 % — SIGNIFICANT CHANGE UP (ref 0–6)
FIBRINOGEN PPP-MCNC: 209 MG/DL — LOW (ref 258–438)
GLUCOSE SERPL-MCNC: 96 MG/DL — SIGNIFICANT CHANGE UP (ref 70–99)
HCT VFR BLD CALC: 33.7 % — LOW (ref 39–50)
HEV IGM SER QL: SIGNIFICANT CHANGE UP
HGB BLD-MCNC: 11 G/DL — LOW (ref 13–17)
IMM GRANULOCYTES NFR BLD AUTO: 2.4 % — HIGH (ref 0–1.5)
INR BLD: 1.17 — HIGH (ref 0.88–1.16)
LYMPHOCYTES # BLD AUTO: 2.29 K/UL — SIGNIFICANT CHANGE UP (ref 1–3.3)
LYMPHOCYTES # BLD AUTO: 20 % — SIGNIFICANT CHANGE UP (ref 13–44)
MAGNESIUM SERPL-MCNC: 2.1 MG/DL — SIGNIFICANT CHANGE UP (ref 1.6–2.6)
MCHC RBC-ENTMCNC: 29.3 PG — SIGNIFICANT CHANGE UP (ref 27–34)
MCHC RBC-ENTMCNC: 32.6 GM/DL — SIGNIFICANT CHANGE UP (ref 32–36)
MCV RBC AUTO: 89.9 FL — SIGNIFICANT CHANGE UP (ref 80–100)
METHYLMALONATE SERPL-SCNC: 228 NMOL/L — SIGNIFICANT CHANGE UP (ref 0–378)
MONOCYTES # BLD AUTO: 0.53 K/UL — SIGNIFICANT CHANGE UP (ref 0–0.9)
MONOCYTES NFR BLD AUTO: 4.6 % — SIGNIFICANT CHANGE UP (ref 2–14)
NEUTROPHILS # BLD AUTO: 8.31 K/UL — HIGH (ref 1.8–7.4)
NEUTROPHILS NFR BLD AUTO: 72.6 % — SIGNIFICANT CHANGE UP (ref 43–77)
NRBC # BLD: 0 /100 WBCS — SIGNIFICANT CHANGE UP (ref 0–0)
PHOSPHATE SERPL-MCNC: 3.1 MG/DL — SIGNIFICANT CHANGE UP (ref 2.5–4.5)
PLATELET # BLD AUTO: 196 K/UL — SIGNIFICANT CHANGE UP (ref 150–400)
POTASSIUM SERPL-MCNC: 3.7 MMOL/L — SIGNIFICANT CHANGE UP (ref 3.5–5.3)
POTASSIUM SERPL-SCNC: 3.7 MMOL/L — SIGNIFICANT CHANGE UP (ref 3.5–5.3)
PROT SERPL-MCNC: 4.5 G/DL — LOW (ref 6–8.3)
PROTHROM AB SERPL-ACNC: 13.9 SEC — HIGH (ref 10.5–13.4)
RBC # BLD: 3.75 M/UL — LOW (ref 4.2–5.8)
RBC # FLD: 13.8 % — SIGNIFICANT CHANGE UP (ref 10.3–14.5)
SODIUM SERPL-SCNC: 141 MMOL/L — SIGNIFICANT CHANGE UP (ref 135–145)
VZV DNA, PCR RESULT: NEGATIVE — SIGNIFICANT CHANGE UP
WBC # BLD: 11.45 K/UL — HIGH (ref 3.8–10.5)
WBC # FLD AUTO: 11.45 K/UL — HIGH (ref 3.8–10.5)

## 2022-04-27 PROCEDURE — 99232 SBSQ HOSP IP/OBS MODERATE 35: CPT | Mod: GC

## 2022-04-27 PROCEDURE — 99233 SBSQ HOSP IP/OBS HIGH 50: CPT

## 2022-04-27 RX ORDER — CALCIUM GLUCONATE 100 MG/ML
2 VIAL (ML) INTRAVENOUS ONCE
Refills: 0 | Status: DISCONTINUED | OUTPATIENT
Start: 2022-04-28 | End: 2022-04-29

## 2022-04-27 RX ORDER — ALBUMIN HUMAN 25 %
3250 VIAL (ML) INTRAVENOUS ONCE
Refills: 0 | Status: DISCONTINUED | OUTPATIENT
Start: 2022-04-28 | End: 2022-04-29

## 2022-04-27 RX ADMIN — PANTOPRAZOLE SODIUM 40 MILLIGRAM(S): 20 TABLET, DELAYED RELEASE ORAL at 19:34

## 2022-04-27 RX ADMIN — ENOXAPARIN SODIUM 40 MILLIGRAM(S): 100 INJECTION SUBCUTANEOUS at 21:53

## 2022-04-27 RX ADMIN — GABAPENTIN 100 MILLIGRAM(S): 400 CAPSULE ORAL at 21:53

## 2022-04-27 RX ADMIN — Medication 3 MILLIGRAM(S): at 21:52

## 2022-04-27 RX ADMIN — PREGABALIN 1000 MICROGRAM(S): 225 CAPSULE ORAL at 11:50

## 2022-04-27 RX ADMIN — PANTOPRAZOLE SODIUM 40 MILLIGRAM(S): 20 TABLET, DELAYED RELEASE ORAL at 06:36

## 2022-04-27 RX ADMIN — SIMETHICONE 80 MILLIGRAM(S): 80 TABLET, CHEWABLE ORAL at 19:35

## 2022-04-27 RX ADMIN — GABAPENTIN 100 MILLIGRAM(S): 400 CAPSULE ORAL at 14:31

## 2022-04-27 RX ADMIN — SIMETHICONE 80 MILLIGRAM(S): 80 TABLET, CHEWABLE ORAL at 06:35

## 2022-04-27 RX ADMIN — TAMSULOSIN HYDROCHLORIDE 0.4 MILLIGRAM(S): 0.4 CAPSULE ORAL at 21:52

## 2022-04-27 RX ADMIN — SENNA PLUS 1 TABLET(S): 8.6 TABLET ORAL at 21:52

## 2022-04-27 RX ADMIN — CHLORHEXIDINE GLUCONATE 1 APPLICATION(S): 213 SOLUTION TOPICAL at 06:36

## 2022-04-27 RX ADMIN — GABAPENTIN 100 MILLIGRAM(S): 400 CAPSULE ORAL at 06:36

## 2022-04-27 RX ADMIN — Medication 60 MILLIGRAM(S): at 06:35

## 2022-04-27 RX ADMIN — LACTULOSE 10 GRAM(S): 10 SOLUTION ORAL at 11:49

## 2022-04-27 NOTE — PROGRESS NOTE ADULT - PROBLEM SELECTOR PLAN 5
Patient initially to [facility] with urosepsis c/b urinary retention, likely 2/2 neurological process. Nation placed at [facility].   -continue with flomax 0.4 QHS   -neuro workup as above  - patient nation'ed on 4/22 due to retention     #Complicated UTI   s/p CTX  - UClx growing >100K ESBL E. Coli, started Ertapenem 1g q24h x5days (4/20-4/24) Patient initially to [facility] with urosepsis c/b urinary retention, likely 2/2 neurological process. Nation placed at [facility].   -continue with flomax 0.4 QHS   -neuro workup as above  - patient nation'ed on 4/22 due to retention     #Complicated UTI   RESOLVED   s/p CTX  - UClx growing >100K ESBL E. Coli, completed Ertapenem 1g q24h x5days (4/20-4/24)

## 2022-04-27 NOTE — PROGRESS NOTE ADULT - ATTENDING COMMENTS
Pt seen and d/w fellow this afternoon.  Incidentally found pancreatic cystic lesion on CT.  Recommend outpt management with EUS-FNA of the lesion.

## 2022-04-27 NOTE — PROGRESS NOTE ADULT - ASSESSMENT
70 yo M with no significant PMHx, transferred from [facility] for bilateral lower extremity weakness, with working diagnosis of thoracic transverse myelitis, s/p lumbar puncture at [facility], failed high-dose steroids and expected to have minimal-no response to IVIG per Neurology. Incidentally found ot have a cystic pancreatic head lesion (2.2 x 2.4 x 3.1 cm), suspected IPMN, with partial compression of portal vein and superior aspect of SMV with non-specific colitis of ascending transverse and upper half of descending colon. MRCP (04/16/22) showed a complex cystic pancreatic head lesion, either IPMN or atypical serous cystadenoma, with recommendation for EUS and biopsy sampling. Hematology consulted for initiation of PLEX on 04/20/22 per Neurology.    #) DIAGNOSIS  - Steroid- and IVIG-refractory thoracic transverse myelitis  - Mild hypofibrinogenemia 2/2 PLEX  - Complex cystic pancreatic head mass     #) PLAN  - Per Neurology, planning for 5 sessions of PLEX starting on 04/20/22 via HD catheter (completed 4 of 5 sessions)  - Recheck fibrinogen and PT/PTT on 04/28/22 in AM prior to 5th treatment.   - Will need to trend CBC with differential, CMP once daily.  - Follow-up with GI for eventual biopsy of the pancreatic head lesion.   - Maintain active T+S, transfuse if Hb < 7 or if actively bleeding     Discussed with Dr. Anne   72 yo M with no significant PMHx, transferred from [facility] for bilateral lower extremity weakness, with working diagnosis of thoracic transverse myelitis, s/p lumbar puncture at [facility], failed high-dose steroids and expected to have minimal-no response to IVIG per Neurology. Incidentally found ot have a cystic pancreatic head lesion (2.2 x 2.4 x 3.1 cm), suspected IPMN, with partial compression of portal vein and superior aspect of SMV with non-specific colitis of ascending transverse and upper half of descending colon. MRCP (04/16/22) showed a complex cystic pancreatic head lesion, either IPMN or atypical serous cystadenoma, with recommendation for EUS and biopsy sampling. Hematology consulted for initiation of PLEX on 04/20/22 per Neurology.    #) DIAGNOSIS  - Steroid- and IVIG-refractory thoracic transverse myelitis  - Mild hypofibrinogenemia 2/2 PLEX  - Complex cystic pancreatic head mass     #) PLAN  - Per Neurology, planning for 5 sessions of PLEX starting on 04/20/22 via HD catheter (completed 4 of 5 sessions). Planning for 5th session for tomorrow  - Recheck fibrinogen and PT/PTT on 04/28/22 in AM prior to 5th treatment.   - Will need to trend CBC with differential, CMP once daily.  - Follow-up with GI for eventual biopsy of the pancreatic head lesion.   - Maintain active T+S, transfuse if Hb < 7 or if actively bleeding     Discussed with Dr. Anne   70 yo M with no significant PMHx, transferred from [facility] for bilateral lower extremity weakness, with working diagnosis of thoracic transverse myelitis, s/p lumbar puncture at [facility], failed high-dose steroids and expected to have minimal-no response to IVIG per Neurology. Incidentally found ot have a cystic pancreatic head lesion (2.2 x 2.4 x 3.1 cm), suspected IPMN, with partial compression of portal vein and superior aspect of SMV with non-specific colitis of ascending transverse and upper half of descending colon. MRCP (04/16/22) showed a complex cystic pancreatic head lesion, either IPMN or atypical serous cystadenoma, with recommendation for EUS and biopsy sampling. Hematology consulted for initiation of PLEX on 04/20/22 per Neurology.    #) DIAGNOSIS  - Steroid- and IVIG-refractory thoracic transverse myelitis  - Mild hypofibrinogenemia 2/2 PLEX  - Complex cystic pancreatic head mass     #) PLAN  - Per Neurology, planning for 7 sessions of PLEX starting on 04/20/22 via HD catheter (completed 4 of 7 sessions). Planning for 5th session for tomorrow.   - Recheck fibrinogen and PT/PTT on 04/28/22 in AM prior to 5th treatment.   - Will need to trend CBC with differential, CMP once daily.  - Follow-up with GI for eventual biopsy of the pancreatic head lesion.   - Maintain active T+S, transfuse if Hb < 7 or if actively bleeding     Discussed with Dr. Anne

## 2022-04-27 NOTE — PROGRESS NOTE ADULT - PROBLEM SELECTOR PLAN 2
CT AP 2.3 x 2.4 x 3.1 at pancreatic head, as per records, workup initiated at [facility]; however, unsure of results.  - Ca-19-9 and CEA negative at [facility]   - MR abdomen/pelvis w cont - pancreatic mass indeterminate etiology    - f/u GI recs  - per GI, EGD/EUS procedure to be performed after plasmapheresis and acute neurologic issues resolved. Will defer to outpatient as elective procedure.

## 2022-04-27 NOTE — PROGRESS NOTE ADULT - ATTENDING COMMENTS
Leg strength is improving, but still has significant weakness - 2/5 hip flexion, 3/5 knee extension, 3 to 4-/5 ankle dorsiflexion   Will extend to 7 sessions of plasmapheresis given severity of weakness and improvement on treatment  He is neurologically stable for discharge to acute rehab after treatment is complete  Will do another trial of void prior to discharge - likely monday (1 week after prior), continue nation for now

## 2022-04-27 NOTE — PROGRESS NOTE ADULT - ASSESSMENT
71yM w/no PMHx p/w bl LE weakness and numbness, with imaging suggestive of transverse myelitis. GI consulted for incidentally found pancreatic lesion on CT.    Abnormal imaging - CT A/P at OSH w/ill-defined low-density lesion in the pancreatic head/uncinate, ~2.2x2.4x3.1 cm, abutting the portal vein, superior aspect of SMV. CEA and Ca19-9 both within normal limits. Follow-up MR with multi-cystic structure in the uncinate measuring up to 3.5cm w/associated PD dilation; possible PD communication. Differential includes a large serous cystadenoma or more likely a main duct IPMN.  - EGD/EUS as outpatient after plasmapheresis and acute neurologic issues resolved    Transaminitis - Patient noted to have transaminitis on presentation in hepatocellular pattern, fluctuating throughout hospital course. T bili and alk phos normal. 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 HDV and HEV serologies negative. Low/normal ceruloplasmin, otherwise no abnormalities.  - Daily MELD labs - CMP and Coags    Recommendations discussed with primary team  Case discussed with attending physician  Please recall as needed with any gastroenterological questions  Please schedule patient for follow-up with Dr. Rojas at [phone]

## 2022-04-27 NOTE — PROGRESS NOTE ADULT - SUBJECTIVE AND OBJECTIVE BOX
LENGTH OF HOSPITAL STAY: 12d    SUBJECTIVE: No acute overnight events    HISTORY OF PRESENTING ILLNESS:   71 M with no past medical history, not on any medications, transferred here from [facility] for bilateral lower extremity weakness, with concern for Transverse Myelitis vs [place], for plasma exchange. Patient's story dates back to week and a half ago, when he went to [place] for a vacation, developed nausea, vomiting, diarrhea and weakness, accompanied by abd distension and difficulty urinating for which he went to [facility]. There, he was found to be in urosepsis, complicated by urinary obstruction for which Marie Catheter was placed. On day prior to planned discharge 4/12, he started to develop severe paresthesia, numbness, and weakness of bilateral lower extremities. He underwent abdominal imaging and MRI of spine at [facility], and was found to have pancreatic lesion at pancreatic head, and MRI thoracic spine consistent with transverse myelitis. He then was given high dose steroids for three days, which provided minimal improvement of symptoms. As a result, patient transferred to [facility] for possible plasma exchange.   Of note-patient travelled to [place] and [place] in November, for which he received a series of immunizations that he does not recall.     Currently, he is endorsing weakness, tingling, and numbness of his bilateral lower extremities up until the point of his umbilicus. He is denying any other symptoms of his UE, notes no weakness, paresthesias, tingling. Currently only complaint is abdominal pain and constipation.      Relevant imaging/studies from [facility]:  CT AP: Ill defined low density lesion within pancreatic head represent a benign or malignant neoplasm. Measures 2.2 x 2.4 x 3.1 cm which may represent a benign or malignant neoplasm, pancreatic lesion partially compresses the portal vein and superior aspect of SMV. Bowel: Mild diffuse wall thickening of the ascending transverse and upper half of the descending colon which may relate to possible mild non specific colitis.   MRI Thoracic:  Borderline abn signal within the central cord substance throughout entire length of thoracic spine could reflect transverse myelitis.   ESR- 30 CRP- 14.2  CSF total protein 80, CSF PCR negative, others pending (15 Apr 2022 20:38)    ALLERGIES:  No Known Allergies    MEDICATIONS:  STANDING MEDICATIONS  chlorhexidine 2% Cloths 1 Application(s) Topical <User Schedule>  cyanocobalamin 1000 MICROGram(s) Oral daily  enoxaparin Injectable 40 milliGRAM(s) SubCutaneous every 24 hours  gabapentin 100 milliGRAM(s) Oral three times a day  lactulose Syrup 10 Gram(s) Oral every 24 hours  melatonin 3 milliGRAM(s) Oral at bedtime  pantoprazole    Tablet 40 milliGRAM(s) Oral two times a day  polyethylene glycol 3350 17 Gram(s) Oral daily  predniSONE   Tablet 60 milliGRAM(s) Oral every 24 hours  senna 1 Tablet(s) Oral at bedtime  simethicone 80 milliGRAM(s) Chew two times a day  tamsulosin 0.4 milliGRAM(s) Oral at bedtime    PRN MEDICATIONS  acetaminophen     Tablet .. 650 milliGRAM(s) Oral every 6 hours PRN  sodium chloride 0.9% lock flush 10 milliLiter(s) IV Push every 1 hour PRN    VITALS:   T(F): 98.1  HR: 77  BP: 117/67  RR: 18  SpO2: 95%    LABS:                        11.0   11.45 )-----------( 196      ( 27 Apr 2022 07:22 )             33.7     04-27    141  |  106  |  19  ----------------------------<  96  3.7   |  27  |  1.04    Ca    8.1<L>      27 Apr 2022 07:22  Phos  3.1     04-27  Mg     2.1     04-27    TPro  4.5<L>  /  Alb  3.8  /  TBili  0.6  /  DBili  x   /  AST  18  /  ALT  28  /  AlkPhos  22<L>  04-27    PT/INR - ( 27 Apr 2022 07:22 )   PT: 13.9 sec;   INR: 1.17       PTT - ( 27 Apr 2022 07:22 )  PTT:32.7 sec    RADIOLOGY:  Reviewed    PHYSICAL EXAM:  GEN: No acute distress  HEENT: NCAT  LUNGS: Clear to auscultation bilaterally   HEART: S1/S2 present. RRR.   ABD: Soft, non-tender, non-distended. Bowel sounds present  EXT: 3/5 in bilateral LE   NEURO: AAOX3

## 2022-04-27 NOTE — PROGRESS NOTE ADULT - SUBJECTIVE AND OBJECTIVE BOX
GASTROENTEROLOGY PROGRESS NOTE  Patient seen and examined at bedside. No acute complaints.    PERTINENT REVIEW OF SYSTEMS:  CONSTITUTIONAL: No weakness, fevers or chills  HEENT: No visual changes; No vertigo or throat pain   GASTROINTESTINAL: No abdominal or epigastric pain. No nausea, vomiting, or hematemesis; No diarrhea or constipation. No melena or hematochezia.  NEUROLOGICAL: No numbness or weakness  SKIN: No itching, burning, rashes, or lesions     Allergies    No Known Allergies    Intolerances      MEDICATIONS:  MEDICATIONS  (STANDING):  chlorhexidine 2% Cloths 1 Application(s) Topical <User Schedule>  cyanocobalamin 1000 MICROGram(s) Oral daily  enoxaparin Injectable 40 milliGRAM(s) SubCutaneous every 24 hours  gabapentin 100 milliGRAM(s) Oral three times a day  lactulose Syrup 10 Gram(s) Oral every 24 hours  melatonin 3 milliGRAM(s) Oral at bedtime  pantoprazole    Tablet 40 milliGRAM(s) Oral two times a day  polyethylene glycol 3350 17 Gram(s) Oral daily  predniSONE   Tablet 60 milliGRAM(s) Oral every 24 hours  senna 1 Tablet(s) Oral at bedtime  simethicone 80 milliGRAM(s) Chew two times a day  tamsulosin 0.4 milliGRAM(s) Oral at bedtime    MEDICATIONS  (PRN):  acetaminophen     Tablet .. 650 milliGRAM(s) Oral every 6 hours PRN Temp greater or equal to 38C (100.4F), Mild Pain (1 - 3)  sodium chloride 0.9% lock flush 10 milliLiter(s) IV Push every 1 hour PRN Pre/post blood products, medications, blood draw, and to maintain line patency    Vital Signs Last 24 Hrs  T(C): 36.7 (27 Apr 2022 11:38), Max: 36.9 (26 Apr 2022 14:20)  T(F): 98.1 (27 Apr 2022 11:38), Max: 98.4 (26 Apr 2022 14:20)  HR: 73 (27 Apr 2022 11:38) (73 - 77)  BP: 107/62 (27 Apr 2022 11:38) (107/62 - 134/75)  BP(mean): --  RR: 18 (27 Apr 2022 11:38) (18 - 18)  SpO2: 95% (27 Apr 2022 11:38) (95% - 95%)    04-26 @ 07:01  -  04-27 @ 07:00  --------------------------------------------------------  IN: 0 mL / OUT: 1150 mL / NET: -1150 mL    04-27 @ 07:01 - 04-27 @ 14:00  --------------------------------------------------------  IN: 0 mL / OUT: 800 mL / NET: -800 mL      PHYSICAL EXAM:    General: Laying in bed, NAD  HEENT: MMM, conjunctiva and sclera clear; R sided PLEX catheter  Gastrointestinal: Soft non-tender non-distended; Normal bowel sounds; No hepatosplenomegaly. No rebound or guarding  Skin: Warm and dry. No obvious rash    LABS:                        11.0   11.45 )-----------( 196      ( 27 Apr 2022 07:22 )             33.7     04-27    141  |  106  |  19  ----------------------------<  96  3.7   |  27  |  1.04    Ca    8.1<L>      27 Apr 2022 07:22  Phos  3.1     04-27  Mg     2.1     04-27    TPro  4.5<L>  /  Alb  3.8  /  TBili  0.6  /  DBili  x   /  AST  18  /  ALT  28  /  AlkPhos  22<L>  04-27    PT/INR - ( 27 Apr 2022 07:22 )   PT: 13.9 sec;   INR: 1.17          PTT - ( 27 Apr 2022 07:22 )  PTT:32.7 sec                  RADIOLOGY & ADDITIONAL STUDIES:  Reviewed

## 2022-04-27 NOTE — PROGRESS NOTE ADULT - PROBLEM SELECTOR PLAN 3
RESOLVED. Presented with WBC of 16, elevated at [facility] as well. Could be 2/2 infection-history of UTI at [facility] treated with 5 day course of CTX, denying any complaints. No cough, no persistent GI symptoms, no diarrhea, no vomiting. Could be 2/2 reactive process given neurological symptomology. Could also be 2/2 prednisone.   - UA positive, nation exchanged   - CXR at [facility] clear without infiltrates  - Patient currently constipated-unlikely to be GI source.

## 2022-04-27 NOTE — PROGRESS NOTE ADULT - ASSESSMENT
per Internal Medicine    71 y o M with no past medical history, not on any medications, transferred here from [facility] for bilateral lower extremity weakness, with concern for Transverse Myelitis    Problem/Plan - 1:  ·  Problem: Lower extremity weakness.   ·  Plan: Hx diarrhea two weeks prior to acute LE weakness, imaging consistent with transverse myelitis. Elevated inflammatory markers. Lumbar tap done at [facility]-for which studies pending. s/p x3d high dose steroids with minimal improvement in symptoms.   - s/p IVIG 0.65g/kg x3 days, HD cath placed 4/19 for PLEX starting 4/20 x5 sessions(heme/onc following)  - MR head and CSpine w/wout - faint T2 hyperintensity in posterior lower cervical cord without definite enhancement   - NMO and anti-MOG antibodies negative  - Autoimmune myelopathy panel sent 4/20 6pm  - PT started PLEX therapy 4/20, to be done every other day. Fourth session done on 4/26. Fifth session on 4/28  - transitioned to 60mg PO prednisone daily on 4/24 - decrease by 10mg every week  - to be discharged to acute rehab once treatment is complete.    Problem/Plan - 2:  ·  Problem: Pancreatic mass.   ·  Plan: CT AP 2.3 x 2.4 x 3.1 at pancreatic head, as per records, workup initiated at [facility]; however, unsure of results.  - Ca-19-9 and CEA negative at [facility]   - MR abdomen/pelvis w cont - pancreatic mass indeterminate etiology    - f/u GI recs  - per GI, EGD/EUS procedure to be performed after plasmapheresis and acute neurologic issues resolved. Will defer to outpatient as elective procedure.    Problem/Plan - 3:  ·  Problem: Leukocytosis.   ·  Plan: RESOLVED. Presented with WBC of 16, elevated at [facility] as well. Could be 2/2 infection-history of UTI at [facility] treated with 5 day course of CTX, denying any complaints. No cough, no persistent GI symptoms, no diarrhea, no vomiting. Could be 2/2 reactive process given neurological symptomology. Could also be 2/2 prednisone.   - UA positive, nation exchanged   - CXR at [facility] clear without infiltrates  - Patient currently constipated-unlikely to be GI source.    Problem/Plan - 4:  ·  Problem: Transaminitis.   ·  Plan: Downtrending LFTs, likely 2/2 pancreatic mass versus autoimmune etiology. No significant drinking or drug history. No tenderness on exam, no organomegaly.   - follow malignancy workup as above.  - f/u numerous titers and PCR's ordered per GI.    Problem/Plan - 5:  ·  Problem: Urinary retention.   ·  Plan: Patient initially to [facility] with urosepsis c/b urinary retention, likely 2/2 neurological process. Nation placed at [facility].   -continue with flomax 0.4 QHS   -neuro workup as above  - patient nation'ed on 4/22 due to retention     #Complicated UTI   RESOLVED   s/p CTX  - UClx growing >100K ESBL E. Coli, completed Ertapenem 1g q24h x5days (4/20-4/24).    Problem/Plan - 6:  ·  Problem: Preventive measure.   ·  Plan: F-none    E-replete PRN    N-regular diet      DVT-lovenox 40SQ.

## 2022-04-27 NOTE — PROGRESS NOTE ADULT - REASON FOR ADMISSION
Bilateral lower extremity weakness
TM
transverse myelitis
TM
TM
transverse myelitis
Bilateral lower extremity weakness

## 2022-04-27 NOTE — PROGRESS NOTE ADULT - ASSESSMENT
71 M with no past medical history, not on any medications, transferred here from [facility] for bilateral lower extremity weakness, with concern for Transverse Myelitis

## 2022-04-27 NOTE — PROGRESS NOTE ADULT - SUBJECTIVE AND OBJECTIVE BOX
Physical Medicine and Rehabilitation Progress Note:    Patient is a 71y old  Male who presents with a chief complaint of transverse myelitis (25 Apr 2022 13:25)      HPI:  71 M with no past medical history, not on any medications, transferred here from [facility] for bilateral lower extremity weakness, with concern for Transverse Myelitis vs Guillan [place], for plasma exchange. Patient's story dates back to week and a half ago, when he went to [place] for a vacation, developed nausea, vomiting, diarrhea and weakness, accompanied by abd distension and difficulty urinating for which he went to [facility]. There, he was found to be in urosepsis, complicated by urinary obstruction for which Marie Catheter was placed. On day prior to planned discharge 4/12, he started to develop severe paresthesia, numbness, and weakness of bilateral lower extremities. He underwent abdominal imaging and MRI of spine at [facility], and was found to have pancreatic lesion at pancreatic head, and MRI thoracic spine consistent with transverse myelitis. He then was given high dose steroids for three days, which provided minimal improvement of symptoms. As a result, patient transferred to [facility] for possible plasma exchange.   Of note-patient travelled to [place] and [place] in November, for which he received a series of immunizations that he does not recall.     Currently, he is endorsing weakness, tingling, and numbness of his bilateral lower extremities up until the point of his umbilicus. He is denying any other symptoms of his UE, notes no weakness, paresthesias, tingling. Currently only complaint is abdominal pain and constipation.      Relevant imaging/studies from [facility]:  CT AP: Ill defined low density lesion within pancreatic head represent a benign or malignant neoplasm. Measures 2.2 x 2.4 x 3.1 cm which may represent a benign or malignant neoplasm, pancreatic lesion partially compresses the portal vein and superior aspect of SMV. Bowel: Mild diffuse wall thickening of the ascending transverse and upper half of the descending colon which may relate to possible mild non specific colitis.   MRI Thoracic:  Borderline abn signal within the central cord substance throughout entire length of thoracic spine could reflect transverse myelitis.   ESR- 30 CRP- 14.2  CSF total protein 80, CSF PCR negative, others pending (15 Apr 2022 20:38)                            11.0   11.45 )-----------( 196      ( 27 Apr 2022 07:22 )             33.7       04-27    141  |  106  |  19  ----------------------------<  96  3.7   |  27  |  1.04    Ca    8.1<L>      27 Apr 2022 07:22  Phos  3.1     04-27  Mg     2.1     04-27    TPro  4.5<L>  /  Alb  3.8  /  TBili  0.6  /  DBili  x   /  AST  18  /  ALT  28  /  AlkPhos  22<L>  04-27    Vital Signs Last 24 Hrs  T(C): 36.7 (27 Apr 2022 11:38), Max: 36.7 (26 Apr 2022 21:59)  T(F): 98.1 (27 Apr 2022 11:38), Max: 98.1 (26 Apr 2022 21:59)  HR: 73 (27 Apr 2022 11:38) (73 - 77)  BP: 107/62 (27 Apr 2022 11:38) (107/62 - 117/67)  BP(mean): --  RR: 18 (27 Apr 2022 11:38) (18 - 18)  SpO2: 95% (27 Apr 2022 11:38) (95% - 95%)    MEDICATIONS  (STANDING):  chlorhexidine 2% Cloths 1 Application(s) Topical <User Schedule>  cyanocobalamin 1000 MICROGram(s) Oral daily  enoxaparin Injectable 40 milliGRAM(s) SubCutaneous every 24 hours  gabapentin 100 milliGRAM(s) Oral three times a day  lactulose Syrup 10 Gram(s) Oral every 24 hours  melatonin 3 milliGRAM(s) Oral at bedtime  pantoprazole    Tablet 40 milliGRAM(s) Oral two times a day  polyethylene glycol 3350 17 Gram(s) Oral daily  predniSONE   Tablet 60 milliGRAM(s) Oral every 24 hours  senna 1 Tablet(s) Oral at bedtime  simethicone 80 milliGRAM(s) Chew two times a day  tamsulosin 0.4 milliGRAM(s) Oral at bedtime    MEDICATIONS  (PRN):  acetaminophen     Tablet .. 650 milliGRAM(s) Oral every 6 hours PRN Temp greater or equal to 38C (100.4F), Mild Pain (1 - 3)  sodium chloride 0.9% lock flush 10 milliLiter(s) IV Push every 1 hour PRN Pre/post blood products, medications, blood draw, and to maintain line patency    Currently Undergoing Physical/ Occupational Therapy at bedside.    PT/OT Functional Status Assessment:   4/26/2022    Therapeutic Interventions      Bed Mobility  Bed Mobility Training Rehab Potential: good, to achieve stated therapy goals  Bed Mobility Training Rolling/Turning: minimum assist (75% patient effort);  2 person assist;  bed rails  Bed Mobility Training Scooting: moderate assist (50% patient effort);  2 person assist;  bed rails  Bed Mobility Training Bridging: minimum assist (75% patient effort);  2 person assist;  to maintain hips in bridging position; patient able to lift pelvis 1-2" off of bed.  Bed Mobility Training Sit-to-Supine: moderate assist (50% patient effort);  2 person assist;  1 person to maintain trunk control; 1 person to assist with leg  Bed Mobility Training Supine-to-Sit: moderate assist (50% patient effort);  2 person assist;  1 person to maintain trunk control; 1 person to assist with legs and lifting patient into upright seated position at EOB  Bed Mobility Training Limitations: decreased ability to use legs for bridging/pushing;  impaired ability to control trunk for mobility;  decreased ability to use arms for pushing/pulling;  impaired postural control;  impaired sensory feedback;  impaired motor control;  impaired coordination;  impaired balance;  decreased strength    Functional Endurance  Functional Endurance Rehab Effort: good  Functional Endurance Symptoms Noted During/After Treatment: none  Functional Endurance Detail: supine <> sit with mod-Ax2; bed rolling R/L x5 ea with min-A and use of bed rails; scooting up with use of bed rails mod-A x2; sitting at EOB for trunk endurance     Neuromuscular Re-education  Neuromuscular Re-education Rehab Effort: good  Neuromuscular Re-education Symptoms Noted During/After Treatment: none  Neuromuscular Re-education Detail: supine: bridges with ADD pillow squeeze to re-engage glutes/hip ADDsseated at EOB: SAQs with tactile cuing 2x5 ea B/L; trunk rotations x 5ea B/L; trunk flex/ext with use of UEs for support/stability 2x5; lateral trunk flex onto forearms 5x ea B/L (min-A of 2 for all activities)       Therapeutic Exercise  Therapeutic Exercise Detail: Pt tolerated sitting EOB ~25min performing R/L Lateral leaning with weight bearing on forearm and trunk anterior/posterior weight shifting [id number] requiring Min-ModAx1 (improvement) to return to midline. BLE AAROM [id number] with increased activation demo throughout (2+/5 sitting EOB against gravity). Pt performed b/l UE reaching activity across midline to facilitate trunk rotation, requiring Min-Mod Ax1 to maintain sitting balance.     Upper Body Dressing Training  Upper Body Dressing Training Assistance: contact guard;  minimum assist (75% patient effort);  2 person assist;  verbal cues;  nonverbal cues (demo/gestures);  don/doff gown sitting EOB;  impaired balance;  decreased strength;  decreased flexibility;  impaired postural control;  impaired motor control          PM&R Impression: as above    Current Disposition Plan Recommendations:    acute rehab placement

## 2022-04-27 NOTE — PROGRESS NOTE ADULT - PROBLEM SELECTOR PLAN 1
Hx diarrhea two weeks prior to acute LE weakness, imaging consistent with transverse myelitis. Elevated inflammatory markers. Lumbar tap done at [facility]-for which studies pending. s/p x3d high dose steroids with minimal improvement in symptoms.   - s/p IVIG 0.65g/kg x3 days, HD cath placed 4/19 for PLEX starting 4/20 x5 sessions(heme/onc following)  - MR head and CSpine w/wout - faint T2 hyperintensity in posterior lower cervical cord without definite enhancement   - NMO and anti-MOG antibodies negative  - Autoimmune myelopathy panel sent 4/20 6pm  - PT started PLEX therapy 4/20, to be done every other day. Fourth session today 4/26. Fifth session on 4/28  - transitioned to 60mg PO prednisone daily on 4/24 - decrease by 10mg every week Hx diarrhea two weeks prior to acute LE weakness, imaging consistent with transverse myelitis. Elevated inflammatory markers. Lumbar tap done at [facility]-for which studies pending. s/p x3d high dose steroids with minimal improvement in symptoms.   - s/p IVIG 0.65g/kg x3 days, HD cath placed 4/19 for PLEX starting 4/20 x5 sessions(heme/onc following)  - MR head and CSpine w/wout - faint T2 hyperintensity in posterior lower cervical cord without definite enhancement   - NMO and anti-MOG antibodies negative  - Autoimmune myelopathy panel sent 4/20 6pm  - PT started PLEX therapy 4/20, to be done every other day. Fourth session done on 4/26. Fifth session on 4/28  - transitioned to 60mg PO prednisone daily on 4/24 - decrease by 10mg every week  - to be discharged to acute rehab once treatment is complete

## 2022-04-28 LAB
ALBUMIN SERPL ELPH-MCNC: 3.7 G/DL — SIGNIFICANT CHANGE UP (ref 3.3–5)
ALP SERPL-CCNC: 31 U/L — LOW (ref 40–120)
ALT FLD-CCNC: 35 U/L — SIGNIFICANT CHANGE UP (ref 10–45)
ANION GAP SERPL CALC-SCNC: 10 MMOL/L — SIGNIFICANT CHANGE UP (ref 5–17)
APTT BLD: 25.9 SEC — LOW (ref 27.5–35.5)
AST SERPL-CCNC: 23 U/L — SIGNIFICANT CHANGE UP (ref 10–40)
BASOPHILS # BLD AUTO: 0.01 K/UL — SIGNIFICANT CHANGE UP (ref 0–0.2)
BASOPHILS NFR BLD AUTO: 0.1 % — SIGNIFICANT CHANGE UP (ref 0–2)
BILIRUB SERPL-MCNC: 0.5 MG/DL — SIGNIFICANT CHANGE UP (ref 0.2–1.2)
BUN SERPL-MCNC: 17 MG/DL — SIGNIFICANT CHANGE UP (ref 7–23)
CALCIUM SERPL-MCNC: 8.1 MG/DL — LOW (ref 8.4–10.5)
CHLORIDE SERPL-SCNC: 104 MMOL/L — SIGNIFICANT CHANGE UP (ref 96–108)
CO2 SERPL-SCNC: 25 MMOL/L — SIGNIFICANT CHANGE UP (ref 22–31)
CREAT SERPL-MCNC: 0.94 MG/DL — SIGNIFICANT CHANGE UP (ref 0.5–1.3)
EGFR: 87 ML/MIN/1.73M2 — SIGNIFICANT CHANGE UP
EOSINOPHIL # BLD AUTO: 0.05 K/UL — SIGNIFICANT CHANGE UP (ref 0–0.5)
EOSINOPHIL NFR BLD AUTO: 0.5 % — SIGNIFICANT CHANGE UP (ref 0–6)
FIBRINOGEN PPP-MCNC: 220 MG/DL — LOW (ref 258–438)
GLUCOSE SERPL-MCNC: 91 MG/DL — SIGNIFICANT CHANGE UP (ref 70–99)
HCT VFR BLD CALC: 34.3 % — LOW (ref 39–50)
HGB BLD-MCNC: 11.1 G/DL — LOW (ref 13–17)
IMM GRANULOCYTES NFR BLD AUTO: 2.9 % — HIGH (ref 0–1.5)
INR BLD: 1.01 — SIGNIFICANT CHANGE UP (ref 0.88–1.16)
LYMPHOCYTES # BLD AUTO: 2.49 K/UL — SIGNIFICANT CHANGE UP (ref 1–3.3)
LYMPHOCYTES # BLD AUTO: 24.2 % — SIGNIFICANT CHANGE UP (ref 13–44)
MAGNESIUM SERPL-MCNC: 2.1 MG/DL — SIGNIFICANT CHANGE UP (ref 1.6–2.6)
MCHC RBC-ENTMCNC: 28.5 PG — SIGNIFICANT CHANGE UP (ref 27–34)
MCHC RBC-ENTMCNC: 32.4 GM/DL — SIGNIFICANT CHANGE UP (ref 32–36)
MCV RBC AUTO: 88.2 FL — SIGNIFICANT CHANGE UP (ref 80–100)
MONOCYTES # BLD AUTO: 0.48 K/UL — SIGNIFICANT CHANGE UP (ref 0–0.9)
MONOCYTES NFR BLD AUTO: 4.7 % — SIGNIFICANT CHANGE UP (ref 2–14)
NEUTROPHILS # BLD AUTO: 6.97 K/UL — SIGNIFICANT CHANGE UP (ref 1.8–7.4)
NEUTROPHILS NFR BLD AUTO: 67.6 % — SIGNIFICANT CHANGE UP (ref 43–77)
NRBC # BLD: 0 /100 WBCS — SIGNIFICANT CHANGE UP (ref 0–0)
PHOSPHATE SERPL-MCNC: 3.4 MG/DL — SIGNIFICANT CHANGE UP (ref 2.5–4.5)
PLATELET # BLD AUTO: 204 K/UL — SIGNIFICANT CHANGE UP (ref 150–400)
POTASSIUM SERPL-MCNC: 3.8 MMOL/L — SIGNIFICANT CHANGE UP (ref 3.5–5.3)
POTASSIUM SERPL-SCNC: 3.8 MMOL/L — SIGNIFICANT CHANGE UP (ref 3.5–5.3)
PROT SERPL-MCNC: 4.6 G/DL — LOW (ref 6–8.3)
PROTHROM AB SERPL-ACNC: 12 SEC — SIGNIFICANT CHANGE UP (ref 10.5–13.4)
RBC # BLD: 3.89 M/UL — LOW (ref 4.2–5.8)
RBC # FLD: 13.9 % — SIGNIFICANT CHANGE UP (ref 10.3–14.5)
SARS-COV-2 RNA SPEC QL NAA+PROBE: SIGNIFICANT CHANGE UP
SODIUM SERPL-SCNC: 139 MMOL/L — SIGNIFICANT CHANGE UP (ref 135–145)
WBC # BLD: 10.3 K/UL — SIGNIFICANT CHANGE UP (ref 3.8–10.5)
WBC # FLD AUTO: 10.3 K/UL — SIGNIFICANT CHANGE UP (ref 3.8–10.5)

## 2022-04-28 PROCEDURE — 99231 SBSQ HOSP IP/OBS SF/LOW 25: CPT

## 2022-04-28 RX ADMIN — SIMETHICONE 80 MILLIGRAM(S): 80 TABLET, CHEWABLE ORAL at 06:01

## 2022-04-28 RX ADMIN — PANTOPRAZOLE SODIUM 40 MILLIGRAM(S): 20 TABLET, DELAYED RELEASE ORAL at 18:00

## 2022-04-28 RX ADMIN — Medication 3 MILLIGRAM(S): at 23:33

## 2022-04-28 RX ADMIN — LACTULOSE 10 GRAM(S): 10 SOLUTION ORAL at 11:49

## 2022-04-28 RX ADMIN — CHLORHEXIDINE GLUCONATE 1 APPLICATION(S): 213 SOLUTION TOPICAL at 06:02

## 2022-04-28 RX ADMIN — PREGABALIN 1000 MICROGRAM(S): 225 CAPSULE ORAL at 11:50

## 2022-04-28 RX ADMIN — GABAPENTIN 100 MILLIGRAM(S): 400 CAPSULE ORAL at 06:01

## 2022-04-28 RX ADMIN — Medication 60 MILLIGRAM(S): at 06:00

## 2022-04-28 RX ADMIN — PANTOPRAZOLE SODIUM 40 MILLIGRAM(S): 20 TABLET, DELAYED RELEASE ORAL at 06:01

## 2022-04-28 RX ADMIN — GABAPENTIN 100 MILLIGRAM(S): 400 CAPSULE ORAL at 21:44

## 2022-04-28 RX ADMIN — SIMETHICONE 80 MILLIGRAM(S): 80 TABLET, CHEWABLE ORAL at 18:00

## 2022-04-28 RX ADMIN — TAMSULOSIN HYDROCHLORIDE 0.4 MILLIGRAM(S): 0.4 CAPSULE ORAL at 21:43

## 2022-04-28 RX ADMIN — ENOXAPARIN SODIUM 40 MILLIGRAM(S): 100 INJECTION SUBCUTANEOUS at 21:44

## 2022-04-28 RX ADMIN — GABAPENTIN 100 MILLIGRAM(S): 400 CAPSULE ORAL at 14:36

## 2022-04-28 RX ADMIN — SENNA PLUS 1 TABLET(S): 8.6 TABLET ORAL at 21:44

## 2022-04-28 NOTE — PROGRESS NOTE ADULT - PROBLEM SELECTOR PLAN 5
Patient initially to [facility] with urosepsis c/b urinary retention, likely 2/2 neurological process. Nation placed at [facility].   -continue with flomax 0.4 QHS   -neuro workup as above  - patient nation'ed on 4/22 due to retention   - Will do another trial of void prior to discharge - likely monday (1 week after prior TOV), continue nation for now .    #Complicated UTI   RESOLVED   s/p CTX  - UClx growing >100K ESBL E. Coli, completed Ertapenem 1g q24h x5days (4/20-4/24)

## 2022-04-28 NOTE — PROGRESS NOTE ADULT - PROBLEM SELECTOR PLAN 1
Hx diarrhea two weeks prior to acute LE weakness, imaging consistent with transverse myelitis. Elevated inflammatory markers. Lumbar tap done at [facility]-for which studies pending. s/p x3d high dose steroids with minimal improvement in symptoms.   - s/p IVIG 0.65g/kg x3 days, HD cath placed 4/19 for PLEX starting 4/20 x5 sessions(heme/onc following)  - MR head and CSpine w/wout - faint T2 hyperintensity in posterior lower cervical cord without definite enhancement   - NMO and anti-MOG antibodies negative  - Autoimmune myelopathy panel sent 4/20 6pm  - PT started PLEX therapy 4/20, to be done every other day, for 7 total sessions. Fourth session done on 4/26. Fifth session on 4/28  - transitioned to 60mg PO prednisone daily on 4/24 - decrease by 10mg every week  - to be discharged to acute rehab once treatment is complete  - Will do another trial of void prior to discharge - likely monday (1 week after prior TOV), continue nation for now .

## 2022-04-28 NOTE — PROGRESS NOTE ADULT - SUBJECTIVE AND OBJECTIVE BOX
LENGTH OF HOSPITAL STAY: 13d    SUBJECTIVE: No acute overnight events    HISTORY OF PRESENTING ILLNESS:   71 M with no past medical history, not on any medications, transferred here from [facility] for bilateral lower extremity weakness, with concern for Transverse Myelitis vs Guillan [place], for plasma exchange. Patient's story dates back to week and a half ago, when he went to [place] for a vacation, developed nausea, vomiting, diarrhea and weakness, accompanied by abd distension and difficulty urinating for which he went to [facility]. There, he was found to be in urosepsis, complicated by urinary obstruction for which Marie Catheter was placed. On day prior to planned discharge 4/12, he started to develop severe paresthesia, numbness, and weakness of bilateral lower extremities. He underwent abdominal imaging and MRI of spine at [facility], and was found to have pancreatic lesion at pancreatic head, and MRI thoracic spine consistent with transverse myelitis. He then was given high dose steroids for three days, which provided minimal improvement of symptoms. As a result, patient transferred to [facility] for possible plasma exchange.   Of note-patient travelled to [place] and [place] in November, for which he received a series of immunizations that he does not recall.     Currently, he is endorsing weakness, tingling, and numbness of his bilateral lower extremities up until the point of his umbilicus. He is denying any other symptoms of his UE, notes no weakness, paresthesias, tingling. Currently only complaint is abdominal pain and constipation.      Relevant imaging/studies from [facility]:  CT AP: Ill defined low density lesion within pancreatic head represent a benign or malignant neoplasm. Measures 2.2 x 2.4 x 3.1 cm which may represent a benign or malignant neoplasm, pancreatic lesion partially compresses the portal vein and superior aspect of SMV. Bowel: Mild diffuse wall thickening of the ascending transverse and upper half of the descending colon which may relate to possible mild non specific colitis.   MRI Thoracic:  Borderline abn signal within the central cord substance throughout entire length of thoracic spine could reflect transverse myelitis.   ESR- 30 CRP- 14.2  CSF total protein 80, CSF PCR negative, others pending (15 Apr 2022 20:38)    ALLERGIES:  No Known Allergies    MEDICATIONS:  STANDING MEDICATIONS  albumin human  5% IVPB 3250 milliLiter(s) IV Intermittent once  calcium gluconate IVPB 2 Gram(s) IV Intermittent once  chlorhexidine 2% Cloths 1 Application(s) Topical <User Schedule>  cyanocobalamin 1000 MICROGram(s) Oral daily  enoxaparin Injectable 40 milliGRAM(s) SubCutaneous every 24 hours  gabapentin 100 milliGRAM(s) Oral three times a day  heparin  Lock Flush 100 Units/mL Injectable 600 Unit(s) IV Push once  lactulose Syrup 10 Gram(s) Oral every 24 hours  melatonin 3 milliGRAM(s) Oral at bedtime  pantoprazole    Tablet 40 milliGRAM(s) Oral two times a day  polyethylene glycol 3350 17 Gram(s) Oral daily  predniSONE   Tablet 60 milliGRAM(s) Oral every 24 hours  senna 1 Tablet(s) Oral at bedtime  simethicone 80 milliGRAM(s) Chew two times a day  tamsulosin 0.4 milliGRAM(s) Oral at bedtime    PRN MEDICATIONS  acetaminophen     Tablet .. 650 milliGRAM(s) Oral every 6 hours PRN  sodium chloride 0.9% lock flush 10 milliLiter(s) IV Push every 1 hour PRN    VITALS:   T(F): 98.6  HR: 69  BP: 120/65  RR: 18  SpO2: 94%    LABS:                        11.1   10.30 )-----------( 204      ( 28 Apr 2022 07:28 )             34.3     04-28    139  |  104  |  17  ----------------------------<  91  3.8   |  25  |  0.94    Ca    8.1<L>      28 Apr 2022 07:28  Phos  3.4     04-28  Mg     2.1     04-28    TPro  4.6<L>  /  Alb  3.7  /  TBili  0.5  /  DBili  x   /  AST  23  /  ALT  35  /  AlkPhos  31<L>  04-28    PT/INR - ( 28 Apr 2022 07:29 )   PT: 12.0 sec;   INR: 1.01       PTT - ( 28 Apr 2022 07:29 )  PTT:25.9 sec    RADIOLOGY:  Reviewed    PHYSICAL EXAM:  GEN: No acute distress  HEENT: NCAT  LUNGS: Clear to auscultation bilaterally   HEART: S1/S2 present. RRR.   ABD: Soft, non-tender, non-distended. Bowel sounds present  EXT: 2-3/5   NEURO: AAOX3

## 2022-04-28 NOTE — PROGRESS NOTE ADULT - ATTENDING COMMENTS
Leg strength continues to improve gradually   Will do 7 treatments of plasmapheresis total  continue other meds including prednisone taper   dispo to acute rehab after treatment

## 2022-04-28 NOTE — PROGRESS NOTE ADULT - PROBLEM SELECTOR PLAN 3
RESOLVED. Presented with WBC of 16, elevated at [facility] as well. Could be 2/2 infection-history of UTI at [facility] treated with 5 day course of CTX, denying any complaints. No cough, no persistent GI symptoms, no diarrhea, no vomiting. Could be 2/2 reactive process given neurological symptomology. Could also be 2/2 prednisone.   - UA positive, nation exchanged   - CXR at [facility] clear without infiltrates  - Patient currently constipated-unlikely to be GI source.

## 2022-04-28 NOTE — PROGRESS NOTE ADULT - PROBLEM SELECTOR PLAN 2
CT AP 2.3 x 2.4 x 3.1 at pancreatic head, as per records, workup initiated at [facility]; however, unsure of results.  - Ca-19-9 and CEA negative at [facility]   - MR abdomen/pelvis w cont - pancreatic mass indeterminate etiology    - f/u GI recs  - per GI, EGD/EUS procedure to be performed after plasmapheresis and acute neurologic issues resolved. Will defer to outpatient as elective procedure.

## 2022-04-28 NOTE — PROGRESS NOTE ADULT - ASSESSMENT
70 yo M with no significant PMHx, transferred from [facility] for bilateral lower extremity weakness, with working diagnosis of thoracic transverse myelitis, s/p lumbar puncture at [facility], failed high-dose steroids and expected to have minimal-no response to IVIG per Neurology. Incidentally found ot have a cystic pancreatic head lesion (2.2 x 2.4 x 3.1 cm), suspected IPMN, with partial compression of portal vein and superior aspect of SMV with non-specific colitis of ascending transverse and upper half of descending colon. MRCP (04/16/22) showed a complex cystic pancreatic head lesion, either IPMN or atypical serous cystadenoma, with recommendation for EUS and biopsy sampling. Hematology consulted for initiation of PLEX on 04/20/22 per Neurology.    #) DIAGNOSIS  - Steroid- and IVIG-refractory thoracic transverse myelitis  - Mild hypofibrinogenemia 2/2 PLEX  - Complex cystic pancreatic head mass     #) PLAN  - Per Neurology, planning for 7 sessions of PLEX starting on 04/20/22 via HD catheter (completed 4 of 7 sessions). Pending for 5th session today  - Recheck fibrinogen and PT/PTT on 04/30/22 in AM prior to 5th treatment.   - Will need to trend CBC with differential, CMP once daily.  - Follow-up with GI for eventual biopsy of the pancreatic head lesion.   - Maintain active T+S, transfuse if Hb < 7 or if actively bleeding     Discussed with Dr. Anne

## 2022-04-28 NOTE — PROGRESS NOTE ADULT - ASSESSMENT
71 M with no past medical history, not on any medications, transferred here from [facility] for bilateral lower extremity weakness, with concern for Transverse Myelitis

## 2022-04-28 NOTE — PROGRESS NOTE ADULT - SUBJECTIVE AND OBJECTIVE BOX
MARCIAL ROMANO, 71y, Male  MRN-[id number]  Patient is a 71y old  Male who presents with a chief complaint of transverse myelitis (27 Apr 2022 14:21)      OVERNIGHT EVENTS: naeo    SUBJECTIVE: Patient seen and examined at bedside. Reports improved strength. Denies fevers, chills, HA, chest pain, sob, nausea, vomiting, abdominal pain, diarrhea, constipation, dysuria.     12 Point ROS Negative unless noted otherwise above.  -------------------------------------------------------------------------------  VITAL SIGNS:  Vital Signs Last 24 Hrs  T(C): 37 (28 Apr 2022 06:25), Max: 37 (27 Apr 2022 20:30)  T(F): 98.6 (28 Apr 2022 06:25), Max: 98.6 (27 Apr 2022 20:30)  HR: 69 (28 Apr 2022 06:25) (69 - 73)  BP: 120/65 (28 Apr 2022 06:25) (107/62 - 128/72)  BP(mean): --  RR: 18 (28 Apr 2022 06:25) (17 - 18)  SpO2: 94% (28 Apr 2022 06:25) (94% - 97%)  I&O's Summary    27 Apr 2022 07:01  -  28 Apr 2022 07:00  --------------------------------------------------------  IN: 0 mL / OUT: 1800 mL / NET: -1800 mL        PHYSICAL EXAM:    General: NAD, well developed  HEENT: NC/AT; EOMI, PERRLA, anicteric sclera; moist mucosal membranes.  Neck: supple, trachea midline  Cardiovascular: RRR, +S1/S2; NO M/R/G  Respiratory: CTA B/L; no W/R/R  Gastrointestinal: soft, NT/ND; +BSx4  Extremities: WWP; no edema or cyanosis  Vascular: 2+ radial, DP/PT pulses B/L  Neurological: AAOx3; able to move feet and wiggle toes; slight ability to raise leg against gravity; numbness from umbilicus downwards       ALLERGIES:  Allergies    No Known Allergies    Intolerances        MEDICATIONS:  MEDICATIONS  (STANDING):  albumin human  5% IVPB 3250 milliLiter(s) IV Intermittent once  calcium gluconate IVPB 2 Gram(s) IV Intermittent once  chlorhexidine 2% Cloths 1 Application(s) Topical <User Schedule>  cyanocobalamin 1000 MICROGram(s) Oral daily  enoxaparin Injectable 40 milliGRAM(s) SubCutaneous every 24 hours  gabapentin 100 milliGRAM(s) Oral three times a day  heparin  Lock Flush 100 Units/mL Injectable 600 Unit(s) IV Push once  lactulose Syrup 10 Gram(s) Oral every 24 hours  melatonin 3 milliGRAM(s) Oral at bedtime  pantoprazole    Tablet 40 milliGRAM(s) Oral two times a day  polyethylene glycol 3350 17 Gram(s) Oral daily  predniSONE   Tablet 60 milliGRAM(s) Oral every 24 hours  senna 1 Tablet(s) Oral at bedtime  simethicone 80 milliGRAM(s) Chew two times a day  tamsulosin 0.4 milliGRAM(s) Oral at bedtime    MEDICATIONS  (PRN):  acetaminophen     Tablet .. 650 milliGRAM(s) Oral every 6 hours PRN Temp greater or equal to 38C (100.4F), Mild Pain (1 - 3)  sodium chloride 0.9% lock flush 10 milliLiter(s) IV Push every 1 hour PRN Pre/post blood products, medications, blood draw, and to maintain line patency      -------------------------------------------------------------------------------  LABS:                        11.1   10.30 )-----------( 204      ( 28 Apr 2022 07:28 )             34.3     04-28    139  |  104  |  17  ----------------------------<  91  3.8   |  25  |  0.94    Ca    8.1<L>      28 Apr 2022 07:28  Phos  3.4     04-28  Mg     2.1     04-28    TPro  4.6<L>  /  Alb  3.7  /  TBili  0.5  /  DBili  x   /  AST  23  /  ALT  35  /  AlkPhos  31<L>  04-28    LIVER FUNCTIONS - ( 28 Apr 2022 07:28 )  Alb: 3.7 g/dL / Pro: 4.6 g/dL / ALK PHOS: 31 U/L / ALT: 35 U/L / AST: 23 U/L / GGT: x           PT/INR - ( 28 Apr 2022 07:29 )   PT: 12.0 sec;   INR: 1.01          PTT - ( 28 Apr 2022 07:29 )  PTT:25.9 sec    CAPILLARY BLOOD GLUCOSE          COVID-19 PCR: NotDetec (28 Apr 2022 08:54)  COVID-19 PCR: NotDetec (21 Apr 2022 21:40)      RADIOLOGY & ADDITIONAL TESTS: Reviewed.     SUBJECTIVE: Patient seen and examined at bedside. Reports improved strength. Denies fevers, chills, HA, chest pain, sob, nausea, vomiting, abdominal pain, diarrhea, constipation, dysuria.     12 Point ROS Negative unless noted otherwise above.  -------------------------------------------------------------------------------  VITAL SIGNS:  Vital Signs Last 24 Hrs  T(C): 37 (28 Apr 2022 06:25), Max: 37 (27 Apr 2022 20:30)  T(F): 98.6 (28 Apr 2022 06:25), Max: 98.6 (27 Apr 2022 20:30)  HR: 69 (28 Apr 2022 06:25) (69 - 73)  BP: 120/65 (28 Apr 2022 06:25) (107/62 - 128/72)  BP(mean): --  RR: 18 (28 Apr 2022 06:25) (17 - 18)  SpO2: 94% (28 Apr 2022 06:25) (94% - 97%)  I&O's Summary    27 Apr 2022 07:01  -  28 Apr 2022 07:00  --------------------------------------------------------  IN: 0 mL / OUT: 1800 mL / NET: -1800 mL        PHYSICAL EXAM:    General: NAD, well developed  HEENT: NC/AT; EOMI, PERRLA, anicteric sclera; moist mucosal membranes.  Neck: supple, trachea midline  Cardiovascular: RRR, +S1/S2; NO M/R/G  Respiratory: CTA B/L; no W/R/R  Gastrointestinal: soft, NT/ND; +BSx4  Extremities: WWP; no edema or cyanosis  Vascular: 2+ radial, DP/PT pulses B/L  Neurological: AAOx3; able to move feet and wiggle toes; slight ability to raise leg against gravity; numbness from umbilicus downwards       ALLERGIES:  Allergies    No Known Allergies    Intolerances        MEDICATIONS:  MEDICATIONS  (STANDING):  albumin human  5% IVPB 3250 milliLiter(s) IV Intermittent once  calcium gluconate IVPB 2 Gram(s) IV Intermittent once  chlorhexidine 2% Cloths 1 Application(s) Topical <User Schedule>  cyanocobalamin 1000 MICROGram(s) Oral daily  enoxaparin Injectable 40 milliGRAM(s) SubCutaneous every 24 hours  gabapentin 100 milliGRAM(s) Oral three times a day  heparin  Lock Flush 100 Units/mL Injectable 600 Unit(s) IV Push once  lactulose Syrup 10 Gram(s) Oral every 24 hours  melatonin 3 milliGRAM(s) Oral at bedtime  pantoprazole    Tablet 40 milliGRAM(s) Oral two times a day  polyethylene glycol 3350 17 Gram(s) Oral daily  predniSONE   Tablet 60 milliGRAM(s) Oral every 24 hours  senna 1 Tablet(s) Oral at bedtime  simethicone 80 milliGRAM(s) Chew two times a day  tamsulosin 0.4 milliGRAM(s) Oral at bedtime    MEDICATIONS  (PRN):  acetaminophen     Tablet .. 650 milliGRAM(s) Oral every 6 hours PRN Temp greater or equal to 38C (100.4F), Mild Pain (1 - 3)  sodium chloride 0.9% lock flush 10 milliLiter(s) IV Push every 1 hour PRN Pre/post blood products, medications, blood draw, and to maintain line patency      -------------------------------------------------------------------------------  LABS:                        11.1   10.30 )-----------( 204      ( 28 Apr 2022 07:28 )             34.3     04-28    139  |  104  |  17  ----------------------------<  91  3.8   |  25  |  0.94    Ca    8.1<L>      28 Apr 2022 07:28  Phos  3.4     04-28  Mg     2.1     04-28    TPro  4.6<L>  /  Alb  3.7  /  TBili  0.5  /  DBili  x   /  AST  23  /  ALT  35  /  AlkPhos  31<L>  04-28    LIVER FUNCTIONS - ( 28 Apr 2022 07:28 )  Alb: 3.7 g/dL / Pro: 4.6 g/dL / ALK PHOS: 31 U/L / ALT: 35 U/L / AST: 23 U/L / GGT: x           PT/INR - ( 28 Apr 2022 07:29 )   PT: 12.0 sec;   INR: 1.01          PTT - ( 28 Apr 2022 07:29 )  PTT:25.9 sec    CAPILLARY BLOOD GLUCOSE          COVID-19 PCR: NotDetec (28 Apr 2022 08:54)  COVID-19 PCR: NotDetec (21 Apr 2022 21:40)      RADIOLOGY & ADDITIONAL TESTS: Reviewed.

## 2022-04-29 LAB
ALBUMIN SERPL ELPH-MCNC: 4.1 G/DL — SIGNIFICANT CHANGE UP (ref 3.3–5)
ALP SERPL-CCNC: 18 U/L — LOW (ref 40–120)
ALT FLD-CCNC: 23 U/L — SIGNIFICANT CHANGE UP (ref 10–45)
ANION GAP SERPL CALC-SCNC: 10 MMOL/L — SIGNIFICANT CHANGE UP (ref 5–17)
APTT BLD: 36.3 SEC — HIGH (ref 27.5–35.5)
AST SERPL-CCNC: 18 U/L — SIGNIFICANT CHANGE UP (ref 10–40)
BASOPHILS # BLD AUTO: 0 K/UL — SIGNIFICANT CHANGE UP (ref 0–0.2)
BASOPHILS NFR BLD AUTO: 0 % — SIGNIFICANT CHANGE UP (ref 0–2)
BILIRUB SERPL-MCNC: 0.5 MG/DL — SIGNIFICANT CHANGE UP (ref 0.2–1.2)
BUN SERPL-MCNC: 20 MG/DL — SIGNIFICANT CHANGE UP (ref 7–23)
CALCIUM SERPL-MCNC: 8.2 MG/DL — LOW (ref 8.4–10.5)
CHLORIDE SERPL-SCNC: 105 MMOL/L — SIGNIFICANT CHANGE UP (ref 96–108)
CO2 SERPL-SCNC: 24 MMOL/L — SIGNIFICANT CHANGE UP (ref 22–31)
CREAT SERPL-MCNC: 1.14 MG/DL — SIGNIFICANT CHANGE UP (ref 0.5–1.3)
EGFR: 69 ML/MIN/1.73M2 — SIGNIFICANT CHANGE UP
EOSINOPHIL # BLD AUTO: 0 K/UL — SIGNIFICANT CHANGE UP (ref 0–0.5)
EOSINOPHIL NFR BLD AUTO: 0 % — SIGNIFICANT CHANGE UP (ref 0–6)
FIBRINOGEN PPP-MCNC: 94 MG/DL — CRITICAL LOW (ref 258–438)
GIANT PLATELETS BLD QL SMEAR: PRESENT — SIGNIFICANT CHANGE UP
GLUCOSE SERPL-MCNC: 92 MG/DL — SIGNIFICANT CHANGE UP (ref 70–99)
HCT VFR BLD CALC: 34.5 % — LOW (ref 39–50)
HGB BLD-MCNC: 11.1 G/DL — LOW (ref 13–17)
INR BLD: 1.26 — HIGH (ref 0.88–1.16)
LYMPHOCYTES # BLD AUTO: 2.21 K/UL — SIGNIFICANT CHANGE UP (ref 1–3.3)
LYMPHOCYTES # BLD AUTO: 20.2 % — SIGNIFICANT CHANGE UP (ref 13–44)
MAGNESIUM SERPL-MCNC: 2 MG/DL — SIGNIFICANT CHANGE UP (ref 1.6–2.6)
MANUAL SMEAR VERIFICATION: SIGNIFICANT CHANGE UP
MCHC RBC-ENTMCNC: 28.8 PG — SIGNIFICANT CHANGE UP (ref 27–34)
MCHC RBC-ENTMCNC: 32.2 GM/DL — SIGNIFICANT CHANGE UP (ref 32–36)
MCV RBC AUTO: 89.6 FL — SIGNIFICANT CHANGE UP (ref 80–100)
MENADIONE SERPL-MCNC: 0.17 NG/ML — SIGNIFICANT CHANGE UP (ref 0.1–2.2)
MONOCYTES # BLD AUTO: 0.19 K/UL — SIGNIFICANT CHANGE UP (ref 0–0.9)
MONOCYTES NFR BLD AUTO: 1.7 % — LOW (ref 2–14)
MYELOCYTES NFR BLD: 0.9 % — HIGH (ref 0–0)
NEUTROPHILS # BLD AUTO: 8.44 K/UL — HIGH (ref 1.8–7.4)
NEUTROPHILS NFR BLD AUTO: 77.2 % — HIGH (ref 43–77)
OVALOCYTES BLD QL SMEAR: SLIGHT — SIGNIFICANT CHANGE UP
PHOSPHATE SERPL-MCNC: 3.5 MG/DL — SIGNIFICANT CHANGE UP (ref 2.5–4.5)
PLAT MORPH BLD: NORMAL — SIGNIFICANT CHANGE UP
PLATELET # BLD AUTO: 217 K/UL — SIGNIFICANT CHANGE UP (ref 150–400)
POIKILOCYTOSIS BLD QL AUTO: SLIGHT — SIGNIFICANT CHANGE UP
POTASSIUM SERPL-MCNC: 4.1 MMOL/L — SIGNIFICANT CHANGE UP (ref 3.5–5.3)
POTASSIUM SERPL-SCNC: 4.1 MMOL/L — SIGNIFICANT CHANGE UP (ref 3.5–5.3)
PROT SERPL-MCNC: 4.6 G/DL — LOW (ref 6–8.3)
PROTHROM AB SERPL-ACNC: 15 SEC — HIGH (ref 10.5–13.4)
RBC # BLD: 3.85 M/UL — LOW (ref 4.2–5.8)
RBC # FLD: 14 % — SIGNIFICANT CHANGE UP (ref 10.3–14.5)
RBC BLD AUTO: ABNORMAL
SODIUM SERPL-SCNC: 139 MMOL/L — SIGNIFICANT CHANGE UP (ref 135–145)
SPHEROCYTES BLD QL SMEAR: SLIGHT — SIGNIFICANT CHANGE UP
WBC # BLD: 10.93 K/UL — HIGH (ref 3.8–10.5)
WBC # FLD AUTO: 10.93 K/UL — HIGH (ref 3.8–10.5)

## 2022-04-29 PROCEDURE — 99232 SBSQ HOSP IP/OBS MODERATE 35: CPT

## 2022-04-29 RX ORDER — CALCIUM GLUCONATE 100 MG/ML
2 VIAL (ML) INTRAVENOUS ONCE
Refills: 0 | Status: DISCONTINUED | OUTPATIENT
Start: 2022-04-30 | End: 2022-04-30

## 2022-04-29 RX ORDER — ALBUMIN HUMAN 25 %
3250 VIAL (ML) INTRAVENOUS ONCE
Refills: 0 | Status: DISCONTINUED | OUTPATIENT
Start: 2022-04-30 | End: 2022-04-30

## 2022-04-29 RX ADMIN — LACTULOSE 10 GRAM(S): 10 SOLUTION ORAL at 12:03

## 2022-04-29 RX ADMIN — SENNA PLUS 1 TABLET(S): 8.6 TABLET ORAL at 21:21

## 2022-04-29 RX ADMIN — PREGABALIN 1000 MICROGRAM(S): 225 CAPSULE ORAL at 12:03

## 2022-04-29 RX ADMIN — Medication 60 MILLIGRAM(S): at 05:57

## 2022-04-29 RX ADMIN — GABAPENTIN 100 MILLIGRAM(S): 400 CAPSULE ORAL at 21:21

## 2022-04-29 RX ADMIN — CHLORHEXIDINE GLUCONATE 1 APPLICATION(S): 213 SOLUTION TOPICAL at 06:09

## 2022-04-29 RX ADMIN — PANTOPRAZOLE SODIUM 40 MILLIGRAM(S): 20 TABLET, DELAYED RELEASE ORAL at 05:57

## 2022-04-29 RX ADMIN — SIMETHICONE 80 MILLIGRAM(S): 80 TABLET, CHEWABLE ORAL at 18:19

## 2022-04-29 RX ADMIN — Medication 3 MILLIGRAM(S): at 21:21

## 2022-04-29 RX ADMIN — SIMETHICONE 80 MILLIGRAM(S): 80 TABLET, CHEWABLE ORAL at 05:56

## 2022-04-29 RX ADMIN — GABAPENTIN 100 MILLIGRAM(S): 400 CAPSULE ORAL at 05:57

## 2022-04-29 RX ADMIN — PANTOPRAZOLE SODIUM 40 MILLIGRAM(S): 20 TABLET, DELAYED RELEASE ORAL at 18:18

## 2022-04-29 RX ADMIN — GABAPENTIN 100 MILLIGRAM(S): 400 CAPSULE ORAL at 15:09

## 2022-04-29 RX ADMIN — TAMSULOSIN HYDROCHLORIDE 0.4 MILLIGRAM(S): 0.4 CAPSULE ORAL at 21:21

## 2022-04-29 RX ADMIN — ENOXAPARIN SODIUM 40 MILLIGRAM(S): 100 INJECTION SUBCUTANEOUS at 21:21

## 2022-04-29 NOTE — PROGRESS NOTE ADULT - SUBJECTIVE AND OBJECTIVE BOX
MARCIAL ROMANO, 71y, Male  MRN-[id number]  Patient is a 71y old  Male who presents with a chief complaint of transverse myelitis (27 Apr 2022 14:21)      OVERNIGHT EVENTS: naeo    SUBJECTIVE:    12 Point ROS Negative unless noted otherwise above.  -------------------------------------------------------------------------------  VITAL SIGNS:  Vital Signs Last 24 Hrs  T(C): 37.2 (29 Apr 2022 05:42), Max: 37.2 (29 Apr 2022 05:42)  T(F): 98.9 (29 Apr 2022 05:42), Max: 98.9 (29 Apr 2022 05:42)  HR: 67 (29 Apr 2022 05:42) (67 - 72)  BP: 118/67 (29 Apr 2022 05:42) (118/64 - 118/67)  BP(mean): --  RR: 18 (29 Apr 2022 05:42) (18 - 18)  SpO2: 94% (29 Apr 2022 05:42) (94% - 95%)  I&O's Summary    28 Apr 2022 07:01  -  29 Apr 2022 07:00  --------------------------------------------------------  IN: 0 mL / OUT: 1600 mL / NET: -1600 mL        PHYSICAL EXAM:    General: NAD, well developed  HEENT: NC/AT; EOMI, PERRLA, anicteric sclera; moist mucosal membranes.  Neck: supple, trachea midline  Cardiovascular: RRR, +S1/S2; NO M/R/G  Respiratory: CTA B/L; no W/R/R  Gastrointestinal: soft, NT/ND; +BSx4  Extremities: WWP; no edema or cyanosis  Vascular: 2+ radial, DP/PT pulses B/L  Neurological: AAOx3; no focal deficits    ALLERGIES:  Allergies    No Known Allergies    Intolerances        MEDICATIONS:  MEDICATIONS  (STANDING):  albumin human  5% IVPB 3250 milliLiter(s) IV Intermittent once  calcium gluconate IVPB 2 Gram(s) IV Intermittent once  chlorhexidine 2% Cloths 1 Application(s) Topical <User Schedule>  cyanocobalamin 1000 MICROGram(s) Oral daily  enoxaparin Injectable 40 milliGRAM(s) SubCutaneous every 24 hours  gabapentin 100 milliGRAM(s) Oral three times a day  heparin  Lock Flush 100 Units/mL Injectable 600 Unit(s) IV Push once  lactulose Syrup 10 Gram(s) Oral every 24 hours  melatonin 3 milliGRAM(s) Oral at bedtime  pantoprazole    Tablet 40 milliGRAM(s) Oral two times a day  polyethylene glycol 3350 17 Gram(s) Oral daily  predniSONE   Tablet 60 milliGRAM(s) Oral every 24 hours  senna 1 Tablet(s) Oral at bedtime  simethicone 80 milliGRAM(s) Chew two times a day  tamsulosin 0.4 milliGRAM(s) Oral at bedtime    MEDICATIONS  (PRN):  acetaminophen     Tablet .. 650 milliGRAM(s) Oral every 6 hours PRN Temp greater or equal to 38C (100.4F), Mild Pain (1 - 3)  sodium chloride 0.9% lock flush 10 milliLiter(s) IV Push every 1 hour PRN Pre/post blood products, medications, blood draw, and to maintain line patency      -------------------------------------------------------------------------------  LABS:                        11.1   10.93 )-----------( 217      ( 29 Apr 2022 06:45 )             34.5     04-29    139  |  105  |  20  ----------------------------<  92  4.1   |  24  |  1.14    Ca    8.2<L>      29 Apr 2022 06:46  Phos  3.5     04-29  Mg     2.0     04-29    TPro  4.6<L>  /  Alb  4.1  /  TBili  0.5  /  DBili  x   /  AST  18  /  ALT  23  /  AlkPhos  18<L>  04-29    LIVER FUNCTIONS - ( 29 Apr 2022 06:46 )  Alb: 4.1 g/dL / Pro: 4.6 g/dL / ALK PHOS: 18 U/L / ALT: 23 U/L / AST: 18 U/L / GGT: x           PT/INR - ( 29 Apr 2022 06:45 )   PT: 15.0 sec;   INR: 1.26          PTT - ( 29 Apr 2022 06:45 )  PTT:36.3 sec    CAPILLARY BLOOD GLUCOSE          COVID-19 PCR: NotDetec (28 Apr 2022 08:54)  COVID-19 PCR: NotDetec (21 Apr 2022 21:40)      RADIOLOGY & ADDITIONAL TESTS: Reviewed.       MARCIAL ROMANO, 71y, Male  MRN-[id number]  Patient is a 71y old  Male who presents with a chief complaint of transverse myelitis (27 Apr 2022 14:21)      OVERNIGHT EVENTS: naeo    SUBJECTIVE: Patient seen and examined at bedside. Reports feeling fine. Denies fevers, chills, HA, chest pain, sob, nausea, vomiting, abdominal pain, diarrhea, constipation, dysuria.     12 Point ROS Negative unless noted otherwise above.  -------------------------------------------------------------------------------  VITAL SIGNS:  Vital Signs Last 24 Hrs  T(C): 37.2 (29 Apr 2022 05:42), Max: 37.2 (29 Apr 2022 05:42)  T(F): 98.9 (29 Apr 2022 05:42), Max: 98.9 (29 Apr 2022 05:42)  HR: 67 (29 Apr 2022 05:42) (67 - 72)  BP: 118/67 (29 Apr 2022 05:42) (118/64 - 118/67)  BP(mean): --  RR: 18 (29 Apr 2022 05:42) (18 - 18)  SpO2: 94% (29 Apr 2022 05:42) (94% - 95%)  I&O's Summary    28 Apr 2022 07:01  -  29 Apr 2022 07:00  --------------------------------------------------------  IN: 0 mL / OUT: 1600 mL / NET: -1600 mL        PHYSICAL EXAM:    General: NAD, well developed  HEENT: NC/AT; EOMI, PERRLA, anicteric sclera; moist mucosal membranes.  Neck: supple, trachea midline  Cardiovascular: RRR, +S1/S2; NO M/R/G  Respiratory: CTA B/L; no W/R/R  Gastrointestinal: soft, NT/ND; +BSx4  Extremities: WWP; no edema or cyanosis  Vascular: 2+ radial, DP/PT pulses B/L  Neurological: AAOx3; able to move feet and wiggle toes; slight ability to raise leg against gravity; numbness from umbilicus downwards     ALLERGIES:  Allergies    No Known Allergies    Intolerances        MEDICATIONS:  MEDICATIONS  (STANDING):  albumin human  5% IVPB 3250 milliLiter(s) IV Intermittent once  calcium gluconate IVPB 2 Gram(s) IV Intermittent once  chlorhexidine 2% Cloths 1 Application(s) Topical <User Schedule>  cyanocobalamin 1000 MICROGram(s) Oral daily  enoxaparin Injectable 40 milliGRAM(s) SubCutaneous every 24 hours  gabapentin 100 milliGRAM(s) Oral three times a day  heparin  Lock Flush 100 Units/mL Injectable 600 Unit(s) IV Push once  lactulose Syrup 10 Gram(s) Oral every 24 hours  melatonin 3 milliGRAM(s) Oral at bedtime  pantoprazole    Tablet 40 milliGRAM(s) Oral two times a day  polyethylene glycol 3350 17 Gram(s) Oral daily  predniSONE   Tablet 60 milliGRAM(s) Oral every 24 hours  senna 1 Tablet(s) Oral at bedtime  simethicone 80 milliGRAM(s) Chew two times a day  tamsulosin 0.4 milliGRAM(s) Oral at bedtime    MEDICATIONS  (PRN):  acetaminophen     Tablet .. 650 milliGRAM(s) Oral every 6 hours PRN Temp greater or equal to 38C (100.4F), Mild Pain (1 - 3)  sodium chloride 0.9% lock flush 10 milliLiter(s) IV Push every 1 hour PRN Pre/post blood products, medications, blood draw, and to maintain line patency      -------------------------------------------------------------------------------  LABS:                        11.1   10.93 )-----------( 217      ( 29 Apr 2022 06:45 )             34.5     04-29    139  |  105  |  20  ----------------------------<  92  4.1   |  24  |  1.14    Ca    8.2<L>      29 Apr 2022 06:46  Phos  3.5     04-29  Mg     2.0     04-29    TPro  4.6<L>  /  Alb  4.1  /  TBili  0.5  /  DBili  x   /  AST  18  /  ALT  23  /  AlkPhos  18<L>  04-29    LIVER FUNCTIONS - ( 29 Apr 2022 06:46 )  Alb: 4.1 g/dL / Pro: 4.6 g/dL / ALK PHOS: 18 U/L / ALT: 23 U/L / AST: 18 U/L / GGT: x           PT/INR - ( 29 Apr 2022 06:45 )   PT: 15.0 sec;   INR: 1.26          PTT - ( 29 Apr 2022 06:45 )  PTT:36.3 sec    CAPILLARY BLOOD GLUCOSE          COVID-19 PCR: NotDetec (28 Apr 2022 08:54)  COVID-19 PCR: NotDetec (21 Apr 2022 21:40)      RADIOLOGY & ADDITIONAL TESTS: Reviewed.       MARCIAL ROMANO, 71y, Male  MRN-[id number]  Patient is a 71y old  Male who presents with a chief complaint of transverse myelitis (27 Apr 2022 14:21)      OVERNIGHT EVENTS: naeo    SUBJECTIVE: Patient seen and examined at bedside. Reports improvement. Denies fevers, chills, HA, chest pain, sob, nausea, vomiting, abdominal pain, diarrhea, constipation, dysuria.     12 Point ROS Negative unless noted otherwise above.  -------------------------------------------------------------------------------  VITAL SIGNS:  Vital Signs Last 24 Hrs  T(C): 37.2 (29 Apr 2022 05:42), Max: 37.2 (29 Apr 2022 05:42)  T(F): 98.9 (29 Apr 2022 05:42), Max: 98.9 (29 Apr 2022 05:42)  HR: 67 (29 Apr 2022 05:42) (67 - 72)  BP: 118/67 (29 Apr 2022 05:42) (118/64 - 118/67)  BP(mean): --  RR: 18 (29 Apr 2022 05:42) (18 - 18)  SpO2: 94% (29 Apr 2022 05:42) (94% - 95%)  I&O's Summary    28 Apr 2022 07:01  -  29 Apr 2022 07:00  --------------------------------------------------------  IN: 0 mL / OUT: 1600 mL / NET: -1600 mL        PHYSICAL EXAM:    General: NAD, well developed  HEENT: NC/AT; EOMI, PERRLA, anicteric sclera; moist mucosal membranes.  Neck: supple, trachea midline  Cardiovascular: RRR, +S1/S2; NO M/R/G  Respiratory: CTA B/L; no W/R/R  Gastrointestinal: soft, NT/ND; +BSx4  Extremities: WWP; no edema or cyanosis  Vascular: 2+ radial, DP/PT pulses B/L  Neurological: AAOx3; able to move feet and wiggle toes; slight ability to raise leg against gravity; numbness from umbilicus downwards     ALLERGIES:  Allergies    No Known Allergies    Intolerances        MEDICATIONS:  MEDICATIONS  (STANDING):  albumin human  5% IVPB 3250 milliLiter(s) IV Intermittent once  calcium gluconate IVPB 2 Gram(s) IV Intermittent once  chlorhexidine 2% Cloths 1 Application(s) Topical <User Schedule>  cyanocobalamin 1000 MICROGram(s) Oral daily  enoxaparin Injectable 40 milliGRAM(s) SubCutaneous every 24 hours  gabapentin 100 milliGRAM(s) Oral three times a day  heparin  Lock Flush 100 Units/mL Injectable 600 Unit(s) IV Push once  lactulose Syrup 10 Gram(s) Oral every 24 hours  melatonin 3 milliGRAM(s) Oral at bedtime  pantoprazole    Tablet 40 milliGRAM(s) Oral two times a day  polyethylene glycol 3350 17 Gram(s) Oral daily  predniSONE   Tablet 60 milliGRAM(s) Oral every 24 hours  senna 1 Tablet(s) Oral at bedtime  simethicone 80 milliGRAM(s) Chew two times a day  tamsulosin 0.4 milliGRAM(s) Oral at bedtime    MEDICATIONS  (PRN):  acetaminophen     Tablet .. 650 milliGRAM(s) Oral every 6 hours PRN Temp greater or equal to 38C (100.4F), Mild Pain (1 - 3)  sodium chloride 0.9% lock flush 10 milliLiter(s) IV Push every 1 hour PRN Pre/post blood products, medications, blood draw, and to maintain line patency      -------------------------------------------------------------------------------  LABS:                        11.1   10.93 )-----------( 217      ( 29 Apr 2022 06:45 )             34.5     04-29    139  |  105  |  20  ----------------------------<  92  4.1   |  24  |  1.14    Ca    8.2<L>      29 Apr 2022 06:46  Phos  3.5     04-29  Mg     2.0     04-29    TPro  4.6<L>  /  Alb  4.1  /  TBili  0.5  /  DBili  x   /  AST  18  /  ALT  23  /  AlkPhos  18<L>  04-29    LIVER FUNCTIONS - ( 29 Apr 2022 06:46 )  Alb: 4.1 g/dL / Pro: 4.6 g/dL / ALK PHOS: 18 U/L / ALT: 23 U/L / AST: 18 U/L / GGT: x           PT/INR - ( 29 Apr 2022 06:45 )   PT: 15.0 sec;   INR: 1.26          PTT - ( 29 Apr 2022 06:45 )  PTT:36.3 sec    CAPILLARY BLOOD GLUCOSE          COVID-19 PCR: NotDetec (28 Apr 2022 08:54)  COVID-19 PCR: NotDetec (21 Apr 2022 21:40)      RADIOLOGY & ADDITIONAL TESTS: Reviewed.

## 2022-04-29 NOTE — PROGRESS NOTE ADULT - SUBJECTIVE AND OBJECTIVE BOX
Physical Medicine and Rehabilitation Progress Note:    Patient is a 71y old  Male who presents with a chief complaint of transverse myelitis (27 Apr 2022 14:21)      HPI:  71 M with no past medical history, not on any medications, transferred here from [facility] for bilateral lower extremity weakness, with concern for Transverse Myelitis vs Guillan [place], for plasma exchange. Patient's story dates back to week and a half ago, when he went to [place] for a vacation, developed nausea, vomiting, diarrhea and weakness, accompanied by abd distension and difficulty urinating for which he went to [facility]. There, he was found to be in urosepsis, complicated by urinary obstruction for which Marie Catheter was placed. On day prior to planned discharge 4/12, he started to develop severe paresthesia, numbness, and weakness of bilateral lower extremities. He underwent abdominal imaging and MRI of spine at [facility], and was found to have pancreatic lesion at pancreatic head, and MRI thoracic spine consistent with transverse myelitis. He then was given high dose steroids for three days, which provided minimal improvement of symptoms. As a result, patient transferred to [facility] for possible plasma exchange.   Of note-patient travelled to [place] and [place] in November, for which he received a series of immunizations that he does not recall.     Currently, he is endorsing weakness, tingling, and numbness of his bilateral lower extremities up until the point of his umbilicus. He is denying any other symptoms of his UE, notes no weakness, paresthesias, tingling. Currently only complaint is abdominal pain and constipation.      Relevant imaging/studies from [facility]:  CT AP: Ill defined low density lesion within pancreatic head represent a benign or malignant neoplasm. Measures 2.2 x 2.4 x 3.1 cm which may represent a benign or malignant neoplasm, pancreatic lesion partially compresses the portal vein and superior aspect of SMV. Bowel: Mild diffuse wall thickening of the ascending transverse and upper half of the descending colon which may relate to possible mild non specific colitis.   MRI Thoracic:  Borderline abn signal within the central cord substance throughout entire length of thoracic spine could reflect transverse myelitis.   ESR- 30 CRP- 14.2  CSF total protein 80, CSF PCR negative, others pending (15 Apr 2022 20:38)                            11.1   10.93 )-----------( 217      ( 29 Apr 2022 06:45 )             34.5       04-29    139  |  105  |  20  ----------------------------<  92  4.1   |  24  |  1.14    Ca    8.2<L>      29 Apr 2022 06:46  Phos  3.5     04-29  Mg     2.0     04-29    TPro  4.6<L>  /  Alb  4.1  /  TBili  0.5  /  DBili  x   /  AST  18  /  ALT  23  /  AlkPhos  18<L>  04-29    Vital Signs Last 24 Hrs  T(C): 37.2 (29 Apr 2022 05:42), Max: 37.2 (29 Apr 2022 05:42)  T(F): 98.9 (29 Apr 2022 05:42), Max: 98.9 (29 Apr 2022 05:42)  HR: 67 (29 Apr 2022 05:42) (67 - 72)  BP: 118/67 (29 Apr 2022 05:42) (118/64 - 118/67)  BP(mean): --  RR: 18 (29 Apr 2022 05:42) (18 - 18)  SpO2: 94% (29 Apr 2022 05:42) (94% - 95%)    MEDICATIONS  (STANDING):  albumin human  5% IVPB 3250 milliLiter(s) IV Intermittent once  calcium gluconate IVPB 2 Gram(s) IV Intermittent once  chlorhexidine 2% Cloths 1 Application(s) Topical <User Schedule>  cyanocobalamin 1000 MICROGram(s) Oral daily  enoxaparin Injectable 40 milliGRAM(s) SubCutaneous every 24 hours  gabapentin 100 milliGRAM(s) Oral three times a day  heparin  Lock Flush 100 Units/mL Injectable 600 Unit(s) IV Push once  lactulose Syrup 10 Gram(s) Oral every 24 hours  melatonin 3 milliGRAM(s) Oral at bedtime  pantoprazole    Tablet 40 milliGRAM(s) Oral two times a day  polyethylene glycol 3350 17 Gram(s) Oral daily  predniSONE   Tablet 60 milliGRAM(s) Oral every 24 hours  senna 1 Tablet(s) Oral at bedtime  simethicone 80 milliGRAM(s) Chew two times a day  tamsulosin 0.4 milliGRAM(s) Oral at bedtime    MEDICATIONS  (PRN):  acetaminophen     Tablet .. 650 milliGRAM(s) Oral every 6 hours PRN Temp greater or equal to 38C (100.4F), Mild Pain (1 - 3)  sodium chloride 0.9% lock flush 10 milliLiter(s) IV Push every 1 hour PRN Pre/post blood products, medications, blood draw, and to maintain line patency    Currently Undergoing Physical/ Occupational Therapy at bedside.    PT/OT Functional Status Assessment:   4/28/2022    Cognitive/Neuro/Behavioral  Cognitive/Neuro/Behavioral [WDL Definition: Alert; opens eyes spontaneously; arouses to voice or touch; oriented x 4; follows commands; speech spontaneous, logical; purposeful motor response; behavior appropriate to situation]: WDL    Language Assistance  Preferred Language to Address Healthcare Preferred Language to Address Healthcare: English    Therapeutic Interventions      Bed Mobility  Bed Mobility Training Rolling/Turning: moderate assist (50% patient effort);  set-up required;  verbal cues;  2 person assist  Bed Mobility Training Scooting: moderate assist (50% patient effort);  set-up required;  verbal cues;  2 person assist  Bed Mobility Training Sit-to-Supine: moderate assist (50% patient effort);  set-up required;  verbal cues;  2 person assist  Bed Mobility Training Supine-to-Sit: moderate assist (50% patient effort);  set-up required;  verbal cues;  2 person assist  Bed Mobility Training Limitations: decreased ability to use arms for pushing/pulling;  decreased ability to use legs for bridging/pushing;  impaired ability to control trunk for mobility;  decreased strength;  impaired balance;  impaired postural control    Therapeutic Exercise  Therapeutic Exercise Detail: Pt instructed in scapular elevation, depression, protraction, retraction, x 5 reps in each plane, min assistx2, while seated edge of bed.Pt instructed in B/L knee extension AAROM, x 10 reps while seated edge of bed.     Functional Endurance  Functional Endurance Detail: Pt engaged in therex/neuromuscular re-ed while seated edge of bed x 20min     Neuromuscular Re-education  Neuromuscular Re-education Detail: Pt instructed in lateral weightshifting, anterior weightshifting with contralateral reach, x 5 reps in each plane with min assistx2 to facilitate postural control for increased adls           PM&R Impression: as above    Current Disposition Plan :  acute rehab placement

## 2022-04-29 NOTE — PROGRESS NOTE ADULT - SUBJECTIVE AND OBJECTIVE BOX
LENGTH OF HOSPITAL STAY: 14d    SUBJECTIVE: No acute overnight events    HISTORY OF PRESENTING ILLNESS:   71 M with no past medical history, not on any medications, transferred here from [facility] for bilateral lower extremity weakness, with concern for Transverse Myelitis vs Guillan [place], for plasma exchange. Patient's story dates back to week and a half ago, when he went to [place] for a vacation, developed nausea, vomiting, diarrhea and weakness, accompanied by abd distension and difficulty urinating for which he went to [facility]. There, he was found to be in urosepsis, complicated by urinary obstruction for which Marie Catheter was placed. On day prior to planned discharge 4/12, he started to develop severe paresthesia, numbness, and weakness of bilateral lower extremities. He underwent abdominal imaging and MRI of spine at [facility], and was found to have pancreatic lesion at pancreatic head, and MRI thoracic spine consistent with transverse myelitis. He then was given high dose steroids for three days, which provided minimal improvement of symptoms. As a result, patient transferred to [facility] for possible plasma exchange.   Of note-patient travelled to [place] and [place] in November, for which he received a series of immunizations that he does not recall.     Currently, he is endorsing weakness, tingling, and numbness of his bilateral lower extremities up until the point of his umbilicus. He is denying any other symptoms of his UE, notes no weakness, paresthesias, tingling. Currently only complaint is abdominal pain and constipation.      Relevant imaging/studies from [facility]:  CT AP: Ill defined low density lesion within pancreatic head represent a benign or malignant neoplasm. Measures 2.2 x 2.4 x 3.1 cm which may represent a benign or malignant neoplasm, pancreatic lesion partially compresses the portal vein and superior aspect of SMV. Bowel: Mild diffuse wall thickening of the ascending transverse and upper half of the descending colon which may relate to possible mild non specific colitis.   MRI Thoracic:  Borderline abn signal within the central cord substance throughout entire length of thoracic spine could reflect transverse myelitis.   ESR- 30 CRP- 14.2  CSF total protein 80, CSF PCR negative, others pending (15 Apr 2022 20:38)    ALLERGIES:  No Known Allergies    MEDICATIONS:  STANDING MEDICATIONS  albumin human  5% IVPB 3250 milliLiter(s) IV Intermittent once  calcium gluconate IVPB 2 Gram(s) IV Intermittent once  chlorhexidine 2% Cloths 1 Application(s) Topical <User Schedule>  cyanocobalamin 1000 MICROGram(s) Oral daily  enoxaparin Injectable 40 milliGRAM(s) SubCutaneous every 24 hours  gabapentin 100 milliGRAM(s) Oral three times a day  heparin  Lock Flush 100 Units/mL Injectable 600 Unit(s) IV Push once  lactulose Syrup 10 Gram(s) Oral every 24 hours  melatonin 3 milliGRAM(s) Oral at bedtime  pantoprazole    Tablet 40 milliGRAM(s) Oral two times a day  polyethylene glycol 3350 17 Gram(s) Oral daily  predniSONE   Tablet 60 milliGRAM(s) Oral every 24 hours  senna 1 Tablet(s) Oral at bedtime  simethicone 80 milliGRAM(s) Chew two times a day  tamsulosin 0.4 milliGRAM(s) Oral at bedtime    PRN MEDICATIONS  acetaminophen     Tablet .. 650 milliGRAM(s) Oral every 6 hours PRN  sodium chloride 0.9% lock flush 10 milliLiter(s) IV Push every 1 hour PRN    VITALS:   T(F): 98  HR: 83  BP: 109/64  RR: 18  SpO2: 95%    LABS:                        11.1   10.93 )-----------( 217      ( 29 Apr 2022 06:45 )             34.5     04-29    139  |  105  |  20  ----------------------------<  92  4.1   |  24  |  1.14    Ca    8.2<L>      29 Apr 2022 06:46  Phos  3.5     04-29  Mg     2.0     04-29    TPro  4.6<L>  /  Alb  4.1  /  TBili  0.5  /  DBili  x   /  AST  18  /  ALT  23  /  AlkPhos  18<L>  04-29    PT/INR - ( 29 Apr 2022 06:45 )   PT: 15.0 sec;   INR: 1.26        PTT - ( 29 Apr 2022 06:45 )  PTT:36.3 sec    RADIOLOGY:  Reviewed    PHYSICAL EXAM:  GEN: No acute distress  HEENT: NCAT  LUNGS: Clear to auscultation bilaterally   HEART: S1/S2 present. RRR.   ABD: Soft, non-tender, non-distended. Bowel sounds present  EXT: 2-3/5 bilateral LLE  NEURO: AAOX3

## 2022-04-29 NOTE — PROGRESS NOTE ADULT - PROBLEM SELECTOR PLAN 3
RESOLVED. Presented with WBC of 16, elevated at [facility] as well. Could be 2/2 infection-history of UTI at [facility] treated with 5 day course of CTX, denying any complaints. No cough, no persistent GI symptoms, no diarrhea, no vomiting. Could be 2/2 reactive process given neurological symptomology. Could also be 2/2 prednisone.   - UA positive, nation exchanged   - CXR at [facility] clear without infiltrates  - Patient currently constipated-unlikely to be GI source. RESOLVED. Presented with WBC of 16, elevated at [facility] as well. Could be 2/2 infection-history of UTI at [facility] treated with 5 day course of CTX, denying any complaints. No cough, no persistent GI symptoms, no diarrhea, no vomiting. Could be 2/2 reactive process given neurological symptomology. Could also be 2/2 prednisone.   - UA positive, nation exchanged   - CXR at [facility] clear without infiltrates  - Patient currently constipated

## 2022-04-29 NOTE — PROVIDER CONTACT NOTE (CRITICAL VALUE NOTIFICATION) - SITUATION
Notified of critical lab value from hematology.
Patient at baseline.
neuromyelitis optica antibody test cancelled due to not enough sample size.

## 2022-04-29 NOTE — PROGRESS NOTE ADULT - PROBLEM SELECTOR PLAN 5
Patient initially to [facility] with urosepsis c/b urinary retention, likely 2/2 neurological process. Nation placed at [facility].   -continue with flomax 0.4 QHS   -neuro workup as above  - patient nation'ed on 4/22 due to retention   - Will do another trial of void prior to discharge - likely monday 5/2 (1 week after prior TOV), continue nation for now .    #Complicated UTI   RESOLVED   s/p CTX  - UClx growing >100K ESBL E. Coli, completed Ertapenem 1g q24h x5days (4/20-4/24)

## 2022-04-29 NOTE — PROGRESS NOTE ADULT - PROBLEM SELECTOR PLAN 4
RESOLVED  Downtrending LFTs, likely 2/2 pancreatic mass versus autoimmune etiology. No significant drinking or drug history. No tenderness on exam, no organomegaly.   - follow malignancy workup as above.  - f/u numerous titers and PCR's ordered per GI RESOLVED  Downtrending LFTs, likely 2/2 pancreatic mass versus autoimmune etiology. No significant drinking or drug history. No tenderness on exam, no organomegaly.   - follow malignancy workup as above.  - f/u titers and PCR's ordered per GI

## 2022-04-29 NOTE — PROGRESS NOTE ADULT - ASSESSMENT
70 yo M with no significant PMHx, transferred from [facility] for bilateral lower extremity weakness, with working diagnosis of thoracic transverse myelitis, s/p lumbar puncture at [facility], failed high-dose steroids and expected to have minimal-no response to IVIG per Neurology. Incidentally found ot have a cystic pancreatic head lesion (2.2 x 2.4 x 3.1 cm), suspected IPMN, with partial compression of portal vein and superior aspect of SMV with non-specific colitis of ascending transverse and upper half of descending colon. MRCP (04/16/22) showed a complex cystic pancreatic head lesion, either IPMN or atypical serous cystadenoma, with recommendation for EUS and biopsy sampling. Hematology consulted for initiation of PLEX on 04/20/22 per Neurology.    #) DIAGNOSIS  - Steroid- and IVIG-refractory thoracic transverse myelitis  - Mild hypofibrinogenemia 2/2 PLEX  - Complex cystic pancreatic head mass     #) PLAN  - Per Neurology, planning for 7 sessions of PLEX starting on 04/20/22 via HD catheter (completed 5 of 7 sessions). Pending for 6th session tomorrow  - Recheck fibrinogen and PT/PTT on 04/30/22 in AM prior to 6th treatment.   - Will need to trend CBC with differential, CMP once daily.  - Follow-up with GI for eventual biopsy of the pancreatic head lesion.   - Maintain active T+S, transfuse if Hb < 7 or if actively bleeding     Discussed with Dr. Anne

## 2022-04-29 NOTE — PROGRESS NOTE ADULT - ASSESSMENT
71 M with no past medical history, not on any medications, transferred here from [facility] for bilateral lower extremity weakness, with concern for Transverse Myelitis

## 2022-04-29 NOTE — PROGRESS NOTE ADULT - ATTENDING COMMENTS
leg strength continues to improve gradually - can extend knee for over 10 seconds against gravity (yesterday only 2-3 seconds)   feels good and optimistic overall  will complete 7 sessions of plex then d/c to acute rehab  to get cryoprecipitate today for low fibrinogen   continue other meds

## 2022-04-29 NOTE — PROGRESS NOTE ADULT - PROBLEM SELECTOR PLAN 1
Hx diarrhea two weeks prior to acute LE weakness, imaging consistent with transverse myelitis. Elevated inflammatory markers. Lumbar tap done at [facility]-for which studies pending. s/p x3d high dose steroids with minimal improvement in symptoms.   - s/p IVIG 0.65g/kg x3 days, HD cath placed 4/19 for PLEX starting 4/20 x5 sessions(heme/onc following)  - MR head and CSpine w/wout - faint T2 hyperintensity in posterior lower cervical cord without definite enhancement   - NMO and anti-MOG antibodies negative  - Autoimmune myelopathy panel sent 4/20 6pm  - PT started PLEX therapy 4/20, to be done every other day, for 7 total sessions. Fifth session done on 4/28. Sixth session scheduled for 4/30  - started 60mg PO prednisone daily on 4/24 - decrease by 10mg every week  - to be discharged to acute rehab once treatment is complete  - Will do another trial of void prior to discharge - likely monday 5/2 (1 week after prior TOV), continue nation for now

## 2022-04-29 NOTE — PROGRESS NOTE ADULT - ATTENDING COMMENTS
responding to PLEX  Will continue with treatment as per neurology  Low fibrinogen noted - will need cryo again today

## 2022-04-29 NOTE — PROGRESS NOTE ADULT - PROBLEM SELECTOR PLAN 2
CT AP 2.3 x 2.4 x 3.1 at pancreatic head, as per records, workup initiated at [facility]; however, unsure of results.  - Ca-19-9 and CEA negative at [facility]   - MR abdomen/pelvis w cont - pancreatic mass indeterminate etiology    - f/u GI recs  - per GI, EGD/EUS procedure to be performed after plasmapheresis and acute neurologic issues resolved. Will defer to outpatient as elective procedure.

## 2022-04-29 NOTE — PROVIDER CONTACT NOTE (CRITICAL VALUE NOTIFICATION) - TEST AND RESULT REPORTED:
neuromyelitis optica antibody test cancelled due to not enough sample size.
Fibrinogen, 94
Fibrinogen 73

## 2022-04-29 NOTE — PROGRESS NOTE ADULT - ASSESSMENT
per Neurology    71 y o M with no past medical history, not on any medications, transferred here from [facility] for bilateral lower extremity weakness, with concern for Transverse Myelitis    Problem/Plan - 1:  ·  Problem: Lower extremity weakness.   ·  Plan: Hx diarrhea two weeks prior to acute LE weakness, imaging consistent with transverse myelitis. Elevated inflammatory markers. Lumbar tap done at [facility]-for which studies pending. s/p x3d high dose steroids with minimal improvement in symptoms.   - s/p IVIG 0.65g/kg x3 days, HD cath placed 4/19 for PLEX starting 4/20 x5 sessions(heme/onc following)  - MR head and CSpine w/wout - faint T2 hyperintensity in posterior lower cervical cord without definite enhancement   - NMO and anti-MOG antibodies negative  - Autoimmune myelopathy panel sent 4/20 6pm  - PT started PLEX therapy 4/20, to be done every other day, for 7 total sessions. Fourth session done on 4/26. Fifth session on 4/28  - transitioned to 60mg PO prednisone daily on 4/24 - decrease by 10mg every week  - to be discharged to acute rehab once treatment is complete  - Will do another trial of void prior to discharge - likely monday (1 week after prior TOV), continue nation for now .    Problem/Plan - 2:  ·  Problem: Pancreatic mass.   ·  Plan: CT AP 2.3 x 2.4 x 3.1 at pancreatic head, as per records, workup initiated at [facility]; however, unsure of results.  - Ca-19-9 and CEA negative at [facility]   - MR abdomen/pelvis w cont - pancreatic mass indeterminate etiology    - f/u GI recs  - per GI, EGD/EUS procedure to be performed after plasmapheresis and acute neurologic issues resolved. Will defer to outpatient as elective procedure.    Problem/Plan - 3:  ·  Problem: Leukocytosis.   ·  Plan: RESOLVED. Presented with WBC of 16, elevated at [facility] as well. Could be 2/2 infection-history of UTI at [facility] treated with 5 day course of CTX, denying any complaints. No cough, no persistent GI symptoms, no diarrhea, no vomiting. Could be 2/2 reactive process given neurological symptomology. Could also be 2/2 prednisone.   - UA positive, nation exchanged   - CXR at [facility] clear without infiltrates  - Patient currently constipated-unlikely to be GI source.    Problem/Plan - 4:  ·  Problem: Transaminitis.   ·  Plan: Downtrending LFTs, likely 2/2 pancreatic mass versus autoimmune etiology. No significant drinking or drug history. No tenderness on exam, no organomegaly.   - follow malignancy workup as above.  - f/u numerous titers and PCR's ordered per GI.    Problem/Plan - 5:  ·  Problem: Urinary retention.   ·  Plan: Patient initially to [facility] with urosepsis c/b urinary retention, likely 2/2 neurological process. Nation placed at [facility].   -continue with flomax 0.4 QHS   -neuro workup as above  - patient nation'ed on 4/22 due to retention   - Will do another trial of void prior to discharge - likely monday (1 week after prior TOV), continue nation for now .    #Complicated UTI   RESOLVED   s/p CTX  - UClx growing >100K ESBL E. Coli, completed Ertapenem 1g q24h x5days (4/20-4/24).    Problem/Plan - 6:  ·  Problem: Preventive measure.   ·  Plan: F-none    E-replete PRN    N-regular diet      DVT-lovenox 40SQ.

## 2022-04-30 LAB
ALBUMIN SERPL ELPH-MCNC: 4.2 G/DL — SIGNIFICANT CHANGE UP (ref 3.3–5)
ALP SERPL-CCNC: 28 U/L — LOW (ref 40–120)
ALT FLD-CCNC: 29 U/L — SIGNIFICANT CHANGE UP (ref 10–45)
ANION GAP SERPL CALC-SCNC: 9 MMOL/L — SIGNIFICANT CHANGE UP (ref 5–17)
APTT BLD: 26.6 SEC — LOW (ref 27.5–35.5)
AST SERPL-CCNC: 18 U/L — SIGNIFICANT CHANGE UP (ref 10–40)
BASOPHILS # BLD AUTO: 0.02 K/UL — SIGNIFICANT CHANGE UP (ref 0–0.2)
BASOPHILS NFR BLD AUTO: 0.1 % — SIGNIFICANT CHANGE UP (ref 0–2)
BILIRUB SERPL-MCNC: 0.6 MG/DL — SIGNIFICANT CHANGE UP (ref 0.2–1.2)
BLD GP AB SCN SERPL QL: NEGATIVE — SIGNIFICANT CHANGE UP
BUN SERPL-MCNC: 19 MG/DL — SIGNIFICANT CHANGE UP (ref 7–23)
CALCIUM SERPL-MCNC: 8.6 MG/DL — SIGNIFICANT CHANGE UP (ref 8.4–10.5)
CHLORIDE SERPL-SCNC: 103 MMOL/L — SIGNIFICANT CHANGE UP (ref 96–108)
CO2 SERPL-SCNC: 26 MMOL/L — SIGNIFICANT CHANGE UP (ref 22–31)
CREAT SERPL-MCNC: 1.03 MG/DL — SIGNIFICANT CHANGE UP (ref 0.5–1.3)
EGFR: 78 ML/MIN/1.73M2 — SIGNIFICANT CHANGE UP
EOSINOPHIL # BLD AUTO: 0.04 K/UL — SIGNIFICANT CHANGE UP (ref 0–0.5)
EOSINOPHIL NFR BLD AUTO: 0.3 % — SIGNIFICANT CHANGE UP (ref 0–6)
FIBRINOGEN PPP-MCNC: 136 MG/DL — LOW (ref 258–438)
GLUCOSE SERPL-MCNC: 108 MG/DL — HIGH (ref 70–99)
HCT VFR BLD CALC: 36.2 % — LOW (ref 39–50)
HGB BLD-MCNC: 11.8 G/DL — LOW (ref 13–17)
IMM GRANULOCYTES NFR BLD AUTO: 2.7 % — HIGH (ref 0–1.5)
INR BLD: 1 — SIGNIFICANT CHANGE UP (ref 0.88–1.16)
LYMPHOCYTES # BLD AUTO: 1.94 K/UL — SIGNIFICANT CHANGE UP (ref 1–3.3)
LYMPHOCYTES # BLD AUTO: 14.3 % — SIGNIFICANT CHANGE UP (ref 13–44)
MAGNESIUM SERPL-MCNC: 2.2 MG/DL — SIGNIFICANT CHANGE UP (ref 1.6–2.6)
MCHC RBC-ENTMCNC: 29.4 PG — SIGNIFICANT CHANGE UP (ref 27–34)
MCHC RBC-ENTMCNC: 32.6 GM/DL — SIGNIFICANT CHANGE UP (ref 32–36)
MCV RBC AUTO: 90 FL — SIGNIFICANT CHANGE UP (ref 80–100)
MONOCYTES # BLD AUTO: 0.61 K/UL — SIGNIFICANT CHANGE UP (ref 0–0.9)
MONOCYTES NFR BLD AUTO: 4.5 % — SIGNIFICANT CHANGE UP (ref 2–14)
NEUTROPHILS # BLD AUTO: 10.58 K/UL — HIGH (ref 1.8–7.4)
NEUTROPHILS NFR BLD AUTO: 78.1 % — HIGH (ref 43–77)
NRBC # BLD: 0 /100 WBCS — SIGNIFICANT CHANGE UP (ref 0–0)
PHOSPHATE SERPL-MCNC: 3.6 MG/DL — SIGNIFICANT CHANGE UP (ref 2.5–4.5)
PLATELET # BLD AUTO: 221 K/UL — SIGNIFICANT CHANGE UP (ref 150–400)
POTASSIUM SERPL-MCNC: 4.3 MMOL/L — SIGNIFICANT CHANGE UP (ref 3.5–5.3)
POTASSIUM SERPL-SCNC: 4.3 MMOL/L — SIGNIFICANT CHANGE UP (ref 3.5–5.3)
PROT SERPL-MCNC: 5.1 G/DL — LOW (ref 6–8.3)
PROTHROM AB SERPL-ACNC: 11.9 SEC — SIGNIFICANT CHANGE UP (ref 10.5–13.4)
RBC # BLD: 4.02 M/UL — LOW (ref 4.2–5.8)
RBC # FLD: 14.2 % — SIGNIFICANT CHANGE UP (ref 10.3–14.5)
RH IG SCN BLD-IMP: NEGATIVE — SIGNIFICANT CHANGE UP
SODIUM SERPL-SCNC: 138 MMOL/L — SIGNIFICANT CHANGE UP (ref 135–145)
WBC # BLD: 13.55 K/UL — HIGH (ref 3.8–10.5)
WBC # FLD AUTO: 13.55 K/UL — HIGH (ref 3.8–10.5)

## 2022-04-30 PROCEDURE — 99231 SBSQ HOSP IP/OBS SF/LOW 25: CPT

## 2022-04-30 RX ORDER — LACTULOSE 10 G/15ML
5 SOLUTION ORAL DAILY
Refills: 0 | Status: DISCONTINUED | OUTPATIENT
Start: 2022-04-30 | End: 2022-05-09

## 2022-04-30 RX ORDER — CALCIUM GLUCONATE 100 MG/ML
2 VIAL (ML) INTRAVENOUS ONCE
Refills: 0 | Status: COMPLETED | OUTPATIENT
Start: 2022-05-02 | End: 2022-05-02

## 2022-04-30 RX ORDER — ALBUMIN HUMAN 25 %
3250 VIAL (ML) INTRAVENOUS ONCE
Refills: 0 | Status: COMPLETED | OUTPATIENT
Start: 2022-05-02 | End: 2022-05-02

## 2022-04-30 RX ADMIN — LACTULOSE 5 GRAM(S): 10 SOLUTION ORAL at 11:25

## 2022-04-30 RX ADMIN — PANTOPRAZOLE SODIUM 40 MILLIGRAM(S): 20 TABLET, DELAYED RELEASE ORAL at 18:17

## 2022-04-30 RX ADMIN — TAMSULOSIN HYDROCHLORIDE 0.4 MILLIGRAM(S): 0.4 CAPSULE ORAL at 21:43

## 2022-04-30 RX ADMIN — SENNA PLUS 1 TABLET(S): 8.6 TABLET ORAL at 21:43

## 2022-04-30 RX ADMIN — SIMETHICONE 80 MILLIGRAM(S): 80 TABLET, CHEWABLE ORAL at 05:58

## 2022-04-30 RX ADMIN — GABAPENTIN 100 MILLIGRAM(S): 400 CAPSULE ORAL at 14:17

## 2022-04-30 RX ADMIN — GABAPENTIN 100 MILLIGRAM(S): 400 CAPSULE ORAL at 21:43

## 2022-04-30 RX ADMIN — CHLORHEXIDINE GLUCONATE 1 APPLICATION(S): 213 SOLUTION TOPICAL at 05:58

## 2022-04-30 RX ADMIN — PANTOPRAZOLE SODIUM 40 MILLIGRAM(S): 20 TABLET, DELAYED RELEASE ORAL at 05:57

## 2022-04-30 RX ADMIN — Medication 3 MILLIGRAM(S): at 21:43

## 2022-04-30 RX ADMIN — PREGABALIN 1000 MICROGRAM(S): 225 CAPSULE ORAL at 11:25

## 2022-04-30 RX ADMIN — Medication 60 MILLIGRAM(S): at 05:57

## 2022-04-30 RX ADMIN — ENOXAPARIN SODIUM 40 MILLIGRAM(S): 100 INJECTION SUBCUTANEOUS at 21:43

## 2022-04-30 RX ADMIN — GABAPENTIN 100 MILLIGRAM(S): 400 CAPSULE ORAL at 05:57

## 2022-04-30 RX ADMIN — SIMETHICONE 80 MILLIGRAM(S): 80 TABLET, CHEWABLE ORAL at 18:17

## 2022-04-30 NOTE — PROGRESS NOTE ADULT - PROBLEM SELECTOR PLAN 4
RESOLVED  Downtrending LFTs, likely 2/2 pancreatic mass versus autoimmune etiology. No significant drinking or drug history. No tenderness on exam, no organomegaly.   - follow malignancy workup as above.  - f/u titers and PCR's ordered per GI

## 2022-04-30 NOTE — CHART NOTE - NSCHARTNOTEFT_GEN_A_CORE
Follow up evaluation of right IJ HD Catheter. Dressing loose. Site cleansed per nursing protocol and central line dressing changed. Site appears CDI. Patient with CHG wipes. Education done for nursing and patient. Plan for last PLEX Monday and will DC line.

## 2022-04-30 NOTE — PROGRESS NOTE ADULT - ATTENDING COMMENTS
Leg strength stable from yesterday  no new complaints  PLEX #6 today, last session on monday  f/u fibrinogen  continue other meds

## 2022-04-30 NOTE — PROGRESS NOTE ADULT - ASSESSMENT
72 yo M with no significant PMHx, transferred from [facility] for bilateral lower extremity weakness, with working diagnosis of thoracic transverse myelitis, s/p lumbar puncture at [facility], failed high-dose steroids and expected to have minimal-no response to IVIG per Neurology. Incidentally found ot have a cystic pancreatic head lesion (2.2 x 2.4 x 3.1 cm), suspected IPMN, with partial compression of portal vein and superior aspect of SMV with non-specific colitis of ascending transverse and upper half of descending colon. MRCP (04/16/22) showed a complex cystic pancreatic head lesion, either IPMN or atypical serous cystadenoma, with recommendation for EUS and biopsy sampling. Hematology consulted for initiation of PLEX on 04/20/22 per Neurology.    #) DIAGNOSIS  - Steroid- and IVIG-refractory thoracic transverse myelitis  - Mild hypofibrinogenemia 2/2 PLEX  - Complex cystic pancreatic head mass     #) PLAN  - Per Neurology, planning for 7 sessions of PLEX starting on 04/20/22 via HD catheter (completed 6 of 7 sessions). Will give 5 U cryoprecipitate after PLEX today. Final session of PLEX on 05/01/22.   - Recheck fibrinogen and PT/PTT on 04/30/22 in AM prior to 6th treatment.   - Will need to trend CBC with differential, CMP once daily.  - Follow-up with GI for eventual biopsy of the pancreatic head lesion.   - Maintain active T+S, transfuse if Hb < 7 or if actively bleeding     Discussed with Dr. Anne

## 2022-04-30 NOTE — PROGRESS NOTE ADULT - SUBJECTIVE AND OBJECTIVE BOX
INTERVAL HPI/OVERNIGHT EVENTS:  O/N: ANDREA  This morning: Patient was seen and examined at bedside.  Patient reports bladder and bowel incontinence, no pain, improved mobility and strength in lower sensation    VITAL SIGNS:  T(F): 97.7 (04-30-22 @ 11:46)  HR: 81 (04-30-22 @ 11:46)  BP: 94/55 (04-30-22 @ 11:46)  RR: 18 (04-30-22 @ 11:46)  SpO2: 95% (04-30-22 @ 11:46)  Wt(kg): --    PHYSICAL EXAM:    General: NAD, well developed  HEENT: NC/AT; EOMI, PERRLA, anicteric sclera; moist mucosal membranes.  Neck: supple, trachea midline  Cardiovascular: RRR, +S1/S2; NO M/R/G  Respiratory: CTA B/L; no W/R/R  Gastrointestinal: soft, NT/ND; +BSx4  Extremities: WWP; no edema or cyanosis  Vascular: 2+ radial, DP/PT pulses B/L  Neurological: AAOx3; able to move feet and wiggle toes; slight ability to raise leg against gravity; numbness from umbilicus downwards     MEDICATIONS  (STANDING):  albumin human  5% IVPB 3250 milliLiter(s) IV Intermittent once  calcium gluconate IVPB 2 Gram(s) IV Intermittent once  chlorhexidine 2% Cloths 1 Application(s) Topical <User Schedule>  cyanocobalamin 1000 MICROGram(s) Oral daily  enoxaparin Injectable 40 milliGRAM(s) SubCutaneous every 24 hours  gabapentin 100 milliGRAM(s) Oral three times a day  heparin  Lock Flush 100 Units/mL Injectable 600 Unit(s) IV Push once  lactulose Syrup 5 Gram(s) Oral daily  melatonin 3 milliGRAM(s) Oral at bedtime  pantoprazole    Tablet 40 milliGRAM(s) Oral two times a day  polyethylene glycol 3350 17 Gram(s) Oral daily  predniSONE   Tablet 60 milliGRAM(s) Oral every 24 hours  senna 1 Tablet(s) Oral at bedtime  simethicone 80 milliGRAM(s) Chew two times a day  tamsulosin 0.4 milliGRAM(s) Oral at bedtime    MEDICATIONS  (PRN):  acetaminophen     Tablet .. 650 milliGRAM(s) Oral every 6 hours PRN Temp greater or equal to 38C (100.4F), Mild Pain (1 - 3)  sodium chloride 0.9% lock flush 10 milliLiter(s) IV Push every 1 hour PRN Pre/post blood products, medications, blood draw, and to maintain line patency      Allergies    No Known Allergies    Intolerances    LABS:                        11.8   13.55 )-----------( 221      ( 30 Apr 2022 07:57 )             36.2     04-30    138  |  103  |  19  ----------------------------<  108<H>  4.3   |  26  |  1.03    Ca    8.6      30 Apr 2022 07:57  Phos  3.6     04-30  Mg     2.2     04-30    TPro  5.1<L>  /  Alb  4.2  /  TBili  0.6  /  DBili  x   /  AST  18  /  ALT  29  /  AlkPhos  28<L>  04-30    PT/INR - ( 30 Apr 2022 07:57 )   PT: 11.9 sec;   INR: 1.00          PTT - ( 30 Apr 2022 07:57 )  PTT:26.6 sec            RADIOLOGY & ADDITIONAL TESTS:  Reviewed INTERVAL HPI/OVERNIGHT EVENTS:  O/N: ANDREA  This morning: Patient was seen and examined at bedside.  Patient reports bladder and bowel incontinence, no pain, improved mobility and strength in lower sensation    VITAL SIGNS:  T(F): 97.7 (04-30-22 @ 11:46)  HR: 81 (04-30-22 @ 11:46)  BP: 94/55 (04-30-22 @ 11:46)  RR: 18 (04-30-22 @ 11:46)  SpO2: 95% (04-30-22 @ 11:46)  Wt(kg): --    PHYSICAL EXAM:    General: NAD, well developed  HEENT: NC/AT; EOMI, PERRLA, anicteric sclera; moist mucosal membranes.  Neck: supple, trachea midline  Cardiovascular: RRR, +S1/S2; NO M/R/G  Respiratory: CTA B/L; no W/R/R  Gastrointestinal: soft, NT/ND; +BSx4  Extremities: WWP; no edema or cyanosis  Vascular: 2+ radial, DP/PT pulses B/L  Neurological: AAOx3; hip flexion 2/5, knee extension 3/5, ankle dorsiflexion 4/5 b/l; numbness from umbilicus downwards     MEDICATIONS  (STANDING):  albumin human  5% IVPB 3250 milliLiter(s) IV Intermittent once  calcium gluconate IVPB 2 Gram(s) IV Intermittent once  chlorhexidine 2% Cloths 1 Application(s) Topical <User Schedule>  cyanocobalamin 1000 MICROGram(s) Oral daily  enoxaparin Injectable 40 milliGRAM(s) SubCutaneous every 24 hours  gabapentin 100 milliGRAM(s) Oral three times a day  heparin  Lock Flush 100 Units/mL Injectable 600 Unit(s) IV Push once  lactulose Syrup 5 Gram(s) Oral daily  melatonin 3 milliGRAM(s) Oral at bedtime  pantoprazole    Tablet 40 milliGRAM(s) Oral two times a day  polyethylene glycol 3350 17 Gram(s) Oral daily  predniSONE   Tablet 60 milliGRAM(s) Oral every 24 hours  senna 1 Tablet(s) Oral at bedtime  simethicone 80 milliGRAM(s) Chew two times a day  tamsulosin 0.4 milliGRAM(s) Oral at bedtime    MEDICATIONS  (PRN):  acetaminophen     Tablet .. 650 milliGRAM(s) Oral every 6 hours PRN Temp greater or equal to 38C (100.4F), Mild Pain (1 - 3)  sodium chloride 0.9% lock flush 10 milliLiter(s) IV Push every 1 hour PRN Pre/post blood products, medications, blood draw, and to maintain line patency      Allergies    No Known Allergies    Intolerances    LABS:                        11.8   13.55 )-----------( 221      ( 30 Apr 2022 07:57 )             36.2     04-30    138  |  103  |  19  ----------------------------<  108<H>  4.3   |  26  |  1.03    Ca    8.6      30 Apr 2022 07:57  Phos  3.6     04-30  Mg     2.2     04-30    TPro  5.1<L>  /  Alb  4.2  /  TBili  0.6  /  DBili  x   /  AST  18  /  ALT  29  /  AlkPhos  28<L>  04-30    PT/INR - ( 30 Apr 2022 07:57 )   PT: 11.9 sec;   INR: 1.00          PTT - ( 30 Apr 2022 07:57 )  PTT:26.6 sec            RADIOLOGY & ADDITIONAL TESTS:  Reviewed

## 2022-04-30 NOTE — PROGRESS NOTE ADULT - SUBJECTIVE AND OBJECTIVE BOX
LENGTH OF HOSPITAL STAY: 15d    SUBJECTIVE: No acute overnight events    HISTORY OF PRESENTING ILLNESS:   71 M with no past medical history, not on any medications, transferred here from [facility] for bilateral lower extremity weakness, with concern for Transverse Myelitis vs [place], for plasma exchange. Patient's story dates back to week and a half ago, when he went to [place] for a vacation, developed nausea, vomiting, diarrhea and weakness, accompanied by abd distension and difficulty urinating for which he went to [facility]. There, he was found to be in urosepsis, complicated by urinary obstruction for which Marie Catheter was placed. On day prior to planned discharge 4/12, he started to develop severe paresthesia, numbness, and weakness of bilateral lower extremities. He underwent abdominal imaging and MRI of spine at [facility], and was found to have pancreatic lesion at pancreatic head, and MRI thoracic spine consistent with transverse myelitis. He then was given high dose steroids for three days, which provided minimal improvement of symptoms. As a result, patient transferred to [facility] for possible plasma exchange.   Of note-patient travelled to [place] and [place] in November, for which he received a series of immunizations that he does not recall.     Currently, he is endorsing weakness, tingling, and numbness of his bilateral lower extremities up until the point of his umbilicus. He is denying any other symptoms of his UE, notes no weakness, paresthesias, tingling. Currently only complaint is abdominal pain and constipation.      Relevant imaging/studies from [facility]:  CT AP: Ill defined low density lesion within pancreatic head represent a benign or malignant neoplasm. Measures 2.2 x 2.4 x 3.1 cm which may represent a benign or malignant neoplasm, pancreatic lesion partially compresses the portal vein and superior aspect of SMV. Bowel: Mild diffuse wall thickening of the ascending transverse and upper half of the descending colon which may relate to possible mild non specific colitis.   MRI Thoracic:  Borderline abn signal within the central cord substance throughout entire length of thoracic spine could reflect transverse myelitis.   ESR- 30 CRP- 14.2  CSF total protein 80, CSF PCR negative, others pending (15 Apr 2022 20:38)    ALLERGIES:  No Known Allergies    MEDICATIONS:  STANDING MEDICATIONS  albumin human  5% IVPB 3250 milliLiter(s) IV Intermittent once  calcium gluconate IVPB 2 Gram(s) IV Intermittent once  chlorhexidine 2% Cloths 1 Application(s) Topical <User Schedule>  cyanocobalamin 1000 MICROGram(s) Oral daily  enoxaparin Injectable 40 milliGRAM(s) SubCutaneous every 24 hours  gabapentin 100 milliGRAM(s) Oral three times a day  heparin  Lock Flush 100 Units/mL Injectable 600 Unit(s) IV Push once  lactulose Syrup 5 Gram(s) Oral daily  melatonin 3 milliGRAM(s) Oral at bedtime  pantoprazole    Tablet 40 milliGRAM(s) Oral two times a day  polyethylene glycol 3350 17 Gram(s) Oral daily  predniSONE   Tablet 60 milliGRAM(s) Oral every 24 hours  senna 1 Tablet(s) Oral at bedtime  simethicone 80 milliGRAM(s) Chew two times a day  tamsulosin 0.4 milliGRAM(s) Oral at bedtime    PRN MEDICATIONS  acetaminophen     Tablet .. 650 milliGRAM(s) Oral every 6 hours PRN  sodium chloride 0.9% lock flush 10 milliLiter(s) IV Push every 1 hour PRN    VITALS:   T(F): 97.7  HR: 81  BP: 94/55  RR: 18  SpO2: 95%    LABS:                        11.8   13.55 )-----------( 221      ( 30 Apr 2022 07:57 )             36.2     04-30    138  |  103  |  19  ----------------------------<  108<H>  4.3   |  26  |  1.03    Ca    8.6      30 Apr 2022 07:57  Phos  3.6     04-30  Mg     2.2     04-30    TPro  5.1<L>  /  Alb  4.2  /  TBili  0.6  /  DBili  x   /  AST  18  /  ALT  29  /  AlkPhos  28<L>  04-30    PT/INR - ( 30 Apr 2022 07:57 )   PT: 11.9 sec;   INR: 1.00       PTT - ( 30 Apr 2022 07:57 )  PTT:26.6 sec    RADIOLOGY:  Reviewed    PHYSICAL EXAM:  GEN: No acute distress  HEENT: NCAT  LUNGS: Clear to auscultation bilaterally   HEART: S1/S2 present. RRR.   ABD: Soft, non-tender, non-distended. Bowel sounds present  EXT: No pitting edema  NEURO: AAOX3

## 2022-04-30 NOTE — PROGRESS NOTE ADULT - PROBLEM SELECTOR PLAN 1
Hx diarrhea two weeks prior to acute LE weakness, imaging consistent with transverse myelitis. Elevated inflammatory markers. Lumbar tap done at [facility]-for which studies pending. s/p x3d high dose steroids with minimal improvement in symptoms.   - s/p IVIG 0.65g/kg x3 days, HD cath placed 4/19 for PLEX starting 4/20 x5 sessions(heme/onc following)  - MR head and CSpine w/wout - faint T2 hyperintensity in posterior lower cervical cord without definite enhancement   - NMO and anti-MOG antibodies negative  - Autoimmune myelopathy panel sent 4/20 6pm  - PT started PLEX therapy 4/20, to be done every other day, for 7 total sessions. Sixth session scheduled for 4/30  - started 60mg PO prednisone daily on 4/24 - decrease by 10mg every week starting tomorrow 5/1  - to be discharged to acute rehab once treatment is complete  - Will do another trial of void likely monday 5/2 (1 week after prior TOV), continue nation for now  - No cryo transfusion today, ctm fibrinogen, if <125 consider transfusion per heme onc

## 2022-04-30 NOTE — PROGRESS NOTE ADULT - PROBLEM SELECTOR PLAN 2
CT AP 2.3 x 2.4 x 3.1 at pancreatic head, as per records, workup initiated at [facility]; however, unsure of results.  - Ca-19-9 and CEA negative at [facility]   - MR abdomen/pelvis w cont - pancreatic mass indeterminate etiology    - f/u GI recs  - per GI, EGD/EUS procedure to be performed after plasmapheresis and acute neurologic issues resolved. Will defer to outpatient as elective procedure.

## 2022-04-30 NOTE — PROGRESS NOTE ADULT - PROBLEM SELECTOR PLAN 5
Patient initially to [facility] with urosepsis c/b urinary retention, likely 2/2 neurological process. Nation placed at [facility].   -continue with flomax 0.4 QHS   -neuro workup as above  - patient nation'ed on 4/22 due to retention   - Will do another trial of void prior to discharge - likely monday 5/2 (1 week after prior TOV), continue nation for now     #Fecal retention  Passing gas, incontinent at times, difficulty controlling BMs  - Down on lactulose today    #Complicated UTI   RESOLVED   s/p CTX  - UClx growing >100K ESBL E. Coli, completed Ertapenem 1g q24h x5days (4/20-4/24)

## 2022-04-30 NOTE — PROGRESS NOTE ADULT - ASSESSMENT
71 M with no past medical history, not on any medications, transferred here from [facility] for bilateral lower extremity weakness 2/2 Transverse Myelitis of unknown etiology, now on steroids and plasmapharesis

## 2022-04-30 NOTE — PROGRESS NOTE ADULT - PROBLEM SELECTOR PLAN 3
RESOLVED. Presented with WBC of 16, elevated at [facility] as well. Could be 2/2 infection-history of UTI at [facility] treated with 5 day course of CTX, denying any complaints. No cough, no persistent GI symptoms, no diarrhea, no vomiting. Could be 2/2 reactive process given neurological symptomology. Could also be 2/2 prednisone.   - UA positive, nation exchanged   - CXR at [facility] clear without infiltrates  - Patient currently constipated

## 2022-05-01 LAB
ALBUMIN SERPL ELPH-MCNC: 4.5 G/DL — SIGNIFICANT CHANGE UP (ref 3.3–5)
ALP SERPL-CCNC: 23 U/L — LOW (ref 40–120)
ALT FLD-CCNC: 29 U/L — SIGNIFICANT CHANGE UP (ref 10–45)
ANION GAP SERPL CALC-SCNC: 8 MMOL/L — SIGNIFICANT CHANGE UP (ref 5–17)
APTT BLD: 30.6 SEC — SIGNIFICANT CHANGE UP (ref 27.5–35.5)
AST SERPL-CCNC: 25 U/L — SIGNIFICANT CHANGE UP (ref 10–40)
BASOPHILS # BLD AUTO: 0.02 K/UL — SIGNIFICANT CHANGE UP (ref 0–0.2)
BASOPHILS NFR BLD AUTO: 0.2 % — SIGNIFICANT CHANGE UP (ref 0–2)
BILIRUB SERPL-MCNC: 0.5 MG/DL — SIGNIFICANT CHANGE UP (ref 0.2–1.2)
BUN SERPL-MCNC: 18 MG/DL — SIGNIFICANT CHANGE UP (ref 7–23)
CALCIUM SERPL-MCNC: 8.5 MG/DL — SIGNIFICANT CHANGE UP (ref 8.4–10.5)
CHLORIDE SERPL-SCNC: 103 MMOL/L — SIGNIFICANT CHANGE UP (ref 96–108)
CO2 SERPL-SCNC: 26 MMOL/L — SIGNIFICANT CHANGE UP (ref 22–31)
CREAT SERPL-MCNC: 1.09 MG/DL — SIGNIFICANT CHANGE UP (ref 0.5–1.3)
EGFR: 73 ML/MIN/1.73M2 — SIGNIFICANT CHANGE UP
EOSINOPHIL # BLD AUTO: 0.03 K/UL — SIGNIFICANT CHANGE UP (ref 0–0.5)
EOSINOPHIL NFR BLD AUTO: 0.3 % — SIGNIFICANT CHANGE UP (ref 0–6)
FIBRINOGEN PPP-MCNC: 135 MG/DL — LOW (ref 258–438)
GLUCOSE SERPL-MCNC: 99 MG/DL — SIGNIFICANT CHANGE UP (ref 70–99)
HCT VFR BLD CALC: 35.1 % — LOW (ref 39–50)
HGB BLD-MCNC: 11 G/DL — LOW (ref 13–17)
IMM GRANULOCYTES NFR BLD AUTO: 3.4 % — HIGH (ref 0–1.5)
INR BLD: 1.07 — SIGNIFICANT CHANGE UP (ref 0.88–1.16)
LYMPHOCYTES # BLD AUTO: 2.69 K/UL — SIGNIFICANT CHANGE UP (ref 1–3.3)
LYMPHOCYTES # BLD AUTO: 25.3 % — SIGNIFICANT CHANGE UP (ref 13–44)
MAGNESIUM SERPL-MCNC: 2.1 MG/DL — SIGNIFICANT CHANGE UP (ref 1.6–2.6)
MCHC RBC-ENTMCNC: 28.2 PG — SIGNIFICANT CHANGE UP (ref 27–34)
MCHC RBC-ENTMCNC: 31.3 GM/DL — LOW (ref 32–36)
MCV RBC AUTO: 90 FL — SIGNIFICANT CHANGE UP (ref 80–100)
MELD SCORE WITH DIALYSIS: 20 POINTS — SIGNIFICANT CHANGE UP
MELD SCORE WITHOUT DIALYSIS: 8 POINTS — SIGNIFICANT CHANGE UP
MONOCYTES # BLD AUTO: 0.56 K/UL — SIGNIFICANT CHANGE UP (ref 0–0.9)
MONOCYTES NFR BLD AUTO: 5.3 % — SIGNIFICANT CHANGE UP (ref 2–14)
NEUTROPHILS # BLD AUTO: 6.98 K/UL — SIGNIFICANT CHANGE UP (ref 1.8–7.4)
NEUTROPHILS NFR BLD AUTO: 65.5 % — SIGNIFICANT CHANGE UP (ref 43–77)
NRBC # BLD: 0 /100 WBCS — SIGNIFICANT CHANGE UP (ref 0–0)
PHOSPHATE SERPL-MCNC: 3.9 MG/DL — SIGNIFICANT CHANGE UP (ref 2.5–4.5)
PLATELET # BLD AUTO: 209 K/UL — SIGNIFICANT CHANGE UP (ref 150–400)
POTASSIUM SERPL-MCNC: 4.1 MMOL/L — SIGNIFICANT CHANGE UP (ref 3.5–5.3)
POTASSIUM SERPL-SCNC: 4.1 MMOL/L — SIGNIFICANT CHANGE UP (ref 3.5–5.3)
PROT SERPL-MCNC: 4.9 G/DL — LOW (ref 6–8.3)
PROTHROM AB SERPL-ACNC: 12.8 SEC — SIGNIFICANT CHANGE UP (ref 10.5–13.4)
RBC # BLD: 3.9 M/UL — LOW (ref 4.2–5.8)
RBC # FLD: 14.4 % — SIGNIFICANT CHANGE UP (ref 10.3–14.5)
SODIUM SERPL-SCNC: 137 MMOL/L — SIGNIFICANT CHANGE UP (ref 135–145)
WBC # BLD: 10.64 K/UL — HIGH (ref 3.8–10.5)
WBC # FLD AUTO: 10.64 K/UL — HIGH (ref 3.8–10.5)

## 2022-05-01 PROCEDURE — 99232 SBSQ HOSP IP/OBS MODERATE 35: CPT

## 2022-05-01 RX ADMIN — SENNA PLUS 1 TABLET(S): 8.6 TABLET ORAL at 21:58

## 2022-05-01 RX ADMIN — LACTULOSE 5 GRAM(S): 10 SOLUTION ORAL at 11:13

## 2022-05-01 RX ADMIN — GABAPENTIN 100 MILLIGRAM(S): 400 CAPSULE ORAL at 21:58

## 2022-05-01 RX ADMIN — POLYETHYLENE GLYCOL 3350 17 GRAM(S): 17 POWDER, FOR SOLUTION ORAL at 11:12

## 2022-05-01 RX ADMIN — CHLORHEXIDINE GLUCONATE 1 APPLICATION(S): 213 SOLUTION TOPICAL at 05:58

## 2022-05-01 RX ADMIN — ENOXAPARIN SODIUM 40 MILLIGRAM(S): 100 INJECTION SUBCUTANEOUS at 21:57

## 2022-05-01 RX ADMIN — GABAPENTIN 100 MILLIGRAM(S): 400 CAPSULE ORAL at 05:58

## 2022-05-01 RX ADMIN — Medication 3 MILLIGRAM(S): at 21:58

## 2022-05-01 RX ADMIN — PREGABALIN 1000 MICROGRAM(S): 225 CAPSULE ORAL at 11:12

## 2022-05-01 RX ADMIN — Medication 60 MILLIGRAM(S): at 05:58

## 2022-05-01 RX ADMIN — PANTOPRAZOLE SODIUM 40 MILLIGRAM(S): 20 TABLET, DELAYED RELEASE ORAL at 17:43

## 2022-05-01 RX ADMIN — SIMETHICONE 80 MILLIGRAM(S): 80 TABLET, CHEWABLE ORAL at 05:58

## 2022-05-01 RX ADMIN — PANTOPRAZOLE SODIUM 40 MILLIGRAM(S): 20 TABLET, DELAYED RELEASE ORAL at 05:58

## 2022-05-01 RX ADMIN — GABAPENTIN 100 MILLIGRAM(S): 400 CAPSULE ORAL at 13:32

## 2022-05-01 RX ADMIN — SIMETHICONE 80 MILLIGRAM(S): 80 TABLET, CHEWABLE ORAL at 17:43

## 2022-05-01 RX ADMIN — TAMSULOSIN HYDROCHLORIDE 0.4 MILLIGRAM(S): 0.4 CAPSULE ORAL at 21:58

## 2022-05-01 NOTE — PROGRESS NOTE ADULT - ASSESSMENT
Longitudinally extensive transverse myelitis, likely post infectious   NMO and MOG antibodies negative  s/p course of IV steroids and IVIG  Now on Plasmapheresis, completed 6/7 sessions, last treatment tomorrow  Decrease prednisone to 50mg daily today, and lower by 10mg every week until off   Continue other meds  Dispo planning to acute rehab after treatment complete

## 2022-05-01 NOTE — PROGRESS NOTE ADULT - SUBJECTIVE AND OBJECTIVE BOX
Interval history: no new complaints. tolerated PLEX well yesterday.     Vitals:  Vital Signs Last 24 Hrs  T(C): 36.4 (01 May 2022 12:43), Max: 37.1 (30 Apr 2022 20:10)  T(F): 97.6 (01 May 2022 12:43), Max: 98.7 (30 Apr 2022 20:10)  HR: 72 (01 May 2022 12:43) (70 - 77)  BP: 122/74 (01 May 2022 12:43) (117/67 - 128/76)  BP(mean): --  RR: 16 (01 May 2022 12:43) (16 - 18)  SpO2: 97% (01 May 2022 12:43) (96% - 97%)    Exam:  Hip flexion 3/5, knee extension 3/5 ankle dorsiflexion 4/5 b/l  JPS impaired at toes b/l    Labs:  05-01    137  |  103  |  18  ----------------------------<  99  4.1   |  26  |  1.09    Ca    8.5      01 May 2022 07:35  Phos  3.9     05-01  Mg     2.1     05-01    TPro  4.9<L>  /  Alb  4.5  /  TBili  0.5  /  DBili  x   /  AST  25  /  ALT  29  /  AlkPhos  23<L>  05-01                        11.0   10.64 )-----------( 209      ( 01 May 2022 07:35 )             35.1   Fibrinogen Johnson (05.01.22 @ 07:35): 135 Interval history: no new complaints. tolerated PLEX well yesterday.     Vitals:  Vital Signs Last 24 Hrs  T(C): 36.4 (01 May 2022 12:43), Max: 37.1 (30 Apr 2022 20:10)  T(F): 97.6 (01 May 2022 12:43), Max: 98.7 (30 Apr 2022 20:10)  HR: 72 (01 May 2022 12:43) (70 - 77)  BP: 122/74 (01 May 2022 12:43) (117/67 - 128/76)  BP(mean): --  RR: 16 (01 May 2022 12:43) (16 - 18)  SpO2: 97% (01 May 2022 12:43) (96% - 97%)    Exam:  Hip flexion 3/5, knee extension 3/5 ankle dorsiflexion 4/5 b/l  JPS impaired at toes b/l    Labs:  05-01    137  |  103  |  18  ----------------------------<  99  4.1   |  26  |  1.09    Ca    8.5      01 May 2022 07:35  Phos  3.9     05-01  Mg     2.1     05-01    TPro  4.9<L>  /  Alb  4.5  /  TBili  0.5  /  DBili  x   /  AST  25  /  ALT  29  /  AlkPhos  23<L>  05-01                        11.0   10.64 )-----------( 209      ( 01 May 2022 07:35 )             35.1   Fibrinogen Clauss (05.01.22 @ 07:35): 135    MEDICATIONS  (STANDING):  chlorhexidine 2% Cloths 1 Application(s) Topical <User Schedule>  cyanocobalamin 1000 MICROGram(s) Oral daily  enoxaparin Injectable 40 milliGRAM(s) SubCutaneous every 24 hours  gabapentin 100 milliGRAM(s) Oral three times a day  lactulose Syrup 5 Gram(s) Oral daily  melatonin 3 milliGRAM(s) Oral at bedtime  pantoprazole    Tablet 40 milliGRAM(s) Oral two times a day  polyethylene glycol 3350 17 Gram(s) Oral daily  senna 1 Tablet(s) Oral at bedtime  simethicone 80 milliGRAM(s) Chew two times a day  tamsulosin 0.4 milliGRAM(s) Oral at bedtime    MEDICATIONS  (PRN):  acetaminophen     Tablet .. 650 milliGRAM(s) Oral every 6 hours PRN Temp greater or equal to 38C (100.4F), Mild Pain (1 - 3)  sodium chloride 0.9% lock flush 10 milliLiter(s) IV Push every 1 hour PRN Pre/post blood products, medications, blood draw, and to maintain line patency

## 2022-05-02 LAB
ALBUMIN SERPL ELPH-MCNC: 4.4 G/DL — SIGNIFICANT CHANGE UP (ref 3.3–5)
ALP SERPL-CCNC: 33 U/L — LOW (ref 40–120)
ALT FLD-CCNC: 44 U/L — SIGNIFICANT CHANGE UP (ref 10–45)
ANION GAP SERPL CALC-SCNC: 8 MMOL/L — SIGNIFICANT CHANGE UP (ref 5–17)
APTT BLD: 27.5 SEC — SIGNIFICANT CHANGE UP (ref 27.5–35.5)
AST SERPL-CCNC: 32 U/L — SIGNIFICANT CHANGE UP (ref 10–40)
BASOPHILS # BLD AUTO: 0.01 K/UL — SIGNIFICANT CHANGE UP (ref 0–0.2)
BASOPHILS NFR BLD AUTO: 0.1 % — SIGNIFICANT CHANGE UP (ref 0–2)
BILIRUB SERPL-MCNC: 0.6 MG/DL — SIGNIFICANT CHANGE UP (ref 0.2–1.2)
BUN SERPL-MCNC: 23 MG/DL — SIGNIFICANT CHANGE UP (ref 7–23)
CALCIUM SERPL-MCNC: 8.9 MG/DL — SIGNIFICANT CHANGE UP (ref 8.4–10.5)
CHLORIDE SERPL-SCNC: 102 MMOL/L — SIGNIFICANT CHANGE UP (ref 96–108)
CO2 SERPL-SCNC: 28 MMOL/L — SIGNIFICANT CHANGE UP (ref 22–31)
CREAT SERPL-MCNC: 1.12 MG/DL — SIGNIFICANT CHANGE UP (ref 0.5–1.3)
EGFR: 71 ML/MIN/1.73M2 — SIGNIFICANT CHANGE UP
EOSINOPHIL # BLD AUTO: 0.03 K/UL — SIGNIFICANT CHANGE UP (ref 0–0.5)
EOSINOPHIL NFR BLD AUTO: 0.2 % — SIGNIFICANT CHANGE UP (ref 0–6)
FIBRINOGEN PPP-MCNC: 161 MG/DL — LOW (ref 258–438)
GLUCOSE SERPL-MCNC: 103 MG/DL — HIGH (ref 70–99)
HCT VFR BLD CALC: 36.8 % — LOW (ref 39–50)
HGB BLD-MCNC: 11.7 G/DL — LOW (ref 13–17)
IMM GRANULOCYTES NFR BLD AUTO: 2.4 % — HIGH (ref 0–1.5)
INR BLD: 0.95 — SIGNIFICANT CHANGE UP (ref 0.88–1.16)
LYMPHOCYTES # BLD AUTO: 1.61 K/UL — SIGNIFICANT CHANGE UP (ref 1–3.3)
LYMPHOCYTES # BLD AUTO: 13.1 % — SIGNIFICANT CHANGE UP (ref 13–44)
MAGNESIUM SERPL-MCNC: 2.2 MG/DL — SIGNIFICANT CHANGE UP (ref 1.6–2.6)
MCHC RBC-ENTMCNC: 28.9 PG — SIGNIFICANT CHANGE UP (ref 27–34)
MCHC RBC-ENTMCNC: 31.8 GM/DL — LOW (ref 32–36)
MCV RBC AUTO: 90.9 FL — SIGNIFICANT CHANGE UP (ref 80–100)
MONOCYTES # BLD AUTO: 0.52 K/UL — SIGNIFICANT CHANGE UP (ref 0–0.9)
MONOCYTES NFR BLD AUTO: 4.2 % — SIGNIFICANT CHANGE UP (ref 2–14)
NEUTROPHILS # BLD AUTO: 9.87 K/UL — HIGH (ref 1.8–7.4)
NEUTROPHILS NFR BLD AUTO: 80 % — HIGH (ref 43–77)
NRBC # BLD: 0 /100 WBCS — SIGNIFICANT CHANGE UP (ref 0–0)
PHOSPHATE SERPL-MCNC: 3.9 MG/DL — SIGNIFICANT CHANGE UP (ref 2.5–4.5)
PLATELET # BLD AUTO: 189 K/UL — SIGNIFICANT CHANGE UP (ref 150–400)
POTASSIUM SERPL-MCNC: 4 MMOL/L — SIGNIFICANT CHANGE UP (ref 3.5–5.3)
POTASSIUM SERPL-SCNC: 4 MMOL/L — SIGNIFICANT CHANGE UP (ref 3.5–5.3)
PROT SERPL-MCNC: 5.3 G/DL — LOW (ref 6–8.3)
PROTHROM AB SERPL-ACNC: 11.3 SEC — SIGNIFICANT CHANGE UP (ref 10.5–13.4)
RBC # BLD: 4.05 M/UL — LOW (ref 4.2–5.8)
RBC # FLD: 14.6 % — HIGH (ref 10.3–14.5)
SARS-COV-2 RNA SPEC QL NAA+PROBE: SIGNIFICANT CHANGE UP
SODIUM SERPL-SCNC: 138 MMOL/L — SIGNIFICANT CHANGE UP (ref 135–145)
WBC # BLD: 12.33 K/UL — HIGH (ref 3.8–10.5)
WBC # FLD AUTO: 12.33 K/UL — HIGH (ref 3.8–10.5)

## 2022-05-02 PROCEDURE — 99233 SBSQ HOSP IP/OBS HIGH 50: CPT

## 2022-05-02 RX ADMIN — ENOXAPARIN SODIUM 40 MILLIGRAM(S): 100 INJECTION SUBCUTANEOUS at 21:10

## 2022-05-02 RX ADMIN — GABAPENTIN 100 MILLIGRAM(S): 400 CAPSULE ORAL at 05:31

## 2022-05-02 RX ADMIN — Medication 3 MILLIGRAM(S): at 21:10

## 2022-05-02 RX ADMIN — TAMSULOSIN HYDROCHLORIDE 0.4 MILLIGRAM(S): 0.4 CAPSULE ORAL at 21:10

## 2022-05-02 RX ADMIN — GABAPENTIN 100 MILLIGRAM(S): 400 CAPSULE ORAL at 14:03

## 2022-05-02 RX ADMIN — PREGABALIN 1000 MICROGRAM(S): 225 CAPSULE ORAL at 12:04

## 2022-05-02 RX ADMIN — PANTOPRAZOLE SODIUM 40 MILLIGRAM(S): 20 TABLET, DELAYED RELEASE ORAL at 05:31

## 2022-05-02 RX ADMIN — CHLORHEXIDINE GLUCONATE 1 APPLICATION(S): 213 SOLUTION TOPICAL at 05:30

## 2022-05-02 RX ADMIN — SIMETHICONE 80 MILLIGRAM(S): 80 TABLET, CHEWABLE ORAL at 18:24

## 2022-05-02 RX ADMIN — LACTULOSE 5 GRAM(S): 10 SOLUTION ORAL at 12:04

## 2022-05-02 RX ADMIN — SIMETHICONE 80 MILLIGRAM(S): 80 TABLET, CHEWABLE ORAL at 05:31

## 2022-05-02 RX ADMIN — PANTOPRAZOLE SODIUM 40 MILLIGRAM(S): 20 TABLET, DELAYED RELEASE ORAL at 18:23

## 2022-05-02 RX ADMIN — Medication 50 MILLIGRAM(S): at 05:31

## 2022-05-02 RX ADMIN — Medication 1625 MILLILITER(S): at 08:24

## 2022-05-02 RX ADMIN — GABAPENTIN 100 MILLIGRAM(S): 400 CAPSULE ORAL at 21:10

## 2022-05-02 RX ADMIN — Medication 200 GRAM(S): at 08:16

## 2022-05-02 NOTE — PROGRESS NOTE ADULT - ASSESSMENT
71 M with no past medical history, not on any medications, transferred here from [facility] for bilateral lower extremity weakness 2/2 Transverse Myelitis of unknown etiology, now on steroids and plasmapharesis

## 2022-05-02 NOTE — PROGRESS NOTE ADULT - PROBLEM SELECTOR PLAN 5
Patient initially to [facility] with urosepsis c/b urinary retention, likely 2/2 neurological process. Nation placed at [facility].   -continue with flomax 0.4 QHS   -neuro workup as above  - patient nation'ed on 4/22 due to retention   - Will do another trial of void prior to discharge - likely monday 5/2 (1 week after prior TOV), continue nation for now     #Fecal retention  Passing gas, incontinent at times, difficulty controlling BMs  - Down on lactulose today    #Complicated UTI   RESOLVED   s/p CTX  - UClx growing >100K ESBL E. Coli, completed Ertapenem 1g q24h x5days (4/20-4/24)

## 2022-05-02 NOTE — CHART NOTE - NSCHARTNOTEFT_GEN_A_CORE
Admitting Diagnosis:   Patient is a 71y old  Male who presents with a chief complaint of transverse myelitis (27 Apr 2022 14:21)      PAST MEDICAL & SURGICAL HISTORY:      Current Nutrition Order:regular       PO Intake: Good (%) [   ]  Fair (50-75%) [ x  ] Poor (<25%) [   ]    GI Issues: Fecal incontinence 5/1    Pain:not noted    Skin Integrity:intact    Labs:   05-02    138  |  102  |  23  ----------------------------<  103<H>  4.0   |  28  |  1.12    Ca    8.9      02 May 2022 08:09  Phos  3.9     05-02  Mg     2.2     05-02    TPro  5.3<L>  /  Alb  4.4  /  TBili  0.6  /  DBili  x   /  AST  32  /  ALT  44  /  AlkPhos  33<L>  05-02    CAPILLARY BLOOD GLUCOSE          Medications:  MEDICATIONS  (STANDING):  chlorhexidine 2% Cloths 1 Application(s) Topical <User Schedule>  cyanocobalamin 1000 MICROGram(s) Oral daily  enoxaparin Injectable 40 milliGRAM(s) SubCutaneous every 24 hours  gabapentin 100 milliGRAM(s) Oral three times a day  heparin  Lock Flush 100 Units/mL Injectable 600 Unit(s) IV Push once  lactulose Syrup 5 Gram(s) Oral daily  melatonin 3 milliGRAM(s) Oral at bedtime  pantoprazole    Tablet 40 milliGRAM(s) Oral two times a day  polyethylene glycol 3350 17 Gram(s) Oral daily  predniSONE   Tablet 50 milliGRAM(s) Oral every 24 hours  senna 1 Tablet(s) Oral at bedtime  simethicone 80 milliGRAM(s) Chew two times a day  tamsulosin 0.4 milliGRAM(s) Oral at bedtime    MEDICATIONS  (PRN):  acetaminophen     Tablet .. 650 milliGRAM(s) Oral every 6 hours PRN Temp greater or equal to 38C (100.4F), Mild Pain (1 - 3)  sodium chloride 0.9% lock flush 10 milliLiter(s) IV Push every 1 hour PRN Pre/post blood products, medications, blood draw, and to maintain line patency      Weight:78kg  Daily     Daily no updated weights    Weight Change: no updated weights,IBW:73kg    Estimated energy needs: Based on ABW due to between % of IBW.ABW:78kg [id number]:1950-2340kcal and 1gmprotein:78gmprotein and 25-30cc fluids:1950-2340cc    Subjective: 71y old  Male who presents with a chief complaint of transverse myelitis .S/P .PLEX 4/30.  This am. reported to be eating adequate amounts of food.  Previous Nutrition Diagnosis :Inadequate oral intake r/t GI distress AEB: po intake 50-75%    Active [ x  ]  Resolved [   ]Generally eating 75%    If resolved, new PES:     Goal:Meet >75% of EER consistently    Recommendations:1.Updated weights    Education: completed    Risk Level: High [   ] Moderate [  x ] Low [   ]

## 2022-05-02 NOTE — PROGRESS NOTE ADULT - PROBLEM SELECTOR PLAN 1
Hx diarrhea two weeks prior to acute LE weakness, imaging consistent with transverse myelitis. Elevated inflammatory markers. Lumbar tap done at [facility]-for which studies pending. s/p x3d high dose steroids with minimal improvement in symptoms.   - s/p IVIG 0.65g/kg x3 days, HD cath placed 4/19 for PLEX starting 4/20 x5 sessions(heme/onc following)  - MR head and CSpine w/wout - faint T2 hyperintensity in posterior lower cervical cord without definite enhancement   - NMO and anti-MOG antibodies negative  - Autoimmune myelopathy panel sent 4/20 6pm  - PT started PLEX therapy 4/20, to be done every other day, for 7 total sessions. Sixth session scheduled for 4/30  - started 60mg PO prednisone daily on 4/24 - decrease by 10mg every week--currently 50mg this week  - to be discharged to acute rehab once treatment is complete--pending auth   - Will do another trial of void likely monday 5/2 (1 week after prior TOV), continue nation for now  - last cryo session 7th session, 7 total, completed today 5/2

## 2022-05-02 NOTE — PROGRESS NOTE ADULT - ATTENDING COMMENTS
Significant improvement in power over the past days, even up to today.  Likely will be able to stand with assistance due to quad and dorsiflexion power.  Vibratory losses to the mid-shin.    Would keep PLEX port in overnight and await re-eval of power tmrw in case of regression and further treatments.  Likely will benefit from acute rehab given improvements but still significant weakness and proprioceptive losses.

## 2022-05-02 NOTE — PROGRESS NOTE ADULT - PROBLEM SELECTOR PLAN 3
RESOLVED. Presented with WBC of 16, elevated at [facility] as well. Could be 2/2 infection-history of UTI at [facility] treated with 5 day course of CTX, denying any complaints. No cough, no persistent GI symptoms, no diarrhea, no vomiting. Could be 2/2 reactive process given neurological symptomology. Could also be 2/2 prednisone.   - UA positive, nation exchanged   - CXR at [facility] clear without infiltrates  - Patient currently constipated

## 2022-05-02 NOTE — PROGRESS NOTE ADULT - PROBLEM SELECTOR PLAN 2
CT AP 2.3 x 2.4 x 3.1 at pancreatic head, as per records, workup initiated at [place]; however, unsure of results.  - Ca-19-9 and CEA negative at [place]   - MR abdomen/pelvis w cont - pancreatic mass indeterminate etiology    - f/u GI recs  - per GI, EGD/EUS procedure to be performed after plasmapheresis and acute neurologic issues resolved. Will defer to outpatient as elective procedure.

## 2022-05-02 NOTE — PROGRESS NOTE ADULT - SUBJECTIVE AND OBJECTIVE BOX
MARCIAL ROMANO, 71y, Male  MRN-[id number]  Patient is a 71y old  Male who presents with a chief complaint of transverse myelitis (27 Apr 2022 14:21)      OVERNIGHT EVENTS: naeo     SUBJECTIVE: Patient seen and examined at bedside. Reports feeling fine. Denies fevers, chills, HA, chest pain, sob, nausea, vomiting, abdominal pain, diarrhea, constipation, dysuria.     12 Point ROS Negative unless noted otherwise above.  -------------------------------------------------------------------------------  VITAL SIGNS:  Vital Signs Last 24 Hrs  T(C): 36.7 (02 May 2022 06:42), Max: 37.1 (01 May 2022 20:57)  T(F): 98 (02 May 2022 06:42), Max: 98.7 (01 May 2022 20:57)  HR: 66 (02 May 2022 06:42) (66 - 79)  BP: 107/63 (02 May 2022 06:42) (102/56 - 122/74)  BP(mean): --  RR: 17 (02 May 2022 06:42) (16 - 18)  SpO2: 97% (02 May 2022 06:42) (95% - 97%)  I&O's Summary    01 May 2022 07:01  -  02 May 2022 07:00  --------------------------------------------------------  IN: 0 mL / OUT: 400 mL / NET: -400 mL        PHYSICAL EXAM:    General: NAD, well developed  HEENT: NC/AT; EOMI, PERRLA, anicteric sclera; moist mucosal membranes.  Neck: supple, trachea midline  Cardiovascular: RRR, +S1/S2; NO M/R/G  Respiratory: CTA B/L; no W/R/R  Gastrointestinal: soft, NT/ND; +BSx4  Extremities: WWP; no edema or cyanosis  Vascular: 2+ radial, DP/PT pulses B/L  Neurological: AAOx3; hip flexion 2/5, knee extension 3/5, ankle dorsiflexion 4/5 b/l; numbness from umbilicus downwards     ALLERGIES:  Allergies    No Known Allergies    Intolerances        MEDICATIONS:  MEDICATIONS  (STANDING):  chlorhexidine 2% Cloths 1 Application(s) Topical <User Schedule>  cyanocobalamin 1000 MICROGram(s) Oral daily  enoxaparin Injectable 40 milliGRAM(s) SubCutaneous every 24 hours  gabapentin 100 milliGRAM(s) Oral three times a day  heparin  Lock Flush 100 Units/mL Injectable 600 Unit(s) IV Push once  lactulose Syrup 5 Gram(s) Oral daily  melatonin 3 milliGRAM(s) Oral at bedtime  pantoprazole    Tablet 40 milliGRAM(s) Oral two times a day  polyethylene glycol 3350 17 Gram(s) Oral daily  predniSONE   Tablet 50 milliGRAM(s) Oral every 24 hours  senna 1 Tablet(s) Oral at bedtime  simethicone 80 milliGRAM(s) Chew two times a day  tamsulosin 0.4 milliGRAM(s) Oral at bedtime    MEDICATIONS  (PRN):  acetaminophen     Tablet .. 650 milliGRAM(s) Oral every 6 hours PRN Temp greater or equal to 38C (100.4F), Mild Pain (1 - 3)  sodium chloride 0.9% lock flush 10 milliLiter(s) IV Push every 1 hour PRN Pre/post blood products, medications, blood draw, and to maintain line patency      -------------------------------------------------------------------------------  LABS:                        11.7   12.33 )-----------( 189      ( 02 May 2022 08:09 )             36.8     05-02    138  |  102  |  23  ----------------------------<  103<H>  4.0   |  28  |  1.12    Ca    8.9      02 May 2022 08:09  Phos  3.9     05-02  Mg     2.2     05-02    TPro  5.3<L>  /  Alb  4.4  /  TBili  0.6  /  DBili  x   /  AST  32  /  ALT  44  /  AlkPhos  33<L>  05-02    LIVER FUNCTIONS - ( 02 May 2022 08:09 )  Alb: 4.4 g/dL / Pro: 5.3 g/dL / ALK PHOS: 33 U/L / ALT: 44 U/L / AST: 32 U/L / GGT: x           PT/INR - ( 02 May 2022 08:09 )   PT: 11.3 sec;   INR: 0.95          PTT - ( 02 May 2022 08:09 )  PTT:27.5 sec    CAPILLARY BLOOD GLUCOSE          COVID-19 PCR: NotDetec (28 Apr 2022 08:54)  COVID-19 PCR: NotDetec (21 Apr 2022 21:40)      RADIOLOGY & ADDITIONAL TESTS: Reviewed.       MARCIAL ROMANO, 71y, Male  MRN-[id number]  Patient is a 71y old  Male who presents with a chief complaint of transverse myelitis (27 Apr 2022 14:21)      OVERNIGHT EVENTS: naeo     SUBJECTIVE: Patient seen and examined at bedside. Reports feeling fine, and overall noticing improved strength through the day, after 7th PLEX treatment.  Denies fevers, chills, HA, chest pain, sob, nausea, vomiting, abdominal pain, diarrhea, constipation, dysuria.     12 Point ROS Negative unless noted otherwise above.  -------------------------------------------------------------------------------  VITAL SIGNS:  Vital Signs Last 24 Hrs  T(C): 36.7 (02 May 2022 06:42), Max: 37.1 (01 May 2022 20:57)  T(F): 98 (02 May 2022 06:42), Max: 98.7 (01 May 2022 20:57)  HR: 66 (02 May 2022 06:42) (66 - 79)  BP: 107/63 (02 May 2022 06:42) (102/56 - 122/74)  BP(mean): --  RR: 17 (02 May 2022 06:42) (16 - 18)  SpO2: 97% (02 May 2022 06:42) (95% - 97%)  I&O's Summary    01 May 2022 07:01  -  02 May 2022 07:00  --------------------------------------------------------  IN: 0 mL / OUT: 400 mL / NET: -400 mL        PHYSICAL EXAM:    General: NAD, well developed  HEENT: NC/AT; EOMI, PERRLA, anicteric sclera; moist mucosal membranes.  Neck: supple, trachea midline  Cardiovascular: RRR, +S1/S2; NO M/R/G  Respiratory: CTA B/L; no W/R/R  Gastrointestinal: soft, NT/ND; +BSx4  Extremities: WWP; no edema or cyanosis  Vascular: 2+ radial, DP/PT pulses B/L  Neurological: AAOx3; hip flexion 2/5, knee extension 3/5, ankle dorsiflexion 4/5 b/l; numbness from umbilicus downwards     Later in the morning:  Bilat HF and HE 4/5  Right quad near full power, hamstring 4+, left quad 4+ to 5- and left hamstring 4+.  Right EHL and dorsiflexion 5/5, left 4/5    ALLERGIES:  Allergies    No Known Allergies    Intolerances        MEDICATIONS:  MEDICATIONS  (STANDING):  chlorhexidine 2% Cloths 1 Application(s) Topical <User Schedule>  cyanocobalamin 1000 MICROGram(s) Oral daily  enoxaparin Injectable 40 milliGRAM(s) SubCutaneous every 24 hours  gabapentin 100 milliGRAM(s) Oral three times a day  heparin  Lock Flush 100 Units/mL Injectable 600 Unit(s) IV Push once  lactulose Syrup 5 Gram(s) Oral daily  melatonin 3 milliGRAM(s) Oral at bedtime  pantoprazole    Tablet 40 milliGRAM(s) Oral two times a day  polyethylene glycol 3350 17 Gram(s) Oral daily  predniSONE   Tablet 50 milliGRAM(s) Oral every 24 hours  senna 1 Tablet(s) Oral at bedtime  simethicone 80 milliGRAM(s) Chew two times a day  tamsulosin 0.4 milliGRAM(s) Oral at bedtime    MEDICATIONS  (PRN):  acetaminophen     Tablet .. 650 milliGRAM(s) Oral every 6 hours PRN Temp greater or equal to 38C (100.4F), Mild Pain (1 - 3)  sodium chloride 0.9% lock flush 10 milliLiter(s) IV Push every 1 hour PRN Pre/post blood products, medications, blood draw, and to maintain line patency      -------------------------------------------------------------------------------  LABS:                        11.7   12.33 )-----------( 189      ( 02 May 2022 08:09 )             36.8     05-02    138  |  102  |  23  ----------------------------<  103<H>  4.0   |  28  |  1.12    Ca    8.9      02 May 2022 08:09  Phos  3.9     05-02  Mg     2.2     05-02    TPro  5.3<L>  /  Alb  4.4  /  TBili  0.6  /  DBili  x   /  AST  32  /  ALT  44  /  AlkPhos  33<L>  05-02    LIVER FUNCTIONS - ( 02 May 2022 08:09 )  Alb: 4.4 g/dL / Pro: 5.3 g/dL / ALK PHOS: 33 U/L / ALT: 44 U/L / AST: 32 U/L / GGT: x           PT/INR - ( 02 May 2022 08:09 )   PT: 11.3 sec;   INR: 0.95          PTT - ( 02 May 2022 08:09 )  PTT:27.5 sec    CAPILLARY BLOOD GLUCOSE          COVID-19 PCR: NotDetec (28 Apr 2022 08:54)  COVID-19 PCR: NotDetec (21 Apr 2022 21:40)      RADIOLOGY & ADDITIONAL TESTS: Reviewed.

## 2022-05-03 LAB
ALBUMIN SERPL ELPH-MCNC: 4.7 G/DL — SIGNIFICANT CHANGE UP (ref 3.3–5)
ALP SERPL-CCNC: 26 U/L — LOW (ref 40–120)
ALT FLD-CCNC: 33 U/L — SIGNIFICANT CHANGE UP (ref 10–45)
ANION GAP SERPL CALC-SCNC: 13 MMOL/L — SIGNIFICANT CHANGE UP (ref 5–17)
APTT BLD: 30 SEC — SIGNIFICANT CHANGE UP (ref 27.5–35.5)
AST SERPL-CCNC: 27 U/L — SIGNIFICANT CHANGE UP (ref 10–40)
BASOPHILS # BLD AUTO: 0 K/UL — SIGNIFICANT CHANGE UP (ref 0–0.2)
BASOPHILS NFR BLD AUTO: 0 % — SIGNIFICANT CHANGE UP (ref 0–2)
BILIRUB SERPL-MCNC: 0.5 MG/DL — SIGNIFICANT CHANGE UP (ref 0.2–1.2)
BUN SERPL-MCNC: 20 MG/DL — SIGNIFICANT CHANGE UP (ref 7–23)
CALCIUM SERPL-MCNC: 8.8 MG/DL — SIGNIFICANT CHANGE UP (ref 8.4–10.5)
CHLORIDE SERPL-SCNC: 103 MMOL/L — SIGNIFICANT CHANGE UP (ref 96–108)
CO2 SERPL-SCNC: 24 MMOL/L — SIGNIFICANT CHANGE UP (ref 22–31)
CREAT SERPL-MCNC: 1.03 MG/DL — SIGNIFICANT CHANGE UP (ref 0.5–1.3)
EGFR: 78 ML/MIN/1.73M2 — SIGNIFICANT CHANGE UP
EOSINOPHIL # BLD AUTO: 0.22 K/UL — SIGNIFICANT CHANGE UP (ref 0–0.5)
EOSINOPHIL NFR BLD AUTO: 1.8 % — SIGNIFICANT CHANGE UP (ref 0–6)
FERRITIN SERPL-MCNC: 190 NG/ML — SIGNIFICANT CHANGE UP (ref 30–400)
GIANT PLATELETS BLD QL SMEAR: PRESENT — SIGNIFICANT CHANGE UP
GLUCOSE SERPL-MCNC: 92 MG/DL — SIGNIFICANT CHANGE UP (ref 70–99)
HCT VFR BLD CALC: 35.7 % — LOW (ref 39–50)
HGB BLD-MCNC: 11.4 G/DL — LOW (ref 13–17)
HYPOCHROMIA BLD QL: SLIGHT — SIGNIFICANT CHANGE UP
INR BLD: 1.12 — SIGNIFICANT CHANGE UP (ref 0.88–1.16)
LYMPHOCYTES # BLD AUTO: 26.1 % — SIGNIFICANT CHANGE UP (ref 13–44)
LYMPHOCYTES # BLD AUTO: 3.18 K/UL — SIGNIFICANT CHANGE UP (ref 1–3.3)
MAGNESIUM SERPL-MCNC: 2.2 MG/DL — SIGNIFICANT CHANGE UP (ref 1.6–2.6)
MANUAL SMEAR VERIFICATION: SIGNIFICANT CHANGE UP
MCHC RBC-ENTMCNC: 28.5 PG — SIGNIFICANT CHANGE UP (ref 27–34)
MCHC RBC-ENTMCNC: 31.9 GM/DL — LOW (ref 32–36)
MCV RBC AUTO: 89.3 FL — SIGNIFICANT CHANGE UP (ref 80–100)
MONOCYTES # BLD AUTO: 0.21 K/UL — SIGNIFICANT CHANGE UP (ref 0–0.9)
MONOCYTES NFR BLD AUTO: 1.7 % — LOW (ref 2–14)
MYELOCYTES NFR BLD: 1.7 % — HIGH (ref 0–0)
NEUTROPHILS # BLD AUTO: 8.37 K/UL — HIGH (ref 1.8–7.4)
NEUTROPHILS NFR BLD AUTO: 68.7 % — SIGNIFICANT CHANGE UP (ref 43–77)
PHOSPHATE SERPL-MCNC: 3.8 MG/DL — SIGNIFICANT CHANGE UP (ref 2.5–4.5)
PLAT MORPH BLD: NORMAL — SIGNIFICANT CHANGE UP
PLATELET # BLD AUTO: 204 K/UL — SIGNIFICANT CHANGE UP (ref 150–400)
POIKILOCYTOSIS BLD QL AUTO: SLIGHT — SIGNIFICANT CHANGE UP
POTASSIUM SERPL-MCNC: 4 MMOL/L — SIGNIFICANT CHANGE UP (ref 3.5–5.3)
POTASSIUM SERPL-SCNC: 4 MMOL/L — SIGNIFICANT CHANGE UP (ref 3.5–5.3)
PROT SERPL-MCNC: 5.2 G/DL — LOW (ref 6–8.3)
PROTHROM AB SERPL-ACNC: 13.4 SEC — SIGNIFICANT CHANGE UP (ref 10.5–13.4)
RBC # BLD: 4 M/UL — LOW (ref 4.2–5.8)
RBC # FLD: 14.7 % — HIGH (ref 10.3–14.5)
RBC BLD AUTO: ABNORMAL
SODIUM SERPL-SCNC: 140 MMOL/L — SIGNIFICANT CHANGE UP (ref 135–145)
SPHEROCYTES BLD QL SMEAR: SLIGHT — SIGNIFICANT CHANGE UP
WBC # BLD: 12.18 K/UL — HIGH (ref 3.8–10.5)
WBC # FLD AUTO: 12.18 K/UL — HIGH (ref 3.8–10.5)

## 2022-05-03 PROCEDURE — 99231 SBSQ HOSP IP/OBS SF/LOW 25: CPT

## 2022-05-03 RX ORDER — LANOLIN ALCOHOL/MO/W.PET/CERES
5 CREAM (GRAM) TOPICAL AT BEDTIME
Refills: 0 | Status: DISCONTINUED | OUTPATIENT
Start: 2022-05-03 | End: 2022-05-09

## 2022-05-03 RX ADMIN — ENOXAPARIN SODIUM 40 MILLIGRAM(S): 100 INJECTION SUBCUTANEOUS at 21:57

## 2022-05-03 RX ADMIN — Medication 50 MILLIGRAM(S): at 05:36

## 2022-05-03 RX ADMIN — LACTULOSE 5 GRAM(S): 10 SOLUTION ORAL at 13:20

## 2022-05-03 RX ADMIN — TAMSULOSIN HYDROCHLORIDE 0.4 MILLIGRAM(S): 0.4 CAPSULE ORAL at 21:57

## 2022-05-03 RX ADMIN — GABAPENTIN 100 MILLIGRAM(S): 400 CAPSULE ORAL at 05:36

## 2022-05-03 RX ADMIN — Medication 5 MILLIGRAM(S): at 23:07

## 2022-05-03 RX ADMIN — SIMETHICONE 80 MILLIGRAM(S): 80 TABLET, CHEWABLE ORAL at 05:35

## 2022-05-03 RX ADMIN — SENNA PLUS 1 TABLET(S): 8.6 TABLET ORAL at 21:58

## 2022-05-03 RX ADMIN — GABAPENTIN 100 MILLIGRAM(S): 400 CAPSULE ORAL at 13:28

## 2022-05-03 RX ADMIN — GABAPENTIN 100 MILLIGRAM(S): 400 CAPSULE ORAL at 21:57

## 2022-05-03 RX ADMIN — SIMETHICONE 80 MILLIGRAM(S): 80 TABLET, CHEWABLE ORAL at 18:46

## 2022-05-03 RX ADMIN — PREGABALIN 1000 MICROGRAM(S): 225 CAPSULE ORAL at 13:20

## 2022-05-03 RX ADMIN — CHLORHEXIDINE GLUCONATE 1 APPLICATION(S): 213 SOLUTION TOPICAL at 05:36

## 2022-05-03 RX ADMIN — PANTOPRAZOLE SODIUM 40 MILLIGRAM(S): 20 TABLET, DELAYED RELEASE ORAL at 18:45

## 2022-05-03 RX ADMIN — PANTOPRAZOLE SODIUM 40 MILLIGRAM(S): 20 TABLET, DELAYED RELEASE ORAL at 05:36

## 2022-05-03 NOTE — PROGRESS NOTE ADULT - PROBLEM SELECTOR PLAN 1
Hx diarrhea two weeks prior to acute LE weakness, imaging consistent with transverse myelitis. Elevated inflammatory markers. Lumbar tap done at [facility]-for which studies pending. MR head and CSpine w/wout - faint T2 hyperintensity in posterior lower cervical cord without definite enhancement. NMO and anti-MOG antibodies negative  - s/p x3d high dose steroids with minimal improvement in symptoms.   - s/p IVIG 0.65g/kg x3 days with minimal improvement in symptoms.  - Autoimmune myelopathy panel sent 4/20 6pm  - HD cath placed 4/19 for PLEX starting 4/20 x7 sessions every other day with heme onc--patient completed course on 5/2 with improvement in LE strength   - started 60mg PO prednisone daily on 4/24 - decrease by 10mg every week--currently 50mg this week of 5/1   - failed TOV on 4/25, will continue nation, will do TOV at acute rehab  - to be discharged to acute rehab once treatment is complete--pending auth

## 2022-05-03 NOTE — PROGRESS NOTE ADULT - PROBLEM SELECTOR PROBLEM 5
Preventive measure
Urinary retention

## 2022-05-03 NOTE — PROGRESS NOTE ADULT - SUBJECTIVE AND OBJECTIVE BOX
MARCIAL ROMANO, 71y, Male  MRN-[id number]  Patient is a 71y old  Male who presents with a chief complaint of transverse myelitis (27 Apr 2022 14:21)      OVERNIGHT EVENTS: naeo     SUBJECTIVE: Patient seen and examined at bedside. Reports feeling fine. Denies fevers, chills, HA, chest pain, sob, nausea, vomiting, abdominal pain, diarrhea, constipation, dysuria.     12 Point ROS Negative unless noted otherwise above.  -------------------------------------------------------------------------------  VITAL SIGNS:  Vital Signs Last 24 Hrs  T(C): 36.6 (03 May 2022 13:37), Max: 37 (03 May 2022 06:07)  T(F): 97.8 (03 May 2022 13:37), Max: 98.6 (03 May 2022 06:07)  HR: 70 (03 May 2022 13:37) (67 - 74)  BP: 114/62 (03 May 2022 13:37) (103/60 - 118/68)  BP(mean): --  RR: 18 (03 May 2022 13:37) (18 - 18)  SpO2: 96% (03 May 2022 13:37) (96% - 98%)  I&O's Summary      PHYSICAL EXAM:    General: pleasant man NAD, well developed  HEENT: NC/AT; EOMI, PERRLA, anicteric sclera; moist mucosal membranes.  Neck: supple, trachea midline  Cardiovascular: RRR, +S1/S2; NO M/R/G  Respiratory: CTA B/L; no W/R/R  Gastrointestinal: distended, on palpation feels like gas, no tenderness, soft, +BS in all 4 quadrants  Extremities: WWP; no edema or cyanosis  Vascular: 2+ radial, DP/PT pulses B/L  Neurological: AAOx3; improving strength, sensation still not improving     ALLERGIES:  Allergies    No Known Allergies    Intolerances        MEDICATIONS:  MEDICATIONS  (STANDING):  chlorhexidine 2% Cloths 1 Application(s) Topical <User Schedule>  cyanocobalamin 1000 MICROGram(s) Oral daily  enoxaparin Injectable 40 milliGRAM(s) SubCutaneous every 24 hours  gabapentin 100 milliGRAM(s) Oral three times a day  heparin  Lock Flush 100 Units/mL Injectable 600 Unit(s) IV Push once  lactulose Syrup 5 Gram(s) Oral daily  melatonin 3 milliGRAM(s) Oral at bedtime  pantoprazole    Tablet 40 milliGRAM(s) Oral two times a day  polyethylene glycol 3350 17 Gram(s) Oral daily  predniSONE   Tablet 50 milliGRAM(s) Oral every 24 hours  senna 1 Tablet(s) Oral at bedtime  simethicone 80 milliGRAM(s) Chew two times a day  tamsulosin 0.4 milliGRAM(s) Oral at bedtime    MEDICATIONS  (PRN):  acetaminophen     Tablet .. 650 milliGRAM(s) Oral every 6 hours PRN Temp greater or equal to 38C (100.4F), Mild Pain (1 - 3)  sodium chloride 0.9% lock flush 10 milliLiter(s) IV Push every 1 hour PRN Pre/post blood products, medications, blood draw, and to maintain line patency      -------------------------------------------------------------------------------  LABS:                        11.4   12.18 )-----------( 204      ( 03 May 2022 06:20 )             35.7     05-03    140  |  103  |  20  ----------------------------<  92  4.0   |  24  |  1.03    Ca    8.8      03 May 2022 06:20  Phos  3.8     05-03  Mg     2.2     05-03    TPro  5.2<L>  /  Alb  4.7  /  TBili  0.5  /  DBili  x   /  AST  27  /  ALT  33  /  AlkPhos  26<L>  05-03    LIVER FUNCTIONS - ( 03 May 2022 06:20 )  Alb: 4.7 g/dL / Pro: 5.2 g/dL / ALK PHOS: 26 U/L / ALT: 33 U/L / AST: 27 U/L / GGT: x           PT/INR - ( 03 May 2022 06:20 )   PT: 13.4 sec;   INR: 1.12          PTT - ( 03 May 2022 06:20 )  PTT:30.0 sec    CAPILLARY BLOOD GLUCOSE          COVID-19 PCR: NotDetec (02 May 2022 14:17)  COVID-19 PCR: NotDetec (28 Apr 2022 08:54)  COVID-19 PCR: NotDetec (21 Apr 2022 21:40)      RADIOLOGY & ADDITIONAL TESTS: Reviewed.

## 2022-05-03 NOTE — CHART NOTE - NSCHARTNOTEFT_GEN_A_CORE
Right IJ HD catheter removed. Pressure applied until hemostasis achieved. No hematoma or swelling. Patient tolerated well. Primary team notified.

## 2022-05-03 NOTE — PROGRESS NOTE ADULT - PROBLEM SELECTOR PLAN 4
Patient initially to [facility] with urosepsis c/b urinary retention, likely 2/2 neurological process. Nation placed at [facility].   -continue with flomax 0.4 QHS   -neuro workup as above  - patient nation'ed on 4/22 due to retention   - failed TOV on 4/25, will continue nation, will do TOV at acute rehab      #Fecal retention  Passing gas, incontinent at times, difficulty controlling BMs  - lactulose 5g daily   - miralax 17g daily   - senna 1 tablet qhs     #Complicated UTI   RESOLVED   s/p CTX  - UClx growing >100K ESBL E. Coli, completed Ertapenem 1g q24h x5days (4/20-4/24)

## 2022-05-03 NOTE — PROGRESS NOTE ADULT - PROBLEM SELECTOR PLAN 2
CT AP 2.3 x 2.4 x 3.1 at pancreatic head, as per records, workup initiated at [facility]; however, unsure of results.  - Ca-19-9 and CEA negative at [facility]   - MR abdomen/pelvis w cont - pancreatic mass indeterminate etiology    - f/u GI recs  - per GI, EGD/EUS procedure to be performed after plasmapheresis and acute neurologic issues resolved. Will defer to outpatient as elective procedure.

## 2022-05-03 NOTE — PROGRESS NOTE ADULT - ATTENDING COMMENTS
Reports that his strength is improving, but walking remains difficult because of impaired sensation.  On exam hip flexion is 4/5, knee flexion and extension 5/5, plantarflexion and dorsiflexion 4/5 bilaterally.  Vibration absent at toes but present at ankles.  Continue PT/OT.  Remove PLEX catheter.  Awaiting rehab placement.  Pancreatic mass work up likely outpatient, unless he is going to remain in house for several days.

## 2022-05-03 NOTE — PROGRESS NOTE ADULT - ASSESSMENT
71 M with no past medical history, not on any medications, transferred here from [facility] for bilateral lower extremity weakness 2/2 Transverse Myelitis of unknown etiology, now on steroids and plasmapharesis

## 2022-05-04 LAB
ALBUMIN SERPL ELPH-MCNC: 4.8 G/DL — SIGNIFICANT CHANGE UP (ref 3.3–5)
ALP SERPL-CCNC: 37 U/L — LOW (ref 40–120)
ALT FLD-CCNC: 51 U/L — HIGH (ref 10–45)
ANION GAP SERPL CALC-SCNC: 10 MMOL/L — SIGNIFICANT CHANGE UP (ref 5–17)
APTT BLD: 26.2 SEC — LOW (ref 27.5–35.5)
AST SERPL-CCNC: 34 U/L — SIGNIFICANT CHANGE UP (ref 10–40)
BASOPHILS # BLD AUTO: 0.02 K/UL — SIGNIFICANT CHANGE UP (ref 0–0.2)
BASOPHILS NFR BLD AUTO: 0.2 % — SIGNIFICANT CHANGE UP (ref 0–2)
BILIRUB SERPL-MCNC: 0.8 MG/DL — SIGNIFICANT CHANGE UP (ref 0.2–1.2)
BUN SERPL-MCNC: 21 MG/DL — SIGNIFICANT CHANGE UP (ref 7–23)
CALCIUM SERPL-MCNC: 9.2 MG/DL — SIGNIFICANT CHANGE UP (ref 8.4–10.5)
CHLORIDE SERPL-SCNC: 100 MMOL/L — SIGNIFICANT CHANGE UP (ref 96–108)
CO2 SERPL-SCNC: 27 MMOL/L — SIGNIFICANT CHANGE UP (ref 22–31)
CREAT SERPL-MCNC: 1.05 MG/DL — SIGNIFICANT CHANGE UP (ref 0.5–1.3)
EGFR: 76 ML/MIN/1.73M2 — SIGNIFICANT CHANGE UP
EOSINOPHIL # BLD AUTO: 0.02 K/UL — SIGNIFICANT CHANGE UP (ref 0–0.5)
EOSINOPHIL NFR BLD AUTO: 0.2 % — SIGNIFICANT CHANGE UP (ref 0–6)
FIBRINOGEN PPP-MCNC: 165 MG/DL — LOW (ref 258–438)
GLUCOSE SERPL-MCNC: 108 MG/DL — HIGH (ref 70–99)
HCT VFR BLD CALC: 38.8 % — LOW (ref 39–50)
HGB BLD-MCNC: 12.1 G/DL — LOW (ref 13–17)
IMM GRANULOCYTES NFR BLD AUTO: 2.3 % — HIGH (ref 0–1.5)
INR BLD: 0.96 — SIGNIFICANT CHANGE UP (ref 0.88–1.16)
LYMPHOCYTES # BLD AUTO: 1.13 K/UL — SIGNIFICANT CHANGE UP (ref 1–3.3)
LYMPHOCYTES # BLD AUTO: 10.1 % — LOW (ref 13–44)
MAGNESIUM SERPL-MCNC: 2.2 MG/DL — SIGNIFICANT CHANGE UP (ref 1.6–2.6)
MCHC RBC-ENTMCNC: 28.5 PG — SIGNIFICANT CHANGE UP (ref 27–34)
MCHC RBC-ENTMCNC: 31.2 GM/DL — LOW (ref 32–36)
MCV RBC AUTO: 91.3 FL — SIGNIFICANT CHANGE UP (ref 80–100)
MONOCYTES # BLD AUTO: 0.32 K/UL — SIGNIFICANT CHANGE UP (ref 0–0.9)
MONOCYTES NFR BLD AUTO: 2.9 % — SIGNIFICANT CHANGE UP (ref 2–14)
NEUTROPHILS # BLD AUTO: 9.39 K/UL — HIGH (ref 1.8–7.4)
NEUTROPHILS NFR BLD AUTO: 84.3 % — HIGH (ref 43–77)
NRBC # BLD: 0 /100 WBCS — SIGNIFICANT CHANGE UP (ref 0–0)
PHOSPHATE SERPL-MCNC: 4.2 MG/DL — SIGNIFICANT CHANGE UP (ref 2.5–4.5)
PLATELET # BLD AUTO: 218 K/UL — SIGNIFICANT CHANGE UP (ref 150–400)
POTASSIUM SERPL-MCNC: 4.2 MMOL/L — SIGNIFICANT CHANGE UP (ref 3.5–5.3)
POTASSIUM SERPL-SCNC: 4.2 MMOL/L — SIGNIFICANT CHANGE UP (ref 3.5–5.3)
PROT SERPL-MCNC: 5.9 G/DL — LOW (ref 6–8.3)
PROTHROM AB SERPL-ACNC: 11.4 SEC — SIGNIFICANT CHANGE UP (ref 10.5–13.4)
RBC # BLD: 4.25 M/UL — SIGNIFICANT CHANGE UP (ref 4.2–5.8)
RBC # FLD: 15 % — HIGH (ref 10.3–14.5)
SODIUM SERPL-SCNC: 137 MMOL/L — SIGNIFICANT CHANGE UP (ref 135–145)
WBC # BLD: 11.14 K/UL — HIGH (ref 3.8–10.5)
WBC # FLD AUTO: 11.14 K/UL — HIGH (ref 3.8–10.5)

## 2022-05-04 PROCEDURE — 99231 SBSQ HOSP IP/OBS SF/LOW 25: CPT

## 2022-05-04 RX ORDER — SENNA PLUS 8.6 MG/1
1 TABLET ORAL
Qty: 0 | Refills: 0 | DISCHARGE
Start: 2022-05-04

## 2022-05-04 RX ORDER — GABAPENTIN 400 MG/1
1 CAPSULE ORAL
Qty: 0 | Refills: 0 | DISCHARGE
Start: 2022-05-04

## 2022-05-04 RX ORDER — PREGABALIN 225 MG/1
1 CAPSULE ORAL
Qty: 0 | Refills: 0 | DISCHARGE
Start: 2022-05-04

## 2022-05-04 RX ORDER — LANOLIN ALCOHOL/MO/W.PET/CERES
1 CREAM (GRAM) TOPICAL
Qty: 0 | Refills: 0 | DISCHARGE
Start: 2022-05-04

## 2022-05-04 RX ORDER — LACTULOSE 10 G/15ML
7.5 SOLUTION ORAL
Qty: 0 | Refills: 0 | DISCHARGE
Start: 2022-05-04

## 2022-05-04 RX ORDER — POLYETHYLENE GLYCOL 3350 17 G/17G
17 POWDER, FOR SOLUTION ORAL
Qty: 0 | Refills: 0 | DISCHARGE
Start: 2022-05-04

## 2022-05-04 RX ORDER — PANTOPRAZOLE SODIUM 20 MG/1
1 TABLET, DELAYED RELEASE ORAL
Qty: 0 | Refills: 0 | DISCHARGE
Start: 2022-05-04

## 2022-05-04 RX ORDER — TAMSULOSIN HYDROCHLORIDE 0.4 MG/1
1 CAPSULE ORAL
Qty: 0 | Refills: 0 | DISCHARGE
Start: 2022-05-04

## 2022-05-04 RX ORDER — SIMETHICONE 80 MG/1
1 TABLET, CHEWABLE ORAL
Qty: 0 | Refills: 0 | DISCHARGE
Start: 2022-05-04

## 2022-05-04 RX ADMIN — GABAPENTIN 100 MILLIGRAM(S): 400 CAPSULE ORAL at 22:12

## 2022-05-04 RX ADMIN — LACTULOSE 5 GRAM(S): 10 SOLUTION ORAL at 11:22

## 2022-05-04 RX ADMIN — GABAPENTIN 100 MILLIGRAM(S): 400 CAPSULE ORAL at 14:41

## 2022-05-04 RX ADMIN — SENNA PLUS 1 TABLET(S): 8.6 TABLET ORAL at 22:13

## 2022-05-04 RX ADMIN — Medication 50 MILLIGRAM(S): at 05:26

## 2022-05-04 RX ADMIN — TAMSULOSIN HYDROCHLORIDE 0.4 MILLIGRAM(S): 0.4 CAPSULE ORAL at 22:12

## 2022-05-04 RX ADMIN — SIMETHICONE 80 MILLIGRAM(S): 80 TABLET, CHEWABLE ORAL at 17:18

## 2022-05-04 RX ADMIN — PANTOPRAZOLE SODIUM 40 MILLIGRAM(S): 20 TABLET, DELAYED RELEASE ORAL at 17:18

## 2022-05-04 RX ADMIN — SIMETHICONE 80 MILLIGRAM(S): 80 TABLET, CHEWABLE ORAL at 05:27

## 2022-05-04 RX ADMIN — GABAPENTIN 100 MILLIGRAM(S): 400 CAPSULE ORAL at 05:27

## 2022-05-04 RX ADMIN — ENOXAPARIN SODIUM 40 MILLIGRAM(S): 100 INJECTION SUBCUTANEOUS at 22:12

## 2022-05-04 RX ADMIN — Medication 5 MILLIGRAM(S): at 22:12

## 2022-05-04 RX ADMIN — PANTOPRAZOLE SODIUM 40 MILLIGRAM(S): 20 TABLET, DELAYED RELEASE ORAL at 05:26

## 2022-05-04 RX ADMIN — PREGABALIN 1000 MICROGRAM(S): 225 CAPSULE ORAL at 11:22

## 2022-05-04 NOTE — PROGRESS NOTE ADULT - ASSESSMENT
71 M with no past medical history, not on any medications, transferred here from [facility] for bilateral lower extremity weakness 2/2 Transverse Myelitis of unknown etiology, now on steroids and plasmapharesis, currently with improving strength but no improvement in sensation

## 2022-05-04 NOTE — PROGRESS NOTE ADULT - PROBLEM SELECTOR PLAN 1
Hx diarrhea two weeks prior to acute LE weakness, imaging consistent with transverse myelitis. Elevated inflammatory markers. Lumbar tap done at [facility]-for which studies pending. MR head and CSpine w/wout - faint T2 hyperintensity in posterior lower cervical cord without definite enhancement. NMO and anti-MOG antibodies negative  - s/p x3d high dose steroids with minimal improvement in symptoms.   - s/p IVIG 0.65g/kg x3 days with minimal improvement in symptoms.  - Autoimmune myelopathy panel sent 4/20 6pm  - HD cath placed 4/19 for PLEX starting 4/20 x7 sessions every other day with heme onc--patient completed course on 5/2 with improvement in LE strength   - started 60mg PO prednisone daily on 4/24 - decrease by 10mg every week--currently 50mg this week of 5/1   - failed TOV on 4/25, will continue nation, will do TOV at acute rehab  - to be discharged to acute rehab once treatment is complete--pending auth

## 2022-05-04 NOTE — PROGRESS NOTE ADULT - PROBLEM SELECTOR PLAN 3
Patient initially to [facility] with urosepsis c/b urinary retention, likely 2/2 neurological process. Nation placed at [facility].   -continue with flomax 0.4 QHS   -neuro workup as above  - patient nation'ed on 4/22 due to retention   - failed TOV on 4/25, will continue nation, will do TOV at acute rehab      #Fecal retention  Passing gas, incontinent at times, difficulty controlling BMs  - lactulose 5g daily   - miralax 17g daily   - senna 1 tablet qhs

## 2022-05-04 NOTE — PROGRESS NOTE ADULT - PROBLEM SELECTOR PLAN 2
CT AP 2.3 x 2.4 x 3.1 at pancreatic head, as per records, workup initiated at [facility]; however, unsure of results.  - Ca-19-9 and CEA negative at [facility]   - MR abdomen/pelvis w cont - pancreatic mass indeterminate etiology    - f/u GI recs  - per GI, EGD/EUS procedure to be performed after plasmapheresis and acute neurologic issues resolved. Will defer to outpatient as elective procedure.

## 2022-05-04 NOTE — PROGRESS NOTE ADULT - SUBJECTIVE AND OBJECTIVE BOX
*INCOMPLETE* OVERNIGHT EVENTS: NAOE    SUBJECTIVE / INTERVAL HPI: Patient seen and examined at bedside. Denies f/c, n/v, HA, chest pain, SOB, abdominal pain, diarrhea, constipation, melena, hematochezia, hematuria, dysuria. Pt reports improvement in his abdominal bloating.     MEDICATIONS  (STANDING):  cyanocobalamin 1000 MICROGram(s) Oral daily  enoxaparin Injectable 40 milliGRAM(s) SubCutaneous every 24 hours  gabapentin 100 milliGRAM(s) Oral three times a day  heparin  Lock Flush 100 Units/mL Injectable 600 Unit(s) IV Push once  lactulose Syrup 5 Gram(s) Oral daily  melatonin 5 milliGRAM(s) Oral at bedtime  pantoprazole    Tablet 40 milliGRAM(s) Oral two times a day  polyethylene glycol 3350 17 Gram(s) Oral daily  predniSONE   Tablet 50 milliGRAM(s) Oral every 24 hours  senna 1 Tablet(s) Oral at bedtime  simethicone 80 milliGRAM(s) Chew two times a day  tamsulosin 0.4 milliGRAM(s) Oral at bedtime    MEDICATIONS  (PRN):  acetaminophen     Tablet .. 650 milliGRAM(s) Oral every 6 hours PRN Temp greater or equal to 38C (100.4F), Mild Pain (1 - 3)  sodium chloride 0.9% lock flush 10 milliLiter(s) IV Push every 1 hour PRN Pre/post blood products, medications, blood draw, and to maintain line patency    Allergies    No Known Allergies    Intolerances        VITAL SIGNS:  Vital Signs Last 24 Hrs  T(C): 36.3 (04 May 2022 12:15), Max: 36.8 (03 May 2022 22:14)  T(F): 97.4 (04 May 2022 12:15), Max: 98.3 (03 May 2022 22:14)  HR: 79 (04 May 2022 12:15) (66 - 79)  BP: 129/64 (04 May 2022 12:15) (103/61 - 129/64)  BP(mean): --  RR: 18 (04 May 2022 12:15) (18 - 18)  SpO2: 97% (04 May 2022 12:15) (96% - 97%)      05-03-22 @ 07:01  -  05-04-22 @ 07:00  --------------------------------------------------------  IN: 0 mL / OUT: 1650 mL / NET: -1650 mL        PHYSICAL EXAM:  General: NAD, Laying comfortably in bed  HEENT: NC/AT, anicteric sclera, MMM  Neck: supple  Cardiovascular: +S1/S2, RRR, No murmurs, rubs, gallops  Respiratory: CTA B/L, no W/R/R  Gastrointestinal: soft, distended, NT, +BSx4  Extremities: WWP, no edema, clubbing or cyanosis  Vascular: 2+ radial, DP/PT pulses B/L  Neurological: AAOx3, no focal deficits, strength 3/5 in LE, strength 5/5 in UE; diminished sensation in LE up to belly button       LABS:                        12.1   11.14 )-----------( 218      ( 04 May 2022 09:38 )             38.8     05-04    137  |  100  |  21  ----------------------------<  108<H>  4.2   |  27  |  1.05    Ca    9.2      04 May 2022 09:38  Phos  4.2     05-04  Mg     2.2     05-04    TPro  5.9<L>  /  Alb  4.8  /  TBili  0.8  /  DBili  x   /  AST  34  /  ALT  51<H>  /  AlkPhos  37<L>  05-04    PT/INR - ( 04 May 2022 09:38 )   PT: 11.4 sec;   INR: 0.96          PTT - ( 04 May 2022 09:38 )  PTT:26.2 sec    CAPILLARY BLOOD GLUCOSE              RADIOLOGY & ADDITIONAL TESTS: Reviewed.

## 2022-05-04 NOTE — PROGRESS NOTE ADULT - PROBLEM SELECTOR PLAN 4
F-none    E-replete PRN    N-regular diet      DVT-lovenox 40 qd  GI-PPI 40 qd  Code: FULL  Dispo: pending placement to acute rehab

## 2022-05-04 NOTE — PROGRESS NOTE ADULT - ATTENDING COMMENTS
Pending rehab placement, possible discharge today.  Will confirm follow up with Dr. Bolton and FRANKY.

## 2022-05-05 ENCOUNTER — NON-APPOINTMENT (OUTPATIENT)
Age: 72
End: 2022-05-05

## 2022-05-05 LAB
A-TOCOPHEROL VIT E SERPL-MCNC: 8.7 MG/L — LOW (ref 9–29)
ALBUMIN SERPL ELPH-MCNC: 4.5 G/DL — SIGNIFICANT CHANGE UP (ref 3.3–5)
ALP SERPL-CCNC: 50 U/L — SIGNIFICANT CHANGE UP (ref 40–120)
ALT FLD-CCNC: 76 U/L — HIGH (ref 10–45)
ANION GAP SERPL CALC-SCNC: 8 MMOL/L — SIGNIFICANT CHANGE UP (ref 5–17)
AST SERPL-CCNC: 49 U/L — HIGH (ref 10–40)
BASOPHILS # BLD AUTO: 0.02 K/UL — SIGNIFICANT CHANGE UP (ref 0–0.2)
BASOPHILS NFR BLD AUTO: 0.2 % — SIGNIFICANT CHANGE UP (ref 0–2)
BETA+GAMMA TOCOPHEROL SERPL-MCNC: 0.7 MG/L — SIGNIFICANT CHANGE UP (ref 0.5–4.9)
BILIRUB SERPL-MCNC: 0.7 MG/DL — SIGNIFICANT CHANGE UP (ref 0.2–1.2)
BUN SERPL-MCNC: 23 MG/DL — SIGNIFICANT CHANGE UP (ref 7–23)
CALCIUM SERPL-MCNC: 8.7 MG/DL — SIGNIFICANT CHANGE UP (ref 8.4–10.5)
CHLORIDE SERPL-SCNC: 103 MMOL/L — SIGNIFICANT CHANGE UP (ref 96–108)
CO2 SERPL-SCNC: 26 MMOL/L — SIGNIFICANT CHANGE UP (ref 22–31)
CREAT SERPL-MCNC: 1.1 MG/DL — SIGNIFICANT CHANGE UP (ref 0.5–1.3)
EGFR: 72 ML/MIN/1.73M2 — SIGNIFICANT CHANGE UP
EOSINOPHIL # BLD AUTO: 0.02 K/UL — SIGNIFICANT CHANGE UP (ref 0–0.5)
EOSINOPHIL NFR BLD AUTO: 0.2 % — SIGNIFICANT CHANGE UP (ref 0–6)
GLUCOSE SERPL-MCNC: 108 MG/DL — HIGH (ref 70–99)
HCT VFR BLD CALC: 35.3 % — LOW (ref 39–50)
HGB BLD-MCNC: 11.5 G/DL — LOW (ref 13–17)
IMM GRANULOCYTES NFR BLD AUTO: 1.6 % — HIGH (ref 0–1.5)
LYMPHOCYTES # BLD AUTO: 1.55 K/UL — SIGNIFICANT CHANGE UP (ref 1–3.3)
LYMPHOCYTES # BLD AUTO: 16.1 % — SIGNIFICANT CHANGE UP (ref 13–44)
MAGNESIUM SERPL-MCNC: 2.1 MG/DL — SIGNIFICANT CHANGE UP (ref 1.6–2.6)
MCHC RBC-ENTMCNC: 29.7 PG — SIGNIFICANT CHANGE UP (ref 27–34)
MCHC RBC-ENTMCNC: 32.6 GM/DL — SIGNIFICANT CHANGE UP (ref 32–36)
MCV RBC AUTO: 91.2 FL — SIGNIFICANT CHANGE UP (ref 80–100)
MONOCYTES # BLD AUTO: 0.53 K/UL — SIGNIFICANT CHANGE UP (ref 0–0.9)
MONOCYTES NFR BLD AUTO: 5.5 % — SIGNIFICANT CHANGE UP (ref 2–14)
NEUTROPHILS # BLD AUTO: 7.35 K/UL — SIGNIFICANT CHANGE UP (ref 1.8–7.4)
NEUTROPHILS NFR BLD AUTO: 76.4 % — SIGNIFICANT CHANGE UP (ref 43–77)
NRBC # BLD: 0 /100 WBCS — SIGNIFICANT CHANGE UP (ref 0–0)
PHOSPHATE SERPL-MCNC: 4.2 MG/DL — SIGNIFICANT CHANGE UP (ref 2.5–4.5)
PLATELET # BLD AUTO: 210 K/UL — SIGNIFICANT CHANGE UP (ref 150–400)
POTASSIUM SERPL-MCNC: 4.2 MMOL/L — SIGNIFICANT CHANGE UP (ref 3.5–5.3)
POTASSIUM SERPL-SCNC: 4.2 MMOL/L — SIGNIFICANT CHANGE UP (ref 3.5–5.3)
PROT SERPL-MCNC: 5.5 G/DL — LOW (ref 6–8.3)
RBC # BLD: 3.87 M/UL — LOW (ref 4.2–5.8)
RBC # FLD: 15 % — HIGH (ref 10.3–14.5)
SODIUM SERPL-SCNC: 137 MMOL/L — SIGNIFICANT CHANGE UP (ref 135–145)
WBC # BLD: 9.62 K/UL — SIGNIFICANT CHANGE UP (ref 3.8–10.5)
WBC # FLD AUTO: 9.62 K/UL — SIGNIFICANT CHANGE UP (ref 3.8–10.5)

## 2022-05-05 PROCEDURE — 99231 SBSQ HOSP IP/OBS SF/LOW 25: CPT

## 2022-05-05 RX ADMIN — SIMETHICONE 80 MILLIGRAM(S): 80 TABLET, CHEWABLE ORAL at 06:18

## 2022-05-05 RX ADMIN — Medication 5 MILLIGRAM(S): at 21:26

## 2022-05-05 RX ADMIN — GABAPENTIN 100 MILLIGRAM(S): 400 CAPSULE ORAL at 13:26

## 2022-05-05 RX ADMIN — ENOXAPARIN SODIUM 40 MILLIGRAM(S): 100 INJECTION SUBCUTANEOUS at 21:26

## 2022-05-05 RX ADMIN — PANTOPRAZOLE SODIUM 40 MILLIGRAM(S): 20 TABLET, DELAYED RELEASE ORAL at 06:18

## 2022-05-05 RX ADMIN — TAMSULOSIN HYDROCHLORIDE 0.4 MILLIGRAM(S): 0.4 CAPSULE ORAL at 21:27

## 2022-05-05 RX ADMIN — GABAPENTIN 100 MILLIGRAM(S): 400 CAPSULE ORAL at 06:17

## 2022-05-05 RX ADMIN — PANTOPRAZOLE SODIUM 40 MILLIGRAM(S): 20 TABLET, DELAYED RELEASE ORAL at 18:33

## 2022-05-05 RX ADMIN — LACTULOSE 5 GRAM(S): 10 SOLUTION ORAL at 13:25

## 2022-05-05 RX ADMIN — PREGABALIN 1000 MICROGRAM(S): 225 CAPSULE ORAL at 13:26

## 2022-05-05 RX ADMIN — SIMETHICONE 80 MILLIGRAM(S): 80 TABLET, CHEWABLE ORAL at 18:34

## 2022-05-05 RX ADMIN — Medication 50 MILLIGRAM(S): at 06:18

## 2022-05-05 RX ADMIN — SENNA PLUS 1 TABLET(S): 8.6 TABLET ORAL at 21:27

## 2022-05-05 RX ADMIN — GABAPENTIN 100 MILLIGRAM(S): 400 CAPSULE ORAL at 21:26

## 2022-05-05 NOTE — PROGRESS NOTE ADULT - PROBLEM SELECTOR PLAN 1
Hx diarrhea two weeks prior to acute LE weakness, imaging consistent with transverse myelitis. Elevated inflammatory markers. Lumbar tap done at [facility]-for which studies pending. MR head and CSpine w/wout - faint T2 hyperintensity in posterior lower cervical cord without definite enhancement. NMO and anti-MOG antibodies negative  - s/p x3d high dose steroids with minimal improvement in symptoms.   - s/p IVIG 0.65g/kg x3 days with minimal improvement in symptoms.  - Autoimmune myelopathy panel sent 4/20 6pm  - HD cath placed 4/19 for PLEX starting 4/20 x7 sessions every other day with heme onc--patient completed course on 5/2 with improvement in LE strength   - started 60mg PO prednisone daily on 4/24 - decrease by 10mg every week--currently 50mg this week of 5/1   - failed TOV on 4/25, will continue nation, will do TOV at acute rehab  - to be discharged to acute rehab once treatment is complete--pending auth Hx diarrhea two weeks prior to acute LE weakness, imaging consistent with transverse myelitis. Elevated inflammatory markers. Lumbar tap done at [facility]-for which studies pending. MR head and CSpine w/wout - faint T2 hyperintensity in posterior lower cervical cord without definite enhancement. NMO and anti-MOG antibodies negative  - s/p x3d high dose steroids with minimal improvement in symptoms.   - s/p IVIG 0.65g/kg x3 days with minimal improvement in symptoms.  - Autoimmune myelopathy panel sent 4/20 6pm  - HD cath placed 4/19 for PLEX starting 4/20 x7 sessions every other day with heme onc--patient completed course on 5/2 with dramatic improvement in LE strength   - started 60mg PO prednisone daily on 4/24 - decrease by 10mg every week--currently 50mg this week of 5/1   - failed TOV on 4/25, will continue nation, will do TOV at acute rehab  - to be discharged to acute rehab once treatment is complete--pending auth

## 2022-05-05 NOTE — PROGRESS NOTE ADULT - ASSESSMENT
71 M with no past medical history, not on any medications, transferred here from [facility] for bilateral lower extremity weakness 2/2 Transverse Myelitis of unknown etiology, now on steroids and plasmapharesis, currently with improving strength but no improvement in sensation

## 2022-05-05 NOTE — PROGRESS NOTE ADULT - ATTENDING COMMENTS
CIDP s/p steroids, IVIG, and PLEX, initially paraplegic, now with dramatic improvement in lower extremity strength following PLEX treatment and physical therapy.  He doing very well with physical therapy and is an excellent candidate for acute rehab.  He is currently awaiting placement.  Will follow up with Dr. Bolton. Transverse myelitis s/p steroids, IVIG, and PLEX, initially paraplegic, now with dramatic improvement in lower extremity strength following PLEX treatment and physical therapy.  He doing very well with physical therapy and is an excellent candidate for acute rehab.  He is currently awaiting placement.  Will follow up with Dr. Bolton.

## 2022-05-05 NOTE — PROGRESS NOTE ADULT - PROBLEM SELECTOR PLAN 3
Patient initially to [facility] with urosepsis c/b urinary retention, likely 2/2 neurological process. Nation placed at [facility].   -continue with flomax 0.4 QHS   -neuro workup as above  - patient nation'ed on 4/22 due to retention   - failed TOV on 4/25, will continue nation, will do TOV at acute rehab      #Fecal retention  Passing gas, incontinent at times, difficulty controlling BMs  - lactulose 5g daily   - miralax 17g daily   - senna 1 tablet qhs

## 2022-05-05 NOTE — PROGRESS NOTE ADULT - SUBJECTIVE AND OBJECTIVE BOX
**Incomplete Note**   OVERNIGHT EVENTS: NAOE    SUBJECTIVE / INTERVAL HPI: Patient seen and examined at bedside. Denies f/c, n/v, HA, chest pain, SOB, abdominal pain, diarrhea, constipation, melena, hematochezia, hematuria, dysuria    MEDICATIONS  (STANDING):  cyanocobalamin 1000 MICROGram(s) Oral daily  enoxaparin Injectable 40 milliGRAM(s) SubCutaneous every 24 hours  gabapentin 100 milliGRAM(s) Oral three times a day  heparin  Lock Flush 100 Units/mL Injectable 600 Unit(s) IV Push once  lactulose Syrup 5 Gram(s) Oral daily  melatonin 5 milliGRAM(s) Oral at bedtime  pantoprazole    Tablet 40 milliGRAM(s) Oral two times a day  polyethylene glycol 3350 17 Gram(s) Oral daily  predniSONE   Tablet 50 milliGRAM(s) Oral every 24 hours  senna 1 Tablet(s) Oral at bedtime  simethicone 80 milliGRAM(s) Chew two times a day  tamsulosin 0.4 milliGRAM(s) Oral at bedtime    MEDICATIONS  (PRN):  acetaminophen     Tablet .. 650 milliGRAM(s) Oral every 6 hours PRN Temp greater or equal to 38C (100.4F), Mild Pain (1 - 3)  sodium chloride 0.9% lock flush 10 milliLiter(s) IV Push every 1 hour PRN Pre/post blood products, medications, blood draw, and to maintain line patency    Allergies    No Known Allergies    Intolerances        VITAL SIGNS:  Vital Signs Last 24 Hrs  T(C): 36.6 (05 May 2022 12:54), Max: 37.1 (04 May 2022 20:55)  T(F): 97.9 (05 May 2022 12:54), Max: 98.7 (04 May 2022 20:55)  HR: 70 (05 May 2022 12:54) (70 - 91)  BP: 112/68 (05 May 2022 12:54) (105/66 - 120/76)  BP(mean): --  RR: 18 (05 May 2022 12:54) (18 - 20)  SpO2: 96% (05 May 2022 12:54) (95% - 96%)        PHYSICAL EXAM:  General: NAD, Laying comfortably in bed  HEENT: NC/AT, anicteric sclera, MMM  Neck: supple  Cardiovascular: +S1/S2, RRR, No murmurs, rubs, gallops  Respiratory: CTA B/L, no W/R/R  Gastrointestinal: soft, distended, NT, +BSx4  Extremities: WWP, no edema, clubbing or cyanosis  Vascular: 2+ radial, DP/PT pulses B/L  Neurological: AAOx3, no focal deficits, strength 3/5 in LE, strength 5/5 in UE; diminished sensation in LE up to belly button       LABS:                        11.5   9.62  )-----------( 210      ( 05 May 2022 08:41 )             35.3     05-05    137  |  103  |  23  ----------------------------<  108<H>  4.2   |  26  |  1.10    Ca    8.7      05 May 2022 08:41  Phos  4.2     05-05  Mg     2.1     05-05    TPro  5.5<L>  /  Alb  4.5  /  TBili  0.7  /  DBili  x   /  AST  49<H>  /  ALT  76<H>  /  AlkPhos  50  05-05    PT/INR - ( 04 May 2022 09:38 )   PT: 11.4 sec;   INR: 0.96          PTT - ( 04 May 2022 09:38 )  PTT:26.2 sec    CAPILLARY BLOOD GLUCOSE              RADIOLOGY & ADDITIONAL TESTS: Reviewed.

## 2022-05-05 NOTE — PROGRESS NOTE ADULT - PROBLEM SELECTOR PLAN 2
CT AP 2.3 x 2.4 x 3.1 at pancreatic head, as per records, workup initiated at [facility]; however, unsure of results.  - Ca-19-9 and CEA negative at [facility]   - MR abdomen/pelvis w cont - pancreatic mass indeterminate etiology    - f/u GI recs  - per GI, EGD/EUS procedure to be performed after plasmapheresis and acute neurologic issues resolved. Will defer to outpatient as elective procedure.

## 2022-05-05 NOTE — PROGRESS NOTE ADULT - PROBLEM SELECTOR PLAN 4
F-none    E-replete PRN    N-regular diet      DVT-lovenox 40 qd  GI-PPI 40 qd  Code: FULL  Dispo: pending placement to acute rehab F-none    E-replete PRN    N-regular diet      DVT-lovenox 40 qd  GI-PPI 40 qd  Code: FULL  Dispo: pending placement

## 2022-05-06 LAB
ALBUMIN SERPL ELPH-MCNC: 4.1 G/DL — SIGNIFICANT CHANGE UP (ref 3.3–5)
ALP SERPL-CCNC: 45 U/L — SIGNIFICANT CHANGE UP (ref 40–120)
ALT FLD-CCNC: 67 U/L — HIGH (ref 10–45)
ANION GAP SERPL CALC-SCNC: 11 MMOL/L — SIGNIFICANT CHANGE UP (ref 5–17)
AST SERPL-CCNC: 34 U/L — SIGNIFICANT CHANGE UP (ref 10–40)
BASOPHILS # BLD AUTO: 0.02 K/UL — SIGNIFICANT CHANGE UP (ref 0–0.2)
BASOPHILS NFR BLD AUTO: 0.2 % — SIGNIFICANT CHANGE UP (ref 0–2)
BILIRUB SERPL-MCNC: 0.6 MG/DL — SIGNIFICANT CHANGE UP (ref 0.2–1.2)
BUN SERPL-MCNC: 21 MG/DL — SIGNIFICANT CHANGE UP (ref 7–23)
CALCIUM SERPL-MCNC: 8.8 MG/DL — SIGNIFICANT CHANGE UP (ref 8.4–10.5)
CHLORIDE SERPL-SCNC: 102 MMOL/L — SIGNIFICANT CHANGE UP (ref 96–108)
CO2 SERPL-SCNC: 27 MMOL/L — SIGNIFICANT CHANGE UP (ref 22–31)
CREAT SERPL-MCNC: 1.17 MG/DL — SIGNIFICANT CHANGE UP (ref 0.5–1.3)
EGFR: 67 ML/MIN/1.73M2 — SIGNIFICANT CHANGE UP
EOSINOPHIL # BLD AUTO: 0.08 K/UL — SIGNIFICANT CHANGE UP (ref 0–0.5)
EOSINOPHIL NFR BLD AUTO: 0.9 % — SIGNIFICANT CHANGE UP (ref 0–6)
GLUCOSE SERPL-MCNC: 99 MG/DL — SIGNIFICANT CHANGE UP (ref 70–99)
HCT VFR BLD CALC: 35.5 % — LOW (ref 39–50)
HGB BLD-MCNC: 11.2 G/DL — LOW (ref 13–17)
IMM GRANULOCYTES NFR BLD AUTO: 1.7 % — HIGH (ref 0–1.5)
LYMPHOCYTES # BLD AUTO: 2.7 K/UL — SIGNIFICANT CHANGE UP (ref 1–3.3)
LYMPHOCYTES # BLD AUTO: 28.8 % — SIGNIFICANT CHANGE UP (ref 13–44)
MAGNESIUM SERPL-MCNC: 2.2 MG/DL — SIGNIFICANT CHANGE UP (ref 1.6–2.6)
MCHC RBC-ENTMCNC: 29.1 PG — SIGNIFICANT CHANGE UP (ref 27–34)
MCHC RBC-ENTMCNC: 31.5 GM/DL — LOW (ref 32–36)
MCV RBC AUTO: 92.2 FL — SIGNIFICANT CHANGE UP (ref 80–100)
MONOCYTES # BLD AUTO: 0.52 K/UL — SIGNIFICANT CHANGE UP (ref 0–0.9)
MONOCYTES NFR BLD AUTO: 5.6 % — SIGNIFICANT CHANGE UP (ref 2–14)
NEUTROPHILS # BLD AUTO: 5.88 K/UL — SIGNIFICANT CHANGE UP (ref 1.8–7.4)
NEUTROPHILS NFR BLD AUTO: 62.8 % — SIGNIFICANT CHANGE UP (ref 43–77)
NRBC # BLD: 0 /100 WBCS — SIGNIFICANT CHANGE UP (ref 0–0)
PHOSPHATE SERPL-MCNC: 4 MG/DL — SIGNIFICANT CHANGE UP (ref 2.5–4.5)
PLATELET # BLD AUTO: 201 K/UL — SIGNIFICANT CHANGE UP (ref 150–400)
POTASSIUM SERPL-MCNC: 4.1 MMOL/L — SIGNIFICANT CHANGE UP (ref 3.5–5.3)
POTASSIUM SERPL-SCNC: 4.1 MMOL/L — SIGNIFICANT CHANGE UP (ref 3.5–5.3)
PROT SERPL-MCNC: 5.3 G/DL — LOW (ref 6–8.3)
RBC # BLD: 3.85 M/UL — LOW (ref 4.2–5.8)
RBC # FLD: 14.9 % — HIGH (ref 10.3–14.5)
SODIUM SERPL-SCNC: 140 MMOL/L — SIGNIFICANT CHANGE UP (ref 135–145)
WBC # BLD: 9.36 K/UL — SIGNIFICANT CHANGE UP (ref 3.8–10.5)
WBC # FLD AUTO: 9.36 K/UL — SIGNIFICANT CHANGE UP (ref 3.8–10.5)

## 2022-05-06 PROCEDURE — 99231 SBSQ HOSP IP/OBS SF/LOW 25: CPT

## 2022-05-06 RX ADMIN — GABAPENTIN 100 MILLIGRAM(S): 400 CAPSULE ORAL at 22:42

## 2022-05-06 RX ADMIN — Medication 50 MILLIGRAM(S): at 06:19

## 2022-05-06 RX ADMIN — PANTOPRAZOLE SODIUM 40 MILLIGRAM(S): 20 TABLET, DELAYED RELEASE ORAL at 18:03

## 2022-05-06 RX ADMIN — ENOXAPARIN SODIUM 40 MILLIGRAM(S): 100 INJECTION SUBCUTANEOUS at 22:42

## 2022-05-06 RX ADMIN — GABAPENTIN 100 MILLIGRAM(S): 400 CAPSULE ORAL at 06:19

## 2022-05-06 RX ADMIN — SIMETHICONE 80 MILLIGRAM(S): 80 TABLET, CHEWABLE ORAL at 06:18

## 2022-05-06 RX ADMIN — PREGABALIN 1000 MICROGRAM(S): 225 CAPSULE ORAL at 13:38

## 2022-05-06 RX ADMIN — SENNA PLUS 1 TABLET(S): 8.6 TABLET ORAL at 22:42

## 2022-05-06 RX ADMIN — LACTULOSE 5 GRAM(S): 10 SOLUTION ORAL at 13:39

## 2022-05-06 RX ADMIN — SIMETHICONE 80 MILLIGRAM(S): 80 TABLET, CHEWABLE ORAL at 18:03

## 2022-05-06 RX ADMIN — PANTOPRAZOLE SODIUM 40 MILLIGRAM(S): 20 TABLET, DELAYED RELEASE ORAL at 06:19

## 2022-05-06 RX ADMIN — Medication 5 MILLIGRAM(S): at 22:42

## 2022-05-06 RX ADMIN — TAMSULOSIN HYDROCHLORIDE 0.4 MILLIGRAM(S): 0.4 CAPSULE ORAL at 22:41

## 2022-05-06 RX ADMIN — GABAPENTIN 100 MILLIGRAM(S): 400 CAPSULE ORAL at 13:38

## 2022-05-06 NOTE — PROGRESS NOTE ADULT - SUBJECTIVE AND OBJECTIVE BOX
**Incomplete Note**   OVERNIGHT EVENTS: NAOE    SUBJECTIVE / INTERVAL HPI: Patient seen and examined at bedside. Denies f/c, n/v, HA, chest pain, SOB, abdominal pain, diarrhea, constipation, melena, hematochezia, hematuria, dysuria. Pt reports improvement in his abdominal bloating as well as improvement in his LE strength.     MEDICATIONS  (STANDING):  cyanocobalamin 1000 MICROGram(s) Oral daily  enoxaparin Injectable 40 milliGRAM(s) SubCutaneous every 24 hours  gabapentin 100 milliGRAM(s) Oral three times a day  heparin  Lock Flush 100 Units/mL Injectable 600 Unit(s) IV Push once  lactulose Syrup 5 Gram(s) Oral daily  melatonin 5 milliGRAM(s) Oral at bedtime  pantoprazole    Tablet 40 milliGRAM(s) Oral two times a day  polyethylene glycol 3350 17 Gram(s) Oral daily  predniSONE   Tablet 50 milliGRAM(s) Oral every 24 hours  senna 1 Tablet(s) Oral at bedtime  simethicone 80 milliGRAM(s) Chew two times a day  tamsulosin 0.4 milliGRAM(s) Oral at bedtime    MEDICATIONS  (PRN):  acetaminophen     Tablet .. 650 milliGRAM(s) Oral every 6 hours PRN Temp greater or equal to 38C (100.4F), Mild Pain (1 - 3)  sodium chloride 0.9% lock flush 10 milliLiter(s) IV Push every 1 hour PRN Pre/post blood products, medications, blood draw, and to maintain line patency    Allergies    No Known Allergies    Intolerances        VITAL SIGNS:  Vital Signs Last 24 Hrs  T(C): 36.6 (06 May 2022 06:20), Max: 36.7 (05 May 2022 20:51)  T(F): 97.8 (06 May 2022 06:20), Max: 98 (05 May 2022 20:51)  HR: 66 (06 May 2022 06:20) (66 - 71)  BP: 123/69 (06 May 2022 06:20) (112/68 - 132/68)  BP(mean): --  RR: 18 (06 May 2022 06:20) (18 - 19)  SpO2: 96% (06 May 2022 06:20) (96% - 97%)        PHYSICAL EXAM:  General: NAD, Laying comfortably in bed  HEENT: NC/AT, anicteric sclera, MMM  Neck: supple  Cardiovascular: +S1/S2, RRR, No murmurs, rubs, gallops  Respiratory: CTA B/L, no W/R/R  Gastrointestinal: soft, distended, NT, +BSx4  Extremities: WWP, no edema, clubbing or cyanosis  Vascular: 2+ radial, DP/PT pulses B/L  Neurological: AAOx3, no focal deficits, strength 3/5 in LE, strength 5/5 in UE; diminished sensation in LE up to belly button       LABS:                        11.2   9.36  )-----------( 201      ( 06 May 2022 07:48 )             35.5     05-06    140  |  102  |  21  ----------------------------<  99  4.1   |  27  |  1.17    Ca    8.8      06 May 2022 07:48  Phos  4.0     05-06  Mg     2.2     05-06    TPro  5.3<L>  /  Alb  4.1  /  TBili  0.6  /  DBili  x   /  AST  34  /  ALT  67<H>  /  AlkPhos  45  05-06        CAPILLARY BLOOD GLUCOSE              RADIOLOGY & ADDITIONAL TESTS: Reviewed.

## 2022-05-06 NOTE — PROGRESS NOTE ADULT - ATTENDING COMMENTS
On exam today hip flexion and dorsiflexion are nearly full strength (4+/5), while knee flexion and extension remain more impaired (4/5).  He is very eager to be transferred to rehab.  Was originally scheduled to be transferred to [place] today, but now planned for Monday at the earliest pending updated clinical notes.  He is doing very well with physical therapy and is an excellent candidate for acute rehab.

## 2022-05-06 NOTE — PROGRESS NOTE ADULT - PROBLEM SELECTOR PLAN 4
F-none    E-replete PRN    N-regular diet      DVT-lovenox 40 qd  GI-PPI 40 qd  Code: FULL  Dispo: pending placement

## 2022-05-06 NOTE — PROGRESS NOTE ADULT - PROBLEM SELECTOR PLAN 2
CT AP 2.3 x 2.4 x 3.1 at pancreatic head, as per records, workup initiated at [facility]; however, unsure of results.  - Ca-19-9 and CEA negative at [facility]   - MR abdomen/pelvis w cont - pancreatic mass indeterminate etiology    - f/u GI recs  - per GI, EGD/EUS procedure to be performed after plasmapheresis and acute neurologic issues resolved. Will defer to outpatient as elective procedure.

## 2022-05-06 NOTE — PROGRESS NOTE ADULT - ASSESSMENT
71 M with no past medical history, not on any medications, transferred here from [facility] for bilateral lower extremity weakness 2/2 Transverse Myelitis of unknown etiology, now on steroids and plasmapharesis, currently with improving strength but no improvement in sensation

## 2022-05-06 NOTE — PROGRESS NOTE ADULT - PROBLEM SELECTOR PLAN 1
Hx diarrhea two weeks prior to acute LE weakness, imaging consistent with transverse myelitis. Elevated inflammatory markers. Lumbar tap done at [facility]-for which studies pending. MR head and CSpine w/wout - faint T2 hyperintensity in posterior lower cervical cord without definite enhancement. NMO and anti-MOG antibodies negative  - s/p x3d high dose steroids with minimal improvement in symptoms.   - s/p IVIG 0.65g/kg x3 days with minimal improvement in symptoms.  - Autoimmune myelopathy panel sent 4/20 6pm  - HD cath placed 4/19 for PLEX starting 4/20 x7 sessions every other day with heme onc--patient completed course on 5/2 with dramatic improvement in LE strength   - started 60mg PO prednisone daily on 4/24 - decrease by 10mg every week--currently 50mg this week of 5/1   - failed TOV on 4/25, will continue nation, will do TOV at acute rehab  - to be discharged to acute rehab once treatment is complete--pending auth

## 2022-05-06 NOTE — PROGRESS NOTE ADULT - PROBLEM SELECTOR PLAN 3
Patient initially to [facility] with urosepsis c/b urinary retention, likely 2/2 neurological process. Nation placed at [facility].   -continue with flomax 0.4 QHS   -neuro workup as above  - patient nation'ed on 4/22 due to retention   - failed TOV on 4/25, will continue nation, will do TOV at acute rehab      #Fecal retention  Passing gas, incontinent at times, difficulty controlling BMs  - lactulose 5g daily   - miralax 17g daily   - senna 1 tablet qhs

## 2022-05-07 LAB
ALBUMIN SERPL ELPH-MCNC: 4.2 G/DL — SIGNIFICANT CHANGE UP (ref 3.3–5)
ALP SERPL-CCNC: 49 U/L — SIGNIFICANT CHANGE UP (ref 40–120)
ALT FLD-CCNC: 60 U/L — HIGH (ref 10–45)
ANION GAP SERPL CALC-SCNC: 11 MMOL/L — SIGNIFICANT CHANGE UP (ref 5–17)
AST SERPL-CCNC: 25 U/L — SIGNIFICANT CHANGE UP (ref 10–40)
BASOPHILS # BLD AUTO: 0.02 K/UL — SIGNIFICANT CHANGE UP (ref 0–0.2)
BASOPHILS NFR BLD AUTO: 0.2 % — SIGNIFICANT CHANGE UP (ref 0–2)
BILIRUB SERPL-MCNC: 0.6 MG/DL — SIGNIFICANT CHANGE UP (ref 0.2–1.2)
BUN SERPL-MCNC: 21 MG/DL — SIGNIFICANT CHANGE UP (ref 7–23)
CALCIUM SERPL-MCNC: 9 MG/DL — SIGNIFICANT CHANGE UP (ref 8.4–10.5)
CHLORIDE SERPL-SCNC: 102 MMOL/L — SIGNIFICANT CHANGE UP (ref 96–108)
CO2 SERPL-SCNC: 28 MMOL/L — SIGNIFICANT CHANGE UP (ref 22–31)
CREAT SERPL-MCNC: 1.18 MG/DL — SIGNIFICANT CHANGE UP (ref 0.5–1.3)
EGFR: 66 ML/MIN/1.73M2 — SIGNIFICANT CHANGE UP
EOSINOPHIL # BLD AUTO: 0.09 K/UL — SIGNIFICANT CHANGE UP (ref 0–0.5)
EOSINOPHIL NFR BLD AUTO: 1 % — SIGNIFICANT CHANGE UP (ref 0–6)
GLUCOSE SERPL-MCNC: 94 MG/DL — SIGNIFICANT CHANGE UP (ref 70–99)
HCT VFR BLD CALC: 35.8 % — LOW (ref 39–50)
HGB BLD-MCNC: 11.4 G/DL — LOW (ref 13–17)
IMM GRANULOCYTES NFR BLD AUTO: 1.3 % — SIGNIFICANT CHANGE UP (ref 0–1.5)
LYMPHOCYTES # BLD AUTO: 2.89 K/UL — SIGNIFICANT CHANGE UP (ref 1–3.3)
LYMPHOCYTES # BLD AUTO: 32.2 % — SIGNIFICANT CHANGE UP (ref 13–44)
MAGNESIUM SERPL-MCNC: 2.1 MG/DL — SIGNIFICANT CHANGE UP (ref 1.6–2.6)
MCHC RBC-ENTMCNC: 29.4 PG — SIGNIFICANT CHANGE UP (ref 27–34)
MCHC RBC-ENTMCNC: 31.8 GM/DL — LOW (ref 32–36)
MCV RBC AUTO: 92.3 FL — SIGNIFICANT CHANGE UP (ref 80–100)
MONOCYTES # BLD AUTO: 0.49 K/UL — SIGNIFICANT CHANGE UP (ref 0–0.9)
MONOCYTES NFR BLD AUTO: 5.5 % — SIGNIFICANT CHANGE UP (ref 2–14)
NEUTROPHILS # BLD AUTO: 5.37 K/UL — SIGNIFICANT CHANGE UP (ref 1.8–7.4)
NEUTROPHILS NFR BLD AUTO: 59.8 % — SIGNIFICANT CHANGE UP (ref 43–77)
NRBC # BLD: 0 /100 WBCS — SIGNIFICANT CHANGE UP (ref 0–0)
PHOSPHATE SERPL-MCNC: 4.4 MG/DL — SIGNIFICANT CHANGE UP (ref 2.5–4.5)
PLATELET # BLD AUTO: 206 K/UL — SIGNIFICANT CHANGE UP (ref 150–400)
POTASSIUM SERPL-MCNC: 4 MMOL/L — SIGNIFICANT CHANGE UP (ref 3.5–5.3)
POTASSIUM SERPL-SCNC: 4 MMOL/L — SIGNIFICANT CHANGE UP (ref 3.5–5.3)
PROT SERPL-MCNC: 5.5 G/DL — LOW (ref 6–8.3)
RBC # BLD: 3.88 M/UL — LOW (ref 4.2–5.8)
RBC # FLD: 14.7 % — HIGH (ref 10.3–14.5)
SODIUM SERPL-SCNC: 141 MMOL/L — SIGNIFICANT CHANGE UP (ref 135–145)
WBC # BLD: 8.98 K/UL — SIGNIFICANT CHANGE UP (ref 3.8–10.5)
WBC # FLD AUTO: 8.98 K/UL — SIGNIFICANT CHANGE UP (ref 3.8–10.5)

## 2022-05-07 PROCEDURE — 99231 SBSQ HOSP IP/OBS SF/LOW 25: CPT

## 2022-05-07 RX ORDER — SENNA PLUS 8.6 MG/1
2 TABLET ORAL ONCE
Refills: 0 | Status: COMPLETED | OUTPATIENT
Start: 2022-05-07 | End: 2022-05-07

## 2022-05-07 RX ORDER — POLYETHYLENE GLYCOL 3350 17 G/17G
17 POWDER, FOR SOLUTION ORAL ONCE
Refills: 0 | Status: COMPLETED | OUTPATIENT
Start: 2022-05-07 | End: 2022-05-07

## 2022-05-07 RX ADMIN — PANTOPRAZOLE SODIUM 40 MILLIGRAM(S): 20 TABLET, DELAYED RELEASE ORAL at 06:01

## 2022-05-07 RX ADMIN — SENNA PLUS 2 TABLET(S): 8.6 TABLET ORAL at 17:54

## 2022-05-07 RX ADMIN — PANTOPRAZOLE SODIUM 40 MILLIGRAM(S): 20 TABLET, DELAYED RELEASE ORAL at 17:54

## 2022-05-07 RX ADMIN — SIMETHICONE 80 MILLIGRAM(S): 80 TABLET, CHEWABLE ORAL at 06:01

## 2022-05-07 RX ADMIN — SENNA PLUS 1 TABLET(S): 8.6 TABLET ORAL at 22:16

## 2022-05-07 RX ADMIN — PREGABALIN 1000 MICROGRAM(S): 225 CAPSULE ORAL at 13:17

## 2022-05-07 RX ADMIN — GABAPENTIN 100 MILLIGRAM(S): 400 CAPSULE ORAL at 06:01

## 2022-05-07 RX ADMIN — POLYETHYLENE GLYCOL 3350 17 GRAM(S): 17 POWDER, FOR SOLUTION ORAL at 17:54

## 2022-05-07 RX ADMIN — Medication 5 MILLIGRAM(S): at 22:16

## 2022-05-07 RX ADMIN — LACTULOSE 5 GRAM(S): 10 SOLUTION ORAL at 13:17

## 2022-05-07 RX ADMIN — GABAPENTIN 100 MILLIGRAM(S): 400 CAPSULE ORAL at 13:17

## 2022-05-07 RX ADMIN — ENOXAPARIN SODIUM 40 MILLIGRAM(S): 100 INJECTION SUBCUTANEOUS at 22:16

## 2022-05-07 RX ADMIN — TAMSULOSIN HYDROCHLORIDE 0.4 MILLIGRAM(S): 0.4 CAPSULE ORAL at 22:16

## 2022-05-07 RX ADMIN — SIMETHICONE 80 MILLIGRAM(S): 80 TABLET, CHEWABLE ORAL at 17:54

## 2022-05-07 RX ADMIN — GABAPENTIN 100 MILLIGRAM(S): 400 CAPSULE ORAL at 22:16

## 2022-05-07 RX ADMIN — Medication 50 MILLIGRAM(S): at 06:01

## 2022-05-07 NOTE — PROGRESS NOTE ADULT - PROBLEM SELECTOR PLAN 1
Hx diarrhea two weeks prior to acute LE weakness, imaging consistent with transverse myelitis. Elevated inflammatory markers. Lumbar tap done at [facility]-for which studies pending. MR head and CSpine w/wout - faint T2 hyperintensity in posterior lower cervical cord without definite enhancement. NMO and anti-MOG antibodies negative  - s/p x3d high dose steroids with minimal improvement in symptoms.   - s/p IVIG 0.65g/kg x3 days with minimal improvement in symptoms.  - Autoimmune myelopathy panel sent 4/20 6pm  - HD cath placed 4/19 for PLEX starting 4/20 x7 sessions every other day with heme onc--patient completed course on 5/2 with dramatic improvement in LE strength   - started 60mg PO prednisone daily on 4/24 - decrease by 10mg every week--currently 50mg this week of 5/1   - failed TOV on 4/25, will continue nation, will do TOV at acute rehab  - to be discharged to acute rehab once treatment is complete--pending auth

## 2022-05-07 NOTE — PROGRESS NOTE ADULT - ATTENDING SUPERVISION STATEMENT
Fellow
Resident

## 2022-05-07 NOTE — PROGRESS NOTE ADULT - ASSESSMENT
70 yo M with no PMHx, presented with progressive ascending bilateral lower extremity weakness and numbness, admitted for transverse Myelitis, s/p plasmapharesis and now on steroid taper, currently regaining strength in LE, pending acute rehab

## 2022-05-07 NOTE — PROGRESS NOTE ADULT - PROBLEM SELECTOR PLAN 2
CT AP 2.3 x 2.4 x 3.1 at pancreatic head, as per records, workup initiated at [facility]; however, unsure of results.  - Ca-19-9 and CEA negative at [facility]   - MR abdomen/pelvis w cont - pancreatic mass indeterminate etiology    - f/u GI recs  - per GI, EGD/EUS procedure to be performed after plasmapheresis and acute neurologic issues resolved. Will defer to outpatient as elective procedure.

## 2022-05-07 NOTE — PROGRESS NOTE ADULT - PROBLEM SELECTOR PLAN 3
Patient initially to [facility] with urosepsis c/b urinary retention, likely 2/2 neurological process. Nation placed at [facility].   -continue with flomax 0.4 QHS   -neuro workup as above  - patient nation'ed on 4/22 due to retention   - failed TOV on 4/25, will continue nation, will do TOV at acute rehab      #Fecal retention  Passing gas, incontinent at times, difficulty controlling BMs  - lactulose 5g daily   - miralax 17g daily   - senna 1 tablet qhs

## 2022-05-07 NOTE — PROGRESS NOTE ADULT - SUBJECTIVE AND OBJECTIVE BOX
**Incomplete Note**   OVERNIGHT EVENTS: NAOE    SUBJECTIVE / INTERVAL HPI: Patient seen and examined at bedside. Pt reports improvement in his LE strength. Denies f/c, n/v, HA, chest pain, SOB, abdominal pain, diarrhea, constipation, melena, hematochezia, hematuria, dysuria    MEDICATIONS  (STANDING):  cyanocobalamin 1000 MICROGram(s) Oral daily  enoxaparin Injectable 40 milliGRAM(s) SubCutaneous every 24 hours  gabapentin 100 milliGRAM(s) Oral three times a day  heparin  Lock Flush 100 Units/mL Injectable 600 Unit(s) IV Push once  lactulose Syrup 5 Gram(s) Oral daily  melatonin 5 milliGRAM(s) Oral at bedtime  pantoprazole    Tablet 40 milliGRAM(s) Oral two times a day  polyethylene glycol 3350 17 Gram(s) Oral daily  predniSONE   Tablet 50 milliGRAM(s) Oral every 24 hours  senna 1 Tablet(s) Oral at bedtime  simethicone 80 milliGRAM(s) Chew two times a day  tamsulosin 0.4 milliGRAM(s) Oral at bedtime    MEDICATIONS  (PRN):  acetaminophen     Tablet .. 650 milliGRAM(s) Oral every 6 hours PRN Temp greater or equal to 38C (100.4F), Mild Pain (1 - 3)  sodium chloride 0.9% lock flush 10 milliLiter(s) IV Push every 1 hour PRN Pre/post blood products, medications, blood draw, and to maintain line patency    Allergies    No Known Allergies    Intolerances        VITAL SIGNS:  Vital Signs Last 24 Hrs  T(C): 36.6 (07 May 2022 06:12), Max: 37 (06 May 2022 22:29)  T(F): 97.9 (07 May 2022 06:12), Max: 98.6 (06 May 2022 22:29)  HR: 67 (07 May 2022 06:12) (67 - 70)  BP: 102/62 (07 May 2022 06:12) (102/62 - 132/73)  BP(mean): --  RR: 18 (07 May 2022 06:12) (18 - 18)  SpO2: 98% (07 May 2022 06:12) (96% - 98%)      05-06-22 @ 07:01  -  05-07-22 @ 07:00  --------------------------------------------------------  IN: 0 mL / OUT: 1700 mL / NET: -1700 mL        PHYSICAL EXAM:  General: NAD, Laying comfortably in bed  HEENT: NC/AT, anicteric sclera, MMM  Neck: supple  Cardiovascular: +S1/S2, RRR, No murmurs, rubs, gallops  Respiratory: CTA B/L, no W/R/R  Gastrointestinal: soft, distended, NT, +BSx4  Extremities: WWP, no edema, clubbing or cyanosis  Vascular: 2+ radial, DP/PT pulses B/L  Neurological: AAOx3, no focal deficits, strength 4/5 in LE, strength 5/5 in UE; diminished sensation in LE up to belly button       LABS:                        11.2   9.36  )-----------( 201      ( 06 May 2022 07:48 )             35.5     05-06    140  |  102  |  21  ----------------------------<  99  4.1   |  27  |  1.17    Ca    8.8      06 May 2022 07:48  Phos  4.0     05-06  Mg     2.2     05-06    TPro  5.3<L>  /  Alb  4.1  /  TBili  0.6  /  DBili  x   /  AST  34  /  ALT  67<H>  /  AlkPhos  45  05-06        CAPILLARY BLOOD GLUCOSE              RADIOLOGY & ADDITIONAL TESTS: Reviewed.

## 2022-05-07 NOTE — PROGRESS NOTE ADULT - ATTENDING COMMENTS
Feeling well this morning as his rehab appeal was approved!  Exam stable.  Continue PT. Plan for discharge to rehab on Monday.

## 2022-05-08 LAB
ALBUMIN SERPL ELPH-MCNC: 4.2 G/DL — SIGNIFICANT CHANGE UP (ref 3.3–5)
ALP SERPL-CCNC: 47 U/L — SIGNIFICANT CHANGE UP (ref 40–120)
ALT FLD-CCNC: 54 U/L — HIGH (ref 10–45)
ANION GAP SERPL CALC-SCNC: 11 MMOL/L — SIGNIFICANT CHANGE UP (ref 5–17)
AST SERPL-CCNC: 21 U/L — SIGNIFICANT CHANGE UP (ref 10–40)
BASOPHILS # BLD AUTO: 0.01 K/UL — SIGNIFICANT CHANGE UP (ref 0–0.2)
BASOPHILS NFR BLD AUTO: 0.1 % — SIGNIFICANT CHANGE UP (ref 0–2)
BILIRUB SERPL-MCNC: 0.6 MG/DL — SIGNIFICANT CHANGE UP (ref 0.2–1.2)
BUN SERPL-MCNC: 21 MG/DL — SIGNIFICANT CHANGE UP (ref 7–23)
CALCIUM SERPL-MCNC: 9.3 MG/DL — SIGNIFICANT CHANGE UP (ref 8.4–10.5)
CHLORIDE SERPL-SCNC: 99 MMOL/L — SIGNIFICANT CHANGE UP (ref 96–108)
CO2 SERPL-SCNC: 27 MMOL/L — SIGNIFICANT CHANGE UP (ref 22–31)
CREAT SERPL-MCNC: 1.23 MG/DL — SIGNIFICANT CHANGE UP (ref 0.5–1.3)
EGFR: 63 ML/MIN/1.73M2 — SIGNIFICANT CHANGE UP
EOSINOPHIL # BLD AUTO: 0.13 K/UL — SIGNIFICANT CHANGE UP (ref 0–0.5)
EOSINOPHIL NFR BLD AUTO: 1.6 % — SIGNIFICANT CHANGE UP (ref 0–6)
GLUCOSE SERPL-MCNC: 94 MG/DL — SIGNIFICANT CHANGE UP (ref 70–99)
HCT VFR BLD CALC: 36 % — LOW (ref 39–50)
HGB BLD-MCNC: 11.3 G/DL — LOW (ref 13–17)
IMM GRANULOCYTES NFR BLD AUTO: 1.7 % — HIGH (ref 0–1.5)
LYMPHOCYTES # BLD AUTO: 2.41 K/UL — SIGNIFICANT CHANGE UP (ref 1–3.3)
LYMPHOCYTES # BLD AUTO: 29.1 % — SIGNIFICANT CHANGE UP (ref 13–44)
MAGNESIUM SERPL-MCNC: 2.2 MG/DL — SIGNIFICANT CHANGE UP (ref 1.6–2.6)
MCHC RBC-ENTMCNC: 28.6 PG — SIGNIFICANT CHANGE UP (ref 27–34)
MCHC RBC-ENTMCNC: 31.4 GM/DL — LOW (ref 32–36)
MCV RBC AUTO: 91.1 FL — SIGNIFICANT CHANGE UP (ref 80–100)
MONOCYTES # BLD AUTO: 0.41 K/UL — SIGNIFICANT CHANGE UP (ref 0–0.9)
MONOCYTES NFR BLD AUTO: 5 % — SIGNIFICANT CHANGE UP (ref 2–14)
NEUTROPHILS # BLD AUTO: 5.18 K/UL — SIGNIFICANT CHANGE UP (ref 1.8–7.4)
NEUTROPHILS NFR BLD AUTO: 62.5 % — SIGNIFICANT CHANGE UP (ref 43–77)
NRBC # BLD: 0 /100 WBCS — SIGNIFICANT CHANGE UP (ref 0–0)
PHOSPHATE SERPL-MCNC: 4.8 MG/DL — HIGH (ref 2.5–4.5)
PLATELET # BLD AUTO: 192 K/UL — SIGNIFICANT CHANGE UP (ref 150–400)
POTASSIUM SERPL-MCNC: 4.3 MMOL/L — SIGNIFICANT CHANGE UP (ref 3.5–5.3)
POTASSIUM SERPL-SCNC: 4.3 MMOL/L — SIGNIFICANT CHANGE UP (ref 3.5–5.3)
PROT SERPL-MCNC: 5.4 G/DL — LOW (ref 6–8.3)
RBC # BLD: 3.95 M/UL — LOW (ref 4.2–5.8)
RBC # FLD: 14.8 % — HIGH (ref 10.3–14.5)
SODIUM SERPL-SCNC: 137 MMOL/L — SIGNIFICANT CHANGE UP (ref 135–145)
WBC # BLD: 8.28 K/UL — SIGNIFICANT CHANGE UP (ref 3.8–10.5)
WBC # FLD AUTO: 8.28 K/UL — SIGNIFICANT CHANGE UP (ref 3.8–10.5)

## 2022-05-08 PROCEDURE — 99231 SBSQ HOSP IP/OBS SF/LOW 25: CPT

## 2022-05-08 RX ADMIN — POLYETHYLENE GLYCOL 3350 17 GRAM(S): 17 POWDER, FOR SOLUTION ORAL at 13:34

## 2022-05-08 RX ADMIN — PANTOPRAZOLE SODIUM 40 MILLIGRAM(S): 20 TABLET, DELAYED RELEASE ORAL at 18:50

## 2022-05-08 RX ADMIN — SENNA PLUS 1 TABLET(S): 8.6 TABLET ORAL at 22:55

## 2022-05-08 RX ADMIN — PANTOPRAZOLE SODIUM 40 MILLIGRAM(S): 20 TABLET, DELAYED RELEASE ORAL at 05:31

## 2022-05-08 RX ADMIN — ENOXAPARIN SODIUM 40 MILLIGRAM(S): 100 INJECTION SUBCUTANEOUS at 22:56

## 2022-05-08 RX ADMIN — PREGABALIN 1000 MICROGRAM(S): 225 CAPSULE ORAL at 13:33

## 2022-05-08 RX ADMIN — TAMSULOSIN HYDROCHLORIDE 0.4 MILLIGRAM(S): 0.4 CAPSULE ORAL at 22:55

## 2022-05-08 RX ADMIN — SIMETHICONE 80 MILLIGRAM(S): 80 TABLET, CHEWABLE ORAL at 18:50

## 2022-05-08 RX ADMIN — Medication 5 MILLIGRAM(S): at 22:55

## 2022-05-08 RX ADMIN — SIMETHICONE 80 MILLIGRAM(S): 80 TABLET, CHEWABLE ORAL at 05:31

## 2022-05-08 RX ADMIN — GABAPENTIN 100 MILLIGRAM(S): 400 CAPSULE ORAL at 22:55

## 2022-05-08 RX ADMIN — GABAPENTIN 100 MILLIGRAM(S): 400 CAPSULE ORAL at 13:33

## 2022-05-08 RX ADMIN — Medication 50 MILLIGRAM(S): at 05:31

## 2022-05-08 RX ADMIN — LACTULOSE 5 GRAM(S): 10 SOLUTION ORAL at 13:33

## 2022-05-08 RX ADMIN — GABAPENTIN 100 MILLIGRAM(S): 400 CAPSULE ORAL at 05:31

## 2022-05-08 NOTE — PROGRESS NOTE ADULT - SUBJECTIVE AND OBJECTIVE BOX
OVERNIGHT EVENTS: NAOE    SUBJECTIVE / INTERVAL HPI: Patient seen and examined at bedside. Pt reports improvement in his LE strength. Denies f/c, n/v, HA, chest pain, SOB, abdominal pain, diarrhea, constipation, melena, hematochezia, hematuria, dysuria    MEDICATIONS  (STANDING):  cyanocobalamin 1000 MICROGram(s) Oral daily  enoxaparin Injectable 40 milliGRAM(s) SubCutaneous every 24 hours  gabapentin 100 milliGRAM(s) Oral three times a day  heparin  Lock Flush 100 Units/mL Injectable 600 Unit(s) IV Push once  lactulose Syrup 5 Gram(s) Oral daily  melatonin 5 milliGRAM(s) Oral at bedtime  pantoprazole    Tablet 40 milliGRAM(s) Oral two times a day  polyethylene glycol 3350 17 Gram(s) Oral daily  predniSONE   Tablet 50 milliGRAM(s) Oral every 24 hours  senna 1 Tablet(s) Oral at bedtime  simethicone 80 milliGRAM(s) Chew two times a day  tamsulosin 0.4 milliGRAM(s) Oral at bedtime    MEDICATIONS  (PRN):  acetaminophen     Tablet .. 650 milliGRAM(s) Oral every 6 hours PRN Temp greater or equal to 38C (100.4F), Mild Pain (1 - 3)  sodium chloride 0.9% lock flush 10 milliLiter(s) IV Push every 1 hour PRN Pre/post blood products, medications, blood draw, and to maintain line patency    Allergies    No Known Allergies    Intolerances        VITAL SIGNS:  Vital Signs Last 24 Hrs  T(C): 36.6 (07 May 2022 06:12), Max: 37 (06 May 2022 22:29)  T(F): 97.9 (07 May 2022 06:12), Max: 98.6 (06 May 2022 22:29)  HR: 67 (07 May 2022 06:12) (67 - 70)  BP: 102/62 (07 May 2022 06:12) (102/62 - 132/73)  BP(mean): --  RR: 18 (07 May 2022 06:12) (18 - 18)  SpO2: 98% (07 May 2022 06:12) (96% - 98%)      05-06-22 @ 07:01  -  05-07-22 @ 07:00  --------------------------------------------------------  IN: 0 mL / OUT: 1700 mL / NET: -1700 mL        PHYSICAL EXAM:  General: NAD, Laying comfortably in bed  HEENT: NC/AT, anicteric sclera, MMM  Neck: supple  Cardiovascular: +S1/S2, RRR, No murmurs, rubs, gallops  Respiratory: CTA B/L, no W/R/R  Gastrointestinal: soft, distended, NT, +BSx4  Extremities: WWP, no edema, clubbing or cyanosis  Vascular: 2+ radial, DP/PT pulses B/L  Neurological: AAOx3, no focal deficits, strength 4/5 in LE, strength 5/5 in UE; diminished sensation in LE up to belly button       LABS:                        11.2   9.36  )-----------( 201      ( 06 May 2022 07:48 )             35.5     05-06    140  |  102  |  21  ----------------------------<  99  4.1   |  27  |  1.17    Ca    8.8      06 May 2022 07:48  Phos  4.0     05-06  Mg     2.2     05-06    TPro  5.3<L>  /  Alb  4.1  /  TBili  0.6  /  DBili  x   /  AST  34  /  ALT  67<H>  /  AlkPhos  45  05-06        CAPILLARY BLOOD GLUCOSE              RADIOLOGY & ADDITIONAL TESTS: Reviewed.

## 2022-05-08 NOTE — PROGRESS NOTE ADULT - PROBLEM SELECTOR PLAN 1
Hx diarrhea two weeks prior to acute LE weakness, imaging consistent with transverse myelitis. Elevated inflammatory markers. Lumbar tap done at [facility]-for which studies pending. MR head and CSpine w/wout - faint T2 hyperintensity in posterior lower cervical cord without definite enhancement. NMO and anti-MOG antibodies negative  - s/p x3d high dose steroids with minimal improvement in symptoms.   - s/p IVIG 0.65g/kg x3 days with minimal improvement in symptoms.  - Autoimmune myelopathy panel sent 4/20 6pm  - HD cath placed 4/19 for PLEX starting 4/20 x7 sessions every other day with heme onc--patient completed course on 5/2 with dramatic improvement in LE strength   - reduce pred to 40 tomorrow and continue taper by 10 per week  - failed TOV on 4/25, will continue nation, will do TOV at acute rehab  - to be discharged to acute rehab once treatment is complete--pending auth

## 2022-05-08 NOTE — PROGRESS NOTE ADULT - PROBLEM SELECTOR PLAN 2
CT AP 2.3 x 2.4 x 3.1 at pancreatic head, as per records, workup initiated at [facility]; however, unsure of results.  - Ca-19-9 and CEA negative at [facility]   - MR abdomen/pelvis w cont - pancreatic mass indeterminate etiology    - f/u GI recs  - per GI, EGD/EUS procedure to be performed after plasmapheresis and acute neurologic issues resolved. Will defer to outpatient as elective procedure.

## 2022-05-08 NOTE — PROGRESS NOTE ADULT - PROBLEM SELECTOR PLAN 3
Patient initially to [facility] with urosepsis c/b urinary retention, likely 2/2 neurological process. Nation placed at [facility].   -continue with flomax 0.4 QHS   -neuro workup as above  - patient nation'ed on 4/22 due to retention   - failed TOV on 4/25, will continue nation, will do TOV at acute rehab      #Fecal retention  Passing gas, incontinent at times, difficulty controlling BMs  - lactulose 5g daily   - miralax 17g daily   - senna 1 tablet qhs

## 2022-05-08 NOTE — PROGRESS NOTE ADULT - ASSESSMENT
72 yo M with no PMHx, presented with progressive ascending bilateral lower extremity weakness and numbness, admitted for transverse Myelitis, s/p plasmapharesis and now on steroid taper, currently regaining strength in LE, pending acute rehab

## 2022-05-08 NOTE — PROGRESS NOTE ADULT - PROBLEM SELECTOR PLAN 4
F-none    E-replete PRN    N-regular diet      DVT-lovenox 40 qd  GI-PPI 40 qd  Code: FULL  Dispo: pending placement to acute rehab -- discharge tomorrow

## 2022-05-09 ENCOUNTER — TRANSCRIPTION ENCOUNTER (OUTPATIENT)
Age: 72
End: 2022-05-09

## 2022-05-09 VITALS
DIASTOLIC BLOOD PRESSURE: 64 MMHG | OXYGEN SATURATION: 94 % | TEMPERATURE: 99 F | HEART RATE: 66 BPM | RESPIRATION RATE: 19 BRPM | SYSTOLIC BLOOD PRESSURE: 106 MMHG

## 2022-05-09 LAB
ALBUMIN SERPL ELPH-MCNC: 3.9 G/DL — SIGNIFICANT CHANGE UP (ref 3.3–5)
ALP SERPL-CCNC: 50 U/L — SIGNIFICANT CHANGE UP (ref 40–120)
ALT FLD-CCNC: 40 U/L — SIGNIFICANT CHANGE UP (ref 10–45)
ANION GAP SERPL CALC-SCNC: 10 MMOL/L — SIGNIFICANT CHANGE UP (ref 5–17)
AST SERPL-CCNC: 15 U/L — SIGNIFICANT CHANGE UP (ref 10–40)
BASOPHILS # BLD AUTO: 0.02 K/UL — SIGNIFICANT CHANGE UP (ref 0–0.2)
BASOPHILS NFR BLD AUTO: 0.3 % — SIGNIFICANT CHANGE UP (ref 0–2)
BILIRUB SERPL-MCNC: 0.6 MG/DL — SIGNIFICANT CHANGE UP (ref 0.2–1.2)
BUN SERPL-MCNC: 19 MG/DL — SIGNIFICANT CHANGE UP (ref 7–23)
CALCIUM SERPL-MCNC: 8.7 MG/DL — SIGNIFICANT CHANGE UP (ref 8.4–10.5)
CHLORIDE SERPL-SCNC: 103 MMOL/L — SIGNIFICANT CHANGE UP (ref 96–108)
CO2 SERPL-SCNC: 28 MMOL/L — SIGNIFICANT CHANGE UP (ref 22–31)
CREAT SERPL-MCNC: 1.28 MG/DL — SIGNIFICANT CHANGE UP (ref 0.5–1.3)
EGFR: 60 ML/MIN/1.73M2 — SIGNIFICANT CHANGE UP
EOSINOPHIL # BLD AUTO: 0.13 K/UL — SIGNIFICANT CHANGE UP (ref 0–0.5)
EOSINOPHIL NFR BLD AUTO: 1.7 % — SIGNIFICANT CHANGE UP (ref 0–6)
GLUCOSE SERPL-MCNC: 100 MG/DL — HIGH (ref 70–99)
HCT VFR BLD CALC: 35.3 % — LOW (ref 39–50)
HGB BLD-MCNC: 10.9 G/DL — LOW (ref 13–17)
IMM GRANULOCYTES NFR BLD AUTO: 1.2 % — SIGNIFICANT CHANGE UP (ref 0–1.5)
LYMPHOCYTES # BLD AUTO: 1.69 K/UL — SIGNIFICANT CHANGE UP (ref 1–3.3)
LYMPHOCYTES # BLD AUTO: 22.7 % — SIGNIFICANT CHANGE UP (ref 13–44)
MAGNESIUM SERPL-MCNC: 2.3 MG/DL — SIGNIFICANT CHANGE UP (ref 1.6–2.6)
MCHC RBC-ENTMCNC: 28.5 PG — SIGNIFICANT CHANGE UP (ref 27–34)
MCHC RBC-ENTMCNC: 30.9 GM/DL — LOW (ref 32–36)
MCV RBC AUTO: 92.4 FL — SIGNIFICANT CHANGE UP (ref 80–100)
MONOCYTES # BLD AUTO: 0.37 K/UL — SIGNIFICANT CHANGE UP (ref 0–0.9)
MONOCYTES NFR BLD AUTO: 5 % — SIGNIFICANT CHANGE UP (ref 2–14)
NEUTROPHILS # BLD AUTO: 5.15 K/UL — SIGNIFICANT CHANGE UP (ref 1.8–7.4)
NEUTROPHILS NFR BLD AUTO: 69.1 % — SIGNIFICANT CHANGE UP (ref 43–77)
NRBC # BLD: 0 /100 WBCS — SIGNIFICANT CHANGE UP (ref 0–0)
PHOSPHATE SERPL-MCNC: 4.1 MG/DL — SIGNIFICANT CHANGE UP (ref 2.5–4.5)
PLATELET # BLD AUTO: 186 K/UL — SIGNIFICANT CHANGE UP (ref 150–400)
POTASSIUM SERPL-MCNC: 4 MMOL/L — SIGNIFICANT CHANGE UP (ref 3.5–5.3)
POTASSIUM SERPL-SCNC: 4 MMOL/L — SIGNIFICANT CHANGE UP (ref 3.5–5.3)
PROT SERPL-MCNC: 5.2 G/DL — LOW (ref 6–8.3)
RBC # BLD: 3.82 M/UL — LOW (ref 4.2–5.8)
RBC # FLD: 15 % — HIGH (ref 10.3–14.5)
SARS-COV-2 RNA SPEC QL NAA+PROBE: SIGNIFICANT CHANGE UP
SODIUM SERPL-SCNC: 141 MMOL/L — SIGNIFICANT CHANGE UP (ref 135–145)
WBC # BLD: 7.45 K/UL — SIGNIFICANT CHANGE UP (ref 3.8–10.5)
WBC # FLD AUTO: 7.45 K/UL — SIGNIFICANT CHANGE UP (ref 3.8–10.5)

## 2022-05-09 PROCEDURE — 87635 SARS-COV-2 COVID-19 AMP PRB: CPT

## 2022-05-09 PROCEDURE — 97161 PT EVAL LOW COMPLEX 20 MIN: CPT

## 2022-05-09 PROCEDURE — 97116 GAIT TRAINING THERAPY: CPT

## 2022-05-09 PROCEDURE — 87799 DETECT AGENT NOS DNA QUANT: CPT

## 2022-05-09 PROCEDURE — 70553 MRI BRAIN STEM W/O & W/DYE: CPT

## 2022-05-09 PROCEDURE — 80053 COMPREHEN METABOLIC PANEL: CPT

## 2022-05-09 PROCEDURE — 86160 COMPLEMENT ANTIGEN: CPT

## 2022-05-09 PROCEDURE — 36430 TRANSFUSION BLD/BLD COMPNT: CPT

## 2022-05-09 PROCEDURE — 85730 THROMBOPLASTIN TIME PARTIAL: CPT

## 2022-05-09 PROCEDURE — 82553 CREATINE MB FRACTION: CPT

## 2022-05-09 PROCEDURE — 82390 ASSAY OF CERULOPLASMIN: CPT

## 2022-05-09 PROCEDURE — 92610 EVALUATE SWALLOWING FUNCTION: CPT

## 2022-05-09 PROCEDURE — 83550 IRON BINDING TEST: CPT

## 2022-05-09 PROCEDURE — 82164 ANGIOTENSIN I ENZYME TEST: CPT

## 2022-05-09 PROCEDURE — 86901 BLOOD TYPING SEROLOGIC RH(D): CPT

## 2022-05-09 PROCEDURE — 80061 LIPID PANEL: CPT

## 2022-05-09 PROCEDURE — 86664 EPSTEIN-BARR NUCLEAR ANTIGEN: CPT

## 2022-05-09 PROCEDURE — 82378 CARCINOEMBRYONIC ANTIGEN: CPT

## 2022-05-09 PROCEDURE — 83970 ASSAY OF PARATHORMONE: CPT

## 2022-05-09 PROCEDURE — 86038 ANTINUCLEAR ANTIBODIES: CPT

## 2022-05-09 PROCEDURE — 84446 ASSAY OF VITAMIN E: CPT

## 2022-05-09 PROCEDURE — 82310 ASSAY OF CALCIUM: CPT

## 2022-05-09 PROCEDURE — 82652 VIT D 1 25-DIHYDROXY: CPT

## 2022-05-09 PROCEDURE — 86705 HEP B CORE ANTIBODY IGM: CPT

## 2022-05-09 PROCEDURE — 84597 ASSAY OF VITAMIN K: CPT

## 2022-05-09 PROCEDURE — 83921 ORGANIC ACID SINGLE QUANT: CPT

## 2022-05-09 PROCEDURE — 83036 HEMOGLOBIN GLYCOSYLATED A1C: CPT

## 2022-05-09 PROCEDURE — 87389 HIV-1 AG W/HIV-1&-2 AB AG IA: CPT

## 2022-05-09 PROCEDURE — 86592 SYPHILIS TEST NON-TREP QUAL: CPT

## 2022-05-09 PROCEDURE — A9585: CPT

## 2022-05-09 PROCEDURE — 82306 VITAMIN D 25 HYDROXY: CPT

## 2022-05-09 PROCEDURE — 85379 FIBRIN DEGRADATION QUANT: CPT

## 2022-05-09 PROCEDURE — 82550 ASSAY OF CK (CPK): CPT

## 2022-05-09 PROCEDURE — 83735 ASSAY OF MAGNESIUM: CPT

## 2022-05-09 PROCEDURE — 74183 MRI ABD W/O CNTR FLWD CNTR: CPT

## 2022-05-09 PROCEDURE — 97530 THERAPEUTIC ACTIVITIES: CPT

## 2022-05-09 PROCEDURE — 86706 HEP B SURFACE ANTIBODY: CPT

## 2022-05-09 PROCEDURE — 82105 ALPHA-FETOPROTEIN SERUM: CPT

## 2022-05-09 PROCEDURE — 93005 ELECTROCARDIOGRAM TRACING: CPT

## 2022-05-09 PROCEDURE — 87086 URINE CULTURE/COLONY COUNT: CPT

## 2022-05-09 PROCEDURE — 82103 ALPHA-1-ANTITRYPSIN TOTAL: CPT

## 2022-05-09 PROCEDURE — 71045 X-RAY EXAM CHEST 1 VIEW: CPT

## 2022-05-09 PROCEDURE — 76705 ECHO EXAM OF ABDOMEN: CPT

## 2022-05-09 PROCEDURE — 86053 AQAPRN-4 ANTB FLO CYTMTRY EA: CPT

## 2022-05-09 PROCEDURE — 82728 ASSAY OF FERRITIN: CPT

## 2022-05-09 PROCEDURE — 36415 COLL VENOUS BLD VENIPUNCTURE: CPT

## 2022-05-09 PROCEDURE — 97164 PT RE-EVAL EST PLAN CARE: CPT

## 2022-05-09 PROCEDURE — 87040 BLOOD CULTURE FOR BACTERIA: CPT

## 2022-05-09 PROCEDURE — 86965 POOLING BLOOD PLATELETS: CPT

## 2022-05-09 PROCEDURE — 85025 COMPLETE CBC W/AUTO DIFF WBC: CPT

## 2022-05-09 PROCEDURE — 85384 FIBRINOGEN ACTIVITY: CPT

## 2022-05-09 PROCEDURE — U0003: CPT

## 2022-05-09 PROCEDURE — 86900 BLOOD TYPING SEROLOGIC ABO: CPT

## 2022-05-09 PROCEDURE — 97110 THERAPEUTIC EXERCISES: CPT

## 2022-05-09 PROCEDURE — 86663 EPSTEIN-BARR ANTIBODY: CPT

## 2022-05-09 PROCEDURE — 83540 ASSAY OF IRON: CPT

## 2022-05-09 PROCEDURE — 84484 ASSAY OF TROPONIN QUANT: CPT

## 2022-05-09 PROCEDURE — 86790 VIRUS ANTIBODY NOS: CPT

## 2022-05-09 PROCEDURE — 86850 RBC ANTIBODY SCREEN: CPT

## 2022-05-09 PROCEDURE — 86235 NUCLEAR ANTIGEN ANTIBODY: CPT

## 2022-05-09 PROCEDURE — 86704 HEP B CORE ANTIBODY TOTAL: CPT

## 2022-05-09 PROCEDURE — 72156 MRI NECK SPINE W/O & W/DYE: CPT

## 2022-05-09 PROCEDURE — 86709 HEPATITIS A IGM ANTIBODY: CPT

## 2022-05-09 PROCEDURE — 86665 EPSTEIN-BARR CAPSID VCA: CPT

## 2022-05-09 PROCEDURE — 87517 HEPATITIS B DNA QUANT: CPT

## 2022-05-09 PROCEDURE — 86362 MOG-IGG1 ANTB CBA EACH: CPT

## 2022-05-09 PROCEDURE — 87340 HEPATITIS B SURFACE AG IA: CPT

## 2022-05-09 PROCEDURE — 97112 NEUROMUSCULAR REEDUCATION: CPT

## 2022-05-09 PROCEDURE — 86301 IMMUNOASSAY TUMOR CA 19-9: CPT

## 2022-05-09 PROCEDURE — 84100 ASSAY OF PHOSPHORUS: CPT

## 2022-05-09 PROCEDURE — 86692 HEPATITIS DELTA AGENT ANTBDY: CPT

## 2022-05-09 PROCEDURE — 86140 C-REACTIVE PROTEIN: CPT

## 2022-05-09 PROCEDURE — 71275 CT ANGIOGRAPHY CHEST: CPT

## 2022-05-09 PROCEDURE — 87186 SC STD MICRODIL/AGAR DIL: CPT

## 2022-05-09 PROCEDURE — 97535 SELF CARE MNGMENT TRAINING: CPT

## 2022-05-09 PROCEDURE — 86780 TREPONEMA PALLIDUM: CPT

## 2022-05-09 PROCEDURE — 85610 PROTHROMBIN TIME: CPT

## 2022-05-09 PROCEDURE — P9045: CPT

## 2022-05-09 PROCEDURE — 81001 URINALYSIS AUTO W/SCOPE: CPT

## 2022-05-09 PROCEDURE — 87798 DETECT AGENT NOS DNA AMP: CPT

## 2022-05-09 PROCEDURE — U0005: CPT

## 2022-05-09 PROCEDURE — P9012: CPT

## 2022-05-09 PROCEDURE — 86803 HEPATITIS C AB TEST: CPT

## 2022-05-09 RX ADMIN — GABAPENTIN 100 MILLIGRAM(S): 400 CAPSULE ORAL at 06:29

## 2022-05-09 RX ADMIN — POLYETHYLENE GLYCOL 3350 17 GRAM(S): 17 POWDER, FOR SOLUTION ORAL at 11:34

## 2022-05-09 RX ADMIN — Medication 50 MILLIGRAM(S): at 06:29

## 2022-05-09 RX ADMIN — PANTOPRAZOLE SODIUM 40 MILLIGRAM(S): 20 TABLET, DELAYED RELEASE ORAL at 06:29

## 2022-05-09 RX ADMIN — PREGABALIN 1000 MICROGRAM(S): 225 CAPSULE ORAL at 11:33

## 2022-05-09 RX ADMIN — SIMETHICONE 80 MILLIGRAM(S): 80 TABLET, CHEWABLE ORAL at 06:29

## 2022-05-09 RX ADMIN — GABAPENTIN 100 MILLIGRAM(S): 400 CAPSULE ORAL at 14:39

## 2022-05-09 RX ADMIN — LACTULOSE 5 GRAM(S): 10 SOLUTION ORAL at 11:33

## 2022-05-09 NOTE — PROGRESS NOTE ADULT - PROBLEM SELECTOR PLAN 2
CT AP 2.3 x 2.4 x 3.1 at pancreatic head, as per records, workup initiated at [facility]; however, unsure of results.  - Ca-19-9 and CEA negative at [facility]   - MR abdomen/pelvis w cont - pancreatic mass indeterminate etiology    - f/u GI recs  - per GI, EGD/EUS procedure to be performed after plasmapheresis and acute neurologic issues resolved. Will defer to outpatient as elective procedure.

## 2022-05-09 NOTE — PROGRESS NOTE ADULT - PROBLEM SELECTOR PROBLEM 1
Lower extremity weakness

## 2022-05-09 NOTE — PROGRESS NOTE ADULT - PROBLEM SELECTOR PLAN 3
Patient initially to [facility] with urosepsis c/b urinary retention, likely 2/2 neurological process. Nation placed at [facility].   -continue with flomax 0.4 QHS   -neuro workup as above  - patient nation'ed on 4/22 due to retention   - failed TOV on 4/25, will continue nation, will do TOV at acute rehab      #Fecal retention  Passing gas, incontinent at times, difficulty controlling BMs  - lactulose 5g daily   - miralax 17g daily   - senna 1 tablet qhs

## 2022-05-09 NOTE — PROGRESS NOTE ADULT - PROBLEM SELECTOR PLAN 1
Hx diarrhea two weeks prior to acute LE weakness, imaging consistent with transverse myelitis. Elevated inflammatory markers. Lumbar tap done at [facility]-for which studies pending. MR head and CSpine w/wout - faint T2 hyperintensity in posterior lower cervical cord without definite enhancement. NMO and anti-MOG antibodies negative  - s/p x3d high dose steroids with minimal improvement in symptoms.   - s/p IVIG 0.65g/kg x3 days with minimal improvement in symptoms.  - Autoimmune myelopathy panel sent 4/20 6pm  - HD cath placed 4/19 for PLEX starting 4/20 x7 sessions every other day with heme onc--patient completed course on 5/2 with dramatic improvement in LE strength   - started 60mg PO prednisone daily on 4/24 - decrease by 10mg every week--currently 50mg this week of 5/1   - failed TOV on 4/25, will continue nation, will do TOV at acute rehab  - to be discharged to acute rehab once treatment is complete--pending auth

## 2022-05-09 NOTE — PROGRESS NOTE ADULT - SUBJECTIVE AND OBJECTIVE BOX
**Incomplete Note**  OVERNIGHT EVENTS:    SUBJECTIVE / INTERVAL HPI: Patient seen and examined at bedside. Denies f/c, n/v, HA, chest pain, SOB, abdominal pain, diarrhea, constipation, melena, hematochezia, hematuria, dysuria    MEDICATIONS  (STANDING):  cyanocobalamin 1000 MICROGram(s) Oral daily  enoxaparin Injectable 40 milliGRAM(s) SubCutaneous every 24 hours  gabapentin 100 milliGRAM(s) Oral three times a day  heparin  Lock Flush 100 Units/mL Injectable 600 Unit(s) IV Push once  lactulose Syrup 5 Gram(s) Oral daily  melatonin 5 milliGRAM(s) Oral at bedtime  pantoprazole    Tablet 40 milliGRAM(s) Oral two times a day  polyethylene glycol 3350 17 Gram(s) Oral daily  predniSONE   Tablet 50 milliGRAM(s) Oral every 24 hours  senna 1 Tablet(s) Oral at bedtime  simethicone 80 milliGRAM(s) Chew two times a day  tamsulosin 0.4 milliGRAM(s) Oral at bedtime    MEDICATIONS  (PRN):  acetaminophen     Tablet .. 650 milliGRAM(s) Oral every 6 hours PRN Temp greater or equal to 38C (100.4F), Mild Pain (1 - 3)  sodium chloride 0.9% lock flush 10 milliLiter(s) IV Push every 1 hour PRN Pre/post blood products, medications, blood draw, and to maintain line patency    Allergies    No Known Allergies    Intolerances        VITAL SIGNS:  Vital Signs Last 24 Hrs  T(C): 37 (09 May 2022 06:42), Max: 37 (09 May 2022 06:42)  T(F): 98.6 (09 May 2022 06:42), Max: 98.6 (09 May 2022 06:42)  HR: 66 (09 May 2022 06:42) (66 - 67)  BP: 106/64 (09 May 2022 06:42) (106/64 - 125/70)  BP(mean): --  RR: 19 (09 May 2022 06:42) (18 - 19)  SpO2: 94% (09 May 2022 06:42) (94% - 97%)      05-08-22 @ 07:01  -  05-09-22 @ 07:00  --------------------------------------------------------  IN: 0 mL / OUT: 1100 mL / NET: -1100 mL        PHYSICAL EXAM:  General: NAD, Laying comfortably in bed  HEENT: NC/AT, anicteric sclera, MMM  Neck: supple  Cardiovascular: +S1/S2, RRR, No murmurs, rubs, gallops  Respiratory: CTA B/L, no W/R/R  Gastrointestinal: soft, NT/ND, +BSx4  Extremities: WWP, no edema, clubbing or cyanosis  Vascular: 2+ radial, DP/PT pulses B/L  Neurological: AAOx3, no focal deficits      LABS:                        11.3   8.28  )-----------( 192      ( 08 May 2022 07:06 )             36.0     05-08    137  |  99  |  21  ----------------------------<  94  4.3   |  27  |  1.23    Ca    9.3      08 May 2022 07:06  Phos  4.8     05-08  Mg     2.2     05-08    TPro  5.4<L>  /  Alb  4.2  /  TBili  0.6  /  DBili  x   /  AST  21  /  ALT  54<H>  /  AlkPhos  47  05-08        CAPILLARY BLOOD GLUCOSE              RADIOLOGY & ADDITIONAL TESTS: Reviewed. OVERNIGHT EVENTS: NAOE    SUBJECTIVE / INTERVAL HPI: Patient seen and examined at bedside. Denies f/c, n/v, HA, chest pain, SOB, abdominal pain, diarrhea, constipation, melena, hematochezia, hematuria, dysuria    MEDICATIONS  (STANDING):  cyanocobalamin 1000 MICROGram(s) Oral daily  enoxaparin Injectable 40 milliGRAM(s) SubCutaneous every 24 hours  gabapentin 100 milliGRAM(s) Oral three times a day  heparin  Lock Flush 100 Units/mL Injectable 600 Unit(s) IV Push once  lactulose Syrup 5 Gram(s) Oral daily  melatonin 5 milliGRAM(s) Oral at bedtime  pantoprazole    Tablet 40 milliGRAM(s) Oral two times a day  polyethylene glycol 3350 17 Gram(s) Oral daily  predniSONE   Tablet 50 milliGRAM(s) Oral every 24 hours  senna 1 Tablet(s) Oral at bedtime  simethicone 80 milliGRAM(s) Chew two times a day  tamsulosin 0.4 milliGRAM(s) Oral at bedtime    MEDICATIONS  (PRN):  acetaminophen     Tablet .. 650 milliGRAM(s) Oral every 6 hours PRN Temp greater or equal to 38C (100.4F), Mild Pain (1 - 3)  sodium chloride 0.9% lock flush 10 milliLiter(s) IV Push every 1 hour PRN Pre/post blood products, medications, blood draw, and to maintain line patency    Allergies    No Known Allergies    Intolerances        VITAL SIGNS:  Vital Signs Last 24 Hrs  T(C): 37 (09 May 2022 06:42), Max: 37 (09 May 2022 06:42)  T(F): 98.6 (09 May 2022 06:42), Max: 98.6 (09 May 2022 06:42)  HR: 66 (09 May 2022 06:42) (66 - 67)  BP: 106/64 (09 May 2022 06:42) (106/64 - 125/70)  BP(mean): --  RR: 19 (09 May 2022 06:42) (18 - 19)  SpO2: 94% (09 May 2022 06:42) (94% - 97%)      05-08-22 @ 07:01  -  05-09-22 @ 07:00  --------------------------------------------------------  IN: 0 mL / OUT: 1100 mL / NET: -1100 mL        PHYSICAL EXAM:  General: NAD, Laying comfortably in bed  HEENT: NC/AT, anicteric sclera, MMM  Neck: supple  Cardiovascular: +S1/S2, RRR, No murmurs, rubs, gallops  Respiratory: CTA B/L, no W/R/R  Gastrointestinal: soft, distended, NT, +BSx4  Extremities: WWP, no edema, clubbing or cyanosis  Vascular: 2+ radial, DP/PT pulses B/L  Neurological: AAOx3, no focal deficits, strength 4/5 in LE, strength 5/5 in UE; diminished sensation in LE up to belly button       LABS:                        11.3   8.28  )-----------( 192      ( 08 May 2022 07:06 )             36.0     05-08    137  |  99  |  21  ----------------------------<  94  4.3   |  27  |  1.23    Ca    9.3      08 May 2022 07:06  Phos  4.8     05-08  Mg     2.2     05-08    TPro  5.4<L>  /  Alb  4.2  /  TBili  0.6  /  DBili  x   /  AST  21  /  ALT  54<H>  /  AlkPhos  47  05-08        CAPILLARY BLOOD GLUCOSE              RADIOLOGY & ADDITIONAL TESTS: Reviewed.

## 2022-05-09 NOTE — PROGRESS NOTE ADULT - PROBLEM SELECTOR PROBLEM 4
Preventive measure
Transaminitis
Preventive measure
Transaminitis
Preventive measure
Transaminitis
Transaminitis
Preventive measure
Transaminitis
Transaminitis
Urinary retention
Transaminitis

## 2022-05-09 NOTE — DISCHARGE NOTE NURSING/CASE MANAGEMENT/SOCIAL WORK - NSDCFUADDAPPT_GEN_ALL_CORE_FT
Please follow up with Dr Bolton (neurology) and Dr Rojas (gastroenterology) at your scheduled appointment dates.

## 2022-05-09 NOTE — PROGRESS NOTE ADULT - PROBLEM SELECTOR PROBLEM 2
Pancreatic mass

## 2022-05-09 NOTE — PROGRESS NOTE ADULT - PROBLEM SELECTOR PROBLEM 3
Leukocytosis
Urinary retention
Urinary retention
Leukocytosis
Transaminitis
Leukocytosis
Urinary retention
Leukocytosis
Urinary retention
Leukocytosis
Leukocytosis
Urinary retention
Leukocytosis
Urinary retention
Leukocytosis

## 2022-05-09 NOTE — DISCHARGE NOTE NURSING/CASE MANAGEMENT/SOCIAL WORK - PATIENT PORTAL LINK FT
You can access the FollowMyHealth Patient Portal offered by [facility] by registering at the following website: [email or web address]. By joining [employer]’s FollowMyHealth portal, you will also be able to view your health information using other applications (apps) compatible with our system.
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
## 2022-05-11 DIAGNOSIS — D72.829 ELEVATED WHITE BLOOD CELL COUNT, UNSPECIFIED: ICD-10-CM
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
## 2022-05-11 DIAGNOSIS — G37.3 ACUTE TRANSVERSE MYELITIS IN DEMYELINATING DISEASE OF CENTRAL NERVOUS SYSTEM: ICD-10-CM

## 2022-05-11 DIAGNOSIS — R33.9 RETENTION OF URINE, UNSPECIFIED: ICD-10-CM

## 2022-05-11 DIAGNOSIS — B96.20 UNSPECIFIED ESCHERICHIA COLI [E. COLI] AS THE CAUSE OF DISEASES CLASSIFIED ELSEWHERE: ICD-10-CM

## 2022-05-11 DIAGNOSIS — Z16.12 EXTENDED SPECTRUM BETA LACTAMASE (ESBL) RESISTANCE: ICD-10-CM

## 2022-05-11 DIAGNOSIS — N39.0 URINARY TRACT INFECTION, SITE NOT SPECIFIED: ICD-10-CM

## 2022-05-11 DIAGNOSIS — R00.0 TACHYCARDIA, UNSPECIFIED: ICD-10-CM

## 2022-05-11 DIAGNOSIS — D68.8 OTHER SPECIFIED COAGULATION DEFECTS: ICD-10-CM

## 2022-05-11 DIAGNOSIS — T83.511A INFECTION AND INFLAMMATORY REACTION DUE TO INDWELLING URETHRAL CATHETER, INITIAL ENCOUNTER: ICD-10-CM

## 2022-05-11 DIAGNOSIS — K86.2 CYST OF PANCREAS: ICD-10-CM

## 2022-05-11 DIAGNOSIS — R74.01 ELEVATION OF LEVELS OF LIVER TRANSAMINASE LEVELS: ICD-10-CM

## 2022-05-11 DIAGNOSIS — K59.00 CONSTIPATION, UNSPECIFIED: ICD-10-CM
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
## 2022-05-13 LAB — MISCELLANEOUS TEST NAME: SIGNIFICANT CHANGE UP

## 2022-06-03 ENCOUNTER — APPOINTMENT (OUTPATIENT)
Dept: NEUROLOGY | Facility: CLINIC | Age: 72
End: 2022-06-03
Payer: MEDICARE

## 2022-06-03 PROCEDURE — 99215 OFFICE O/P EST HI 40 MIN: CPT | Mod: 95

## 2022-06-03 PROCEDURE — 99205 OFFICE O/P NEW HI 60 MIN: CPT | Mod: 95

## 2022-06-03 NOTE — HISTORY OF PRESENT ILLNESS
[Home] : at home, [unfilled] , at the time of the visit. [Medical Office: ([facility])___] : at the medical office located in  [Verbal consent obtained from patient] : the patient, [unfilled] [FreeTextEntry1] : Reason for consult: myelitis\par \par HPI: MARCIAL ROMANO is a 71 year old man \par \par 4/2022 - BL LE weakness. found to have LETM. CSF w/ ~120wbcs and +OCBs. Got IVMP and 7 sessions of PLEX.\par 6/2022 - Has been at [place] x4wks.\par Has been standing and walking with walker. \par Numbness and tingling has diminished somewhat. Numbness above bellybutton has improved; used to be numb from nipple-line down. Feels the floor in his toes now.\par No pain.\par Planning to go to [facility] next week.\par \par \par ROS/Current Sx:\par bladder dysfunction - not urinating, intermittent self cath when feels that he is full.\par bowel - needs enemas daily.\par \par \par PMHX:\par myelitis\par DVT 5/2022\par \par MEDS:\par flomax\par eliquis for DVT\par prednisone taper\par \par ALL:\par \par SHx:\par \par FHx:\par \par \par Exam:\par AO3.  Normally conversant.  Follows commands, names, and repeats.  Good attention.\par \par \par \par 5/2022\par negative NMO, MOG, and autoimmune myelopathy panel.\par \par CSF 4/2022\par .9\par protein 80\par gluc wnl\par +OCBs\par \par MRI brain 4/2022 - a few nonspecific t2h that are normal for age.\par \par MRI C+T 4/2022 - likely patchy cord signal change throughout, nonenhancing.\par \par \par AP: 72yo w/ likely idiopathic transverse myelitis in 4/2022, s/p IVMP, PLEX, and long prednisone taper. Substantial early improvement.\par \par all questions answered, education provided, management discussed at length. chart reviewed.\par \par - finish prednisone taper\par - spoke w/ Dr. Ana Phelan ([place]). cont PT.\par - advised GI f/u for pancreatic lesion\par - cont self cath, bladder training.\par - RTC 6wks\par  normal...

## 2022-06-03 NOTE — PROCEDURAL SAFETY CHECKLIST WITH OR WITHOUT SEDATION - NSSIDESITEMARKD_GEN_ALL_CORE
Spoke with patient to go over pre-procedure instruction for her liver biopsy on 6/6/22 at the [facility]  Plan to arrive for 0700, have a ride to and from, and NPO after midnight the night prior  Patient is able to take morning medications with sips of water, also aware of the 4 hour post observation period  Answered all questions, consult complete  done

## 2022-07-07 ENCOUNTER — FORM ENCOUNTER (OUTPATIENT)
Age: 72
End: 2022-07-07

## 2022-07-15 ENCOUNTER — APPOINTMENT (OUTPATIENT)
Dept: GASTROENTEROLOGY | Facility: CLINIC | Age: 72
End: 2022-07-15

## 2022-07-22 ENCOUNTER — APPOINTMENT (OUTPATIENT)
Dept: NEUROLOGY | Facility: CLINIC | Age: 72
End: 2022-07-22

## 2022-07-22 PROCEDURE — 99214 OFFICE O/P EST MOD 30 MIN: CPT | Mod: 95

## 2022-07-25 NOTE — HISTORY OF PRESENT ILLNESS
[Home] : at home, [unfilled] , at the time of the visit. [Medical Office: ([facility])___] : at the medical office located in  [Verbal consent obtained from patient] : the patient, [unfilled] [FreeTextEntry1] : Intial hx 7/2022\par 4/2022 - BL LE weakness. found to have LETM. CSF w/ ~120wbcs and +OCBs. Got IVMP and 7 sessions of PLEX.\par 6/2022 - Has been at [place] x4wks.\par Has been standing and walking with walker. \par Numbness and tingling has diminished somewhat. Numbness above bellybutton has improved; used to be numb from nipple-line down. Feels the floor in his toes now.\par No pain.\par Planning to go to [facility] next week.\par Home by 7/2022.\par \par \par \par Subj interval:\par \par Has started outpatient PT and home PT. Starting the SCIP program at [place]. Uses his home gym 1h/day.\par \par Using walker on his own, going up and down stairs on his own. \par \par Bladder dysfunction - improving, frequency and hesitancy but no longer cath'ing.\par \par Bowel - mini-enemas daily, 2 episodes of having good control of bowel.\par \par Mild leg tingling\par \par \par PMHX:\par myelitis\par DVT 5/2022\par \par MEDS:\par flomax\par eliquis for DVT\par B12\par antidepressant\par \par \par Exam:\par AO3. Normally conversant. Follows commands, names, and repeats. Good attention.\par moves both arms well.\par \par \par 5/2022\par negative NMO, MOG, and autoimmune myelopathy panel.\par \par CSF 4/2022\par .9\par protein 80\par gluc wnl\par +OCBs\par \par \par MRI brain 4/2022 - a few nonspecific t2h that are normal for age.\par \par MRI C+T 4/2022 - likely patchy cord signal change throughout, nonenhancing.\par \par \par AP: 70yo w/ likely idiopathic transverse myelitis in 4/2022, s/p IVMP, PLEX, and long prednisone taper. Substantial early improvement.\par \par all questions answered, education provided, management discussed at length. chart reviewed.\par \par - cont PT.\par - advised GI f/u for pancreatic lesion - he will see someone in [place]\par - cont bladder and bowel training\par - RTC 6wks\par \par

## 2022-08-05 ENCOUNTER — NON-APPOINTMENT (OUTPATIENT)
Age: 72
End: 2022-08-05

## 2022-08-05 DIAGNOSIS — I82.409 ACUTE EMBOLISM AND THROMBOSIS OF UNSPECIFIED DEEP VEINS OF UNSPECIFIED LOWER EXTREMITY: ICD-10-CM

## 2022-08-05 RX ORDER — TAMSULOSIN HYDROCHLORIDE 0.4 MG/1
0.4 CAPSULE ORAL DAILY
Qty: 60 | Refills: 2 | Status: ACTIVE | COMMUNITY
Start: 2022-08-05 | End: 1900-01-01

## 2022-08-05 RX ORDER — ESCITALOPRAM OXALATE 5 MG/1
5 TABLET ORAL DAILY
Qty: 30 | Refills: 3 | Status: ACTIVE | COMMUNITY
Start: 2022-08-05 | End: 1900-01-01

## 2022-08-26 ENCOUNTER — APPOINTMENT (OUTPATIENT)
Dept: NEUROLOGY | Facility: CLINIC | Age: 72
End: 2022-08-26

## 2022-10-07 ENCOUNTER — APPOINTMENT (OUTPATIENT)
Dept: NEUROLOGY | Facility: CLINIC | Age: 72
End: 2022-10-07

## 2022-10-07 DIAGNOSIS — M62.838 OTHER MUSCLE SPASM: ICD-10-CM

## 2022-10-07 PROCEDURE — 99214 OFFICE O/P EST MOD 30 MIN: CPT | Mod: 95

## 2022-10-07 NOTE — HISTORY OF PRESENT ILLNESS
[Home] : at home, [unfilled] , at the time of the visit. [Medical Office: ([facility])___] : at the medical office located in  [Verbal consent obtained from patient] : the patient, [unfilled] [FreeTextEntry1] : Intial hx 7/2022\par 4/2022 - BL LE weakness. found to have LETM. CSF w/ ~120wbcs and +OCBs. Got IVMP and 7 sessions of PLEX.\par 6/2022 - Has been at [place] x4wks.\par Has been standing and walking with walker. \par Numbness and tingling has diminished somewhat. Numbness above bellybutton has improved; used to be numb from nipple-line down. Feels the floor in his toes now.\par No pain.\par Went to [facility]par Home by 7/2022.\par \par \par \par Subj interval:\par \par Has started outpatient PT and home PT. Starting the SCIP program at [place]. Uses his home gym 1h/day.\par \par Using walker on his own only when getting in/out of bed. Using a cane most of the time. Can walk up and down stairs on his own.\par \par Bladder dysfunction - improving, frequency and hesitancy but no longer cath'ing. wakes up 3x/night to urinate.\par \par Bowel - mini-enemas daily, stable.\par \par DVTs are no longer on US. Planned to wean off eliquis as per his PMD.\par \par Occ leg spasms, very fleeting. mild and doesn't interfere with sleep. not painful.\par \par Occ "whole body spasm", cramping, also fleeting. Happens every morning.\par \par \par PMHX:\par myelitis\par DVT 5/2022\par \par MEDS:\par flomax\par eliquis for DVT\par B12\par antidepressant\par fleets\par \par Exam:\par AO3. Normally conversant. Follows commands, names, and repeats. Good attention.\par moves both arms well.\par \par \par 5/2022\par negative NMO, MOG, and autoimmune myelopathy panel.\par \par CSF 4/2022\par .9\par protein 80\par gluc wnl\par +OCBs\par \par \par MRI brain 4/2022 - a few nonspecific t2h that are normal for age.\par \par MRI C+T 4/2022 - likely patchy cord signal change throughout, nonenhancing.\par \par \par AP: 72yo w/ likely idiopathic transverse myelitis in 4/2022, s/p IVMP, PLEX, and long prednisone taper. Substantial early improvement.\par \par all questions answered, education provided, management discussed at length. chart reviewed.\par \par - cont PT\par - advised GI f/u for pancreatic lesion - he is scheduled to see a new one in 12/2022.\par - cont bladder and bowel training\par - trial of baclofen 10-20mg qhs for painful muscle spasms\par - RTC 3m

## 2022-10-26 NOTE — CHART NOTE - NSCHARTNOTEFT_GEN_A_CORE
Patient is seen and examined bedside. GI consult appreciated. Discussed with Neurology Attending Dr. Bolton. There is no contra-indication from neurological point of view for patient to undergo sedation for EGD/EUS for biopsy of pancreatic mass. Valtrex Pregnancy And Lactation Text: this medication is Pregnancy Category B and is considered safe during pregnancy. This medication is not directly found in breast milk but it's metabolite acyclovir is present.

## 2023-01-13 ENCOUNTER — NON-APPOINTMENT (OUTPATIENT)
Age: 73
End: 2023-01-13

## 2023-01-13 ENCOUNTER — APPOINTMENT (OUTPATIENT)
Dept: NEUROLOGY | Facility: CLINIC | Age: 73
End: 2023-01-13
Payer: MEDICARE

## 2023-01-13 VITALS
WEIGHT: 170 LBS | BODY MASS INDEX: 25.18 KG/M2 | TEMPERATURE: 97.2 F | HEIGHT: 69 IN | DIASTOLIC BLOOD PRESSURE: 78 MMHG | SYSTOLIC BLOOD PRESSURE: 127 MMHG | OXYGEN SATURATION: 96 % | HEART RATE: 79 BPM

## 2023-01-13 PROCEDURE — 99215 OFFICE O/P EST HI 40 MIN: CPT

## 2023-01-13 RX ORDER — BACLOFEN 10 MG/1
10 TABLET ORAL AT BEDTIME
Qty: 60 | Refills: 1 | Status: DISCONTINUED | COMMUNITY
Start: 2022-10-07 | End: 2023-01-13

## 2023-01-13 RX ORDER — CARBAMAZEPINE 100 MG/1
100 CAPSULE, EXTENDED RELEASE ORAL
Qty: 60 | Refills: 5 | Status: ACTIVE | COMMUNITY
Start: 2023-01-13 | End: 1900-01-01

## 2023-01-13 RX ORDER — APIXABAN 5 MG/1
5 TABLET, FILM COATED ORAL
Qty: 60 | Refills: 1 | Status: DISCONTINUED | COMMUNITY
Start: 2022-08-05 | End: 2023-01-13

## 2023-01-17 NOTE — HISTORY OF PRESENT ILLNESS
[FreeTextEntry1] : Intial hx 7/2022\par 4/2022 - BL LE weakness. found to have LETM. CSF w/ ~120wbcs and +OCBs. Got IVMP and 7 sessions of PLEX.\par 6/2022 - Has been at [place] x4wks.\par Has been standing and walking with walker. \par Numbness and tingling has diminished somewhat. Numbness above bellybutton has improved; used to be numb from nipple-line down. Feels the floor in his toes now.\par No pain.\par Went to [facility].\par Home by 7/2022.\par Finished SCIP program at [facility].\par \par \par Subj interval:\par \par Had some LBP. Baclofen seemed to help. \par \par Morning "whole body spasm", cramping, also fleeting. Happens every morning. Painful. Baclofen didn't help. \par \par Has been able to job at [employer], now finished. Still with home PT and then does his own exercises.  Uses his home gym 1h/day.\par \par Walks around the house and up/down stairs without assistance. Uses cane for long distance or in new places.\par \par Bladder dysfunction - improving, frequency and hesitancy. wakes up 1-4x/night to urinate.\par Bowel - mini-enemas daily, stable.\par \par \par PMHX:\par myelitis\par DVT 5/2022\par \par MEDS:\par flomax\par B12\par escitalopram\par mini-fleets\par linzess\par \par \par Exam:\par \par AO3. Normally conversant. Follows commands, names, and repeats. Good attention.\par CN intact\par 5/5 intact\par sens with mild VBS on R foot, mod VBS loss on L foot.\par dtrs - brisk in both LEs, 4+ AJs BL\par coord - FTN and Denny intact.\par \par \par 5/2022\par negative NMO, MOG, and autoimmune myelopathy panel.\par \par \par CSF 4/2022\par .9\par protein 80\par gluc wnl\par +OCBs\par \par \par MRI brain 4/2022 - a few nonspecific t2h that are normal for age.\par \par MRI C+T 4/2022 - likely patchy cord signal change throughout, nonenhancing.\par \par \par AP: 73yo w/ likely idiopathic transverse myelitis in 4/2022, s/p IVMP, PLEX, and long prednisone taper. Substantial early improvement.\par \par all questions answered, education provided, management discussed at length. chart reviewed.\par \par - cont PT\par - advised GI f/u for pancreatic lesion - has been undergoing work up.\par - cont bladder and bowel training\par - see Dr. Blackburn for 2nd opinion for neurogenic bladder\par - trial of tegretol 100bid, inc to 200bid as tolerated\par - RTC 3m

## 2023-01-31 ENCOUNTER — APPOINTMENT (OUTPATIENT)
Dept: UROLOGY | Facility: CLINIC | Age: 73
End: 2023-01-31
Payer: MEDICARE

## 2023-01-31 VITALS
HEART RATE: 62 BPM | DIASTOLIC BLOOD PRESSURE: 75 MMHG | SYSTOLIC BLOOD PRESSURE: 126 MMHG | OXYGEN SATURATION: 99 % | TEMPERATURE: 97.8 F

## 2023-01-31 VITALS
TEMPERATURE: 98 F | OXYGEN SATURATION: 99 % | SYSTOLIC BLOOD PRESSURE: 126 MMHG | HEART RATE: 62 BPM | DIASTOLIC BLOOD PRESSURE: 75 MMHG

## 2023-01-31 DIAGNOSIS — Z78.9 OTHER SPECIFIED HEALTH STATUS: ICD-10-CM

## 2023-01-31 PROCEDURE — 99204 OFFICE O/P NEW MOD 45 MIN: CPT

## 2023-01-31 NOTE — ASSESSMENT
[FreeTextEntry1] : \par \par Impression/plan: 72 year-old gentleman with transverse myelitis, BPH, incomplete bladder emptying. \par \par  ml\par \par 1. UCx: r/o UTI. \par 2. F/u UDS: assess bladder function. \par \par I, Dr. Maggie Blackburn, personally performed the evaluation and management (E/M) services for this new patient.  That E/M includes conducting the clinically appropriate initial history &/or exam, assessing all conditions, and establishing the plan of care.  Today, my CAMILLE Lilia Utmoisejohn, was here to observe &/or participate in the visit & follow plan of care established by me.\par \par \par \par

## 2023-01-31 NOTE — HISTORY OF PRESENT ILLNESS
[FreeTextEntry1] : 72 year-old gentleman presents to the office for a second opinion. Patient was diagnosed with transverse myelitis April 2022. Patient reports nocturia 2-3x. patient had a nation in for a while when he was hospitalized. was caught CIC and weaned himself off of CIC during his time at subacute rehab. has some difficulty urinating. patient gets a "zing" when he needs to empty out. Patient is on 0.8 mg Tamsulosin at nighttime. He reports that he did not have any urinary issue prior to the Transverse myelitis diagnosis. \par Previous urologist Dr. abebe.\par PSA 5.2 (11/22)\par \par  ml\par \par Force of stream: usually a weak stream\par Hesitancy: yes\par intermittency: yes\par Dribbling: yes\par Daytime frequency: 3-4x\par Nighttime frequency: 2-3x\par Dysuria: slight twinge, discomfort\par Urgency: no\par UUI: no\par MAL: no\par Pad usage: no\par Straining to void: applies pressure on lower abdomen\par Incomplete bladder emptying: yes\par UTI: one\par Hematuria: no\par Stone disease: kidney (20 years ago passed by self)\par STD: no\par Bowel Issue: enema every morning for months. now doing at night\par Fluid intake: water: 2/3 of 750 ml; coffee: 1 cup; tea 1 cup; 1 small cup of orange juice; martini once a week. wine\par \par \par

## 2023-01-31 NOTE — PHYSICAL EXAM
[General Appearance - Well Developed] : well developed [General Appearance - Well Nourished] : well nourished [Normal Appearance] : normal appearance [Well Groomed] : well groomed [General Appearance - In No Acute Distress] : no acute distress [Edema] : no peripheral edema [Respiration, Rhythm And Depth] : normal respiratory rhythm and effort [Exaggerated Use Of Accessory Muscles For Inspiration] : no accessory muscle use [Abdomen Soft] : soft [Abdomen Tenderness] : non-tender [Costovertebral Angle Tenderness] : no ~M costovertebral angle tenderness [] : no rash [Oriented To Time, Place, And Person] : oriented to person, place, and time [Affect] : the affect was normal [Mood] : the mood was normal [Not Anxious] : not anxious [Prostate Tenderness] : the prostate was not tender [No Prostate Nodules] : no prostate nodules [Prostate Size ___ gm] : prostate size [unfilled] gm [FreeTextEntry1] : transverse myelitis

## 2023-02-06 ENCOUNTER — NON-APPOINTMENT (OUTPATIENT)
Age: 73
End: 2023-02-06

## 2023-02-06 LAB — BACTERIA UR CULT: NORMAL

## 2023-03-08 ENCOUNTER — APPOINTMENT (OUTPATIENT)
Dept: UROLOGY | Facility: CLINIC | Age: 73
End: 2023-03-08
Payer: MEDICARE

## 2023-03-08 VITALS
OXYGEN SATURATION: 98 % | HEART RATE: 63 BPM | TEMPERATURE: 98.5 F | SYSTOLIC BLOOD PRESSURE: 148 MMHG | DIASTOLIC BLOOD PRESSURE: 66 MMHG

## 2023-03-08 LAB
BILIRUB UR QL STRIP: NORMAL
CLARITY UR: CLEAR
COLLECTION METHOD: NORMAL
GLUCOSE UR-MCNC: NORMAL
HCG UR QL: 0.2 EU/DL
HGB UR QL STRIP.AUTO: NORMAL
KETONES UR-MCNC: NORMAL
LEUKOCYTE ESTERASE UR QL STRIP: NORMAL
NITRITE UR QL STRIP: NORMAL
PH UR STRIP: 7
PROT UR STRIP-MCNC: NORMAL
SP GR UR STRIP: 1.02

## 2023-03-08 PROCEDURE — 51741 ELECTRO-UROFLOWMETRY FIRST: CPT

## 2023-03-08 PROCEDURE — 51728 CYSTOMETROGRAM W/VP: CPT

## 2023-03-08 PROCEDURE — 81003 URINALYSIS AUTO W/O SCOPE: CPT | Mod: QW

## 2023-03-08 PROCEDURE — 51798 US URINE CAPACITY MEASURE: CPT | Mod: 59

## 2023-03-08 PROCEDURE — 99215 OFFICE O/P EST HI 40 MIN: CPT | Mod: 25

## 2023-03-08 PROCEDURE — 51797 INTRAABDOMINAL PRESSURE TEST: CPT

## 2023-03-08 PROCEDURE — 51784 ANAL/URINARY MUSCLE STUDY: CPT

## 2023-03-09 NOTE — ASSESSMENT
[FreeTextEntry1] : \par \par Impression/plan:  72 year-old gentleman with transverse myelitis, BPH, incomplete bladder emptying.  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  We reviewed the urodynamics today at length, and my recommendation for the patient in terms of improving his symptoms would be for some type of outlet procedure.  We did briefly discuss all of the different types of procedures from minimally to maximally invasive.  All questions answered.  I have referred him to Dr. Kim for consultation and discussion of definitive management.\par

## 2023-03-09 NOTE — HISTORY OF PRESENT ILLNESS
[FreeTextEntry1] : 72 year-old gentleman presents to the office for a second opinion. Patient was diagnosed with transverse myelitis April 2022. Patient reports nocturia 2-3x. patient had a nation in for a while when he was hospitalized. was caught CIC and weaned himself off of CIC during his time at subacute rehab. has some difficulty urinating. patient gets a "zing" when he needs to empty out. Patient is on 0.8 mg Tamsulosin at nighttime. He reports that he did not have any urinary issue prior to the Transverse myelitis diagnosis. \par Previous urologist Dr. abebe.\par PSA 5.2 (11/22)\par \par  ml\par \par Force of stream: usually a weak stream\par Hesitancy: yes\par intermittency: yes\par Dribbling: yes\par Daytime frequency: 3-4x\par Nighttime frequency: 2-3x\par Dysuria: slight twinge, discomfort\par Urgency: no\par UUI: no\par MAL: no\par Pad usage: no\par Straining to void: applies pressure on lower abdomen\par Incomplete bladder emptying: yes\par UTI: one\par Hematuria: no\par Stone disease: kidney (20 years ago passed by self)\par STD: no\par Bowel Issue: enema every morning for months. now doing at night\par Fluid intake: water: 2/3 of 750 ml; coffee: 1 cup; tea 1 cup; 1 small cup of orange juice; martini once a week. wine\par \par 3/8/23\par \par Patient is here today for his urodynamic study, review of results, and discussion of treatment options.  His symptoms persist despite being on double dose alpha-blocker therapy.  He denies any interval change in medical surgical history since his last office visit.\par \par UDS - \par \par Filling/Storage Phase: Cystometric capacity 309 mL. Involuntary contractions were present. Compliance: normal. EMG Activity: normal. \par Additional Comments: Sudden urge to urinate at volume of 309 ml. DO noted and then patient was given the permission to void. \par \par Voiding Phase: Qmax 3.8 mL/s , Pdet at Qmax 70 cm/H20, Qmax at Pdet 0.1 mL/s, Pavg 91.1 cm/H20, Qavg 1.9 mL/s, voiding time 39.5 mm:sec, voided volume 60 mL and post void residual 249 mL. EMG activity was synergic. \par \par Urodynamic Interpretation : \par Normal bladder sensation. Normal bladder capacity. Patient experiencing detrusor instability. Pt has an appropriate EMG response to involuntary contractions:. The uroflow rate is decreased. The uroflow pattern is plateaued. The detrusor contractility is normal. The patient has bladder outlet obstruction. The intravesical voiding pressure is increased. The patient has incomplete bladder emptying, a post void residual of 249 cc. The spincter activity is normal synergic. \par Additional Procedure Related Findings/Comments: BOOI: 62.4. \par \par

## 2023-03-17 ENCOUNTER — APPOINTMENT (OUTPATIENT)
Dept: UROLOGY | Facility: CLINIC | Age: 73
End: 2023-03-17
Payer: MEDICARE

## 2023-03-17 DIAGNOSIS — N40.1 BENIGN PROSTATIC HYPERPLASIA WITH LOWER URINARY TRACT SYMPMS: ICD-10-CM

## 2023-03-17 DIAGNOSIS — R33.9 RETENTION OF URINE, UNSPECIFIED: ICD-10-CM

## 2023-03-17 DIAGNOSIS — N31.9 NEUROMUSCULAR DYSFUNCTION OF BLADDER, UNSPECIFIED: ICD-10-CM

## 2023-03-17 DIAGNOSIS — N13.8 BENIGN PROSTATIC HYPERPLASIA WITH LOWER URINARY TRACT SYMPMS: ICD-10-CM

## 2023-03-17 PROCEDURE — 99215 OFFICE O/P EST HI 40 MIN: CPT | Mod: 95

## 2023-03-17 NOTE — HISTORY OF PRESENT ILLNESS
[Home] : at home, [unfilled] , at the time of the visit. [Medical Office: ([facility])___] : at the medical office located in  [Verbal consent obtained from patient] : the patient, [unfilled] [FreeTextEntry1] : 72 year-old gentleman presents to the office for a second opinion. Patient was diagnosed with transverse myelitis April 2022. Patient reports nocturia 2-3x. patient had a nation in for a while when he was hospitalized. was caught CIC and weaned himself off of CIC during his time at subacute rehab. has some difficulty urinating. patient gets a "zing" when he needs to empty out. Patient is on 0.8 mg Tamsulosin at nighttime. He reports that he did not have any urinary issue prior to the Transverse myelitis diagnosis. \par Previous urologist Dr. abebe.\par PSA 5.2 (11/22)\par \par  ml\par \par Force of stream: usually a weak stream\par Hesitancy: yes\par intermittency: yes\par Dribbling: yes\par Daytime frequency: 3-4x\par Nighttime frequency: 2-3x\par Dysuria: slight twinge, discomfort\par Urgency: no\par UUI: no\par MAL: no\par Pad usage: no\par Straining to void: applies pressure on lower abdomen\par Incomplete bladder emptying: yes\par UTI: one\par Hematuria: no\par Stone disease: kidney (20 years ago passed by self)\par STD: no\par Bowel Issue: enema every morning for months. now doing at night\par Fluid intake: water: 2/3 of 750 ml; coffee: 1 cup; tea 1 cup; 1 small cup of orange juice; martini once a week. wine\par \par 3/8/23\par \par Patient is here today for his urodynamic study, review of results, and discussion of treatment options. His symptoms persist despite being on double dose alpha-blocker therapy. He denies any interval change in medical surgical history since his last office visit.\par \par UDS - \par \par Filling/Storage Phase: Cystometric capacity 309 mL. Involuntary contractions were present. Compliance: normal. EMG Activity: normal. \par Additional Comments: Sudden urge to urinate at volume of 309 ml. DO noted and then patient was given the permission to void. \par \par Voiding Phase: Qmax 3.8 mL/s , Pdet at Qmax 70 cm/H20, Qmax at Pdet 0.1 mL/s, Pavg 91.1 cm/H20, Qavg 1.9 mL/s, voiding time 39.5 mm:sec, voided volume 60 mL and post void residual 249 mL. EMG activity was synergic. \par \par Urodynamic Interpretation : \par Normal bladder sensation. Normal bladder capacity. Patient experiencing detrusor instability. Pt has an appropriate EMG response to involuntary contractions:. The uroflow rate is decreased. The uroflow pattern is plateaued. The detrusor contractility is normal. The patient has bladder outlet obstruction. The intravesical voiding pressure is increased. The patient has incomplete bladder emptying, a post void residual of 249 cc. The spincter activity is normal synergic. \par Additional Procedure Related Findings/Comments: BOOI: 62.4. \par \par \par 3/17/23: referred by Dr. Blackburn for consideration of prostate debulking procedure. He has ongoing issues related to transverse myelitis, though symptoms have significantly improved from prior and he feels they are continuing to improve at a slower rate. He has saddle anesthesia, bowel constipation, and urinary issues that are ongoing. He does not self-catheterize anymore. He primarily voids with valsalva and crude.\par \par

## 2023-03-17 NOTE — ASSESSMENT
[FreeTextEntry1] : Impression/plan: 72 year-old gentleman with transverse myelitis, BPH, incomplete bladder emptying. 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 He is referred by Dr. Blackburn for consideration of prostate debulking procedure.\par \par We discussed patient's current condition overall. He has made significant improvement in his recovery from transverse myelitis, however, rate of improvement as slowed. He is hopeful to continue to see improvement. Overall, he remains depdendent on enemas for bowel movements and he voids by valsalva and crude.\par \par His UDS did demonstrate bladder contraction and elevated voiding pressures consistent with bladder outlet obstruction. We discussed that a prostate debulking procedure would likely make it easier for him to urinate. However, I am concerned that he has very little bladder sensation at this time despite significant bladder contraction. I am concerned that a prostate debulking procedure may leave him with significant urge incontinence and it is difficult to estimate the likelihood of this happening. He is very hesitant to perform a procedure that may leave him incontinent and would prefer to allow more time for possible bladder and sensation recovery. I think this would be reasonable at this time. \par \par I suggested that he self-catheterize prior to bedtime for more effective emptying that may help him sleep through the night. He will consider this. \par  - Follow up in 3 months\par

## 2023-05-03 ENCOUNTER — APPOINTMENT (OUTPATIENT)
Dept: NEUROLOGY | Facility: CLINIC | Age: 73
End: 2023-05-03
Payer: MEDICARE

## 2023-05-03 PROCEDURE — 99214 OFFICE O/P EST MOD 30 MIN: CPT | Mod: 95

## 2023-05-03 NOTE — HISTORY OF PRESENT ILLNESS
[Home] : at home, [unfilled] , at the time of the visit. [Medical Office: ([facility])___] : at the medical office located in  [Verbal consent obtained from patient] : the patient, [unfilled] [FreeTextEntry1] : Intial hx 7/2022\par 4/2022 - BL LE weakness. found to have LETM. CSF w/ ~120wbcs and +OCBs. Got IVMP and 7 sessions of PLEX.\par 6/2022 - Has been at [place] x4wks.\par Has been standing and walking with walker. \par Numbness and tingling has diminished somewhat. Numbness above bellybutton has improved; used to be numb from nipple-line down. Feels the floor in his toes now.\par No pain.\par Went to [facility].\par Home by 7/2022.\par Finished SCIP program at [facility].\par 2023 - pancreatic lesion was reportedly felt to be benign after work up. \par \par \par Subj interval:\par \par Feels like he is slowly improving. \par \par Had some LBP. Baclofen seemed to help. \par \par Persistent "whole body spasm", intense, cramping, also fleeting. Happens every morning. Painful. Baclofen didn't help. Trial of tegretol reportedly didn't help either. Has developed a strategy to attenuate this spasm.\par \par Has been able to spend time at [facility], now finished. Still with home PT once weekly and then does his own exercises. Uses his home gym 1h/day. Walks about a mile without rest. \par \par Walks around the house and up/down stairs without assistance. Walks with cane for long distance or in new places, but can walk outside without it.\par \par Bladder dysfunction - improving, frequency and hesitancy. wakes up 1-2x/night to urinate.\par \par Bowel - mini-enemas daily, stable.\par \par \par PMHX:\par myelitis\par DVT 5/2022\par \par MEDS:\par flomax\par B12\par escitalopram\par mini-fleets\par linzess\par \par \par Exam:\par \par AO3. Normally conversant. Follows commands, names, and repeats. Good attention.\par \par \par 5/2022\par negative NMO, MOG, and autoimmune myelopathy panel.\par \par CSF 4/2022\par .9\par protein 80\par gluc wnl\par +OCBs\par \par \par MRI brain 4/2022 - a few nonspecific t2h that are normal for age.\par \par MRI C+T 4/2022 - likely patchy cord signal change throughout, nonenhancing.\par \par \par AP: 73yo w/ likely idiopathic transverse myelitis in 4/2022, s/p IVMP, PLEX, and long prednisone taper. Substantial early improvement.\par \par all questions answered, education provided, management discussed at length. chart reviewed.\par \par - cont PT\par - cont bladder and bowel training\par - f/u with urology for neurogenic bladder, BPH\par - RTC 3m

## 2023-08-25 ENCOUNTER — APPOINTMENT (OUTPATIENT)
Dept: NEUROLOGY | Facility: CLINIC | Age: 73
End: 2023-08-25
Payer: MEDICARE

## 2023-08-25 PROCEDURE — 99214 OFFICE O/P EST MOD 30 MIN: CPT | Mod: 95

## 2023-08-25 NOTE — HISTORY OF PRESENT ILLNESS
[Home] : at home, [unfilled] , at the time of the visit. [Medical Office: ([facility])___] : at the medical office located in  [Verbal consent obtained from patient] : the patient, [unfilled] [FreeTextEntry1] : Intial hx 7/2022 4/2022 - BL LE weakness. found to have LETM. CSF w/ ~120wbcs and +OCBs. Got IVMP and 7 sessions of PLEX. 6/2022 - Has been at [place] x4wks. Has been standing and walking with walker. Numbness and tingling has diminished somewhat. Numbness above bellybutton has improved; used to be numb from nipple-line down. Feels the floor in his toes now. No pain. Went to [facility]. Home by 7/2022. Finished SCIP program at [place]. 2023 - pancreatic lesion was reportedly felt to be benign after work up.   Subj interval:  Feels like he is slowly improving.  Had some LBP. Baclofen seemed to help.  Persistent morning "whole body spasm", intense, cramping, also fleeting. Happens every morning. Painful. Baclofen didn't help. Trial of tegretol reportedly didn't help either. Has developed a strategy to attenuate this spasm.  1-2 months of random intermittent jolts in either foot, fleeting, sporadic and not every day.   Chronic intermittent cold sensation down either calf, fleeting and sporadic. No discoloration. Leg not cold to the touch.  Still doing his own exercises. Uses his home gym 1h/day. Walks about 2 miles without rest (used to be 1 mile).  Walks around the house and up/downstairs without assistance. Walks with cane for very long distance or in new places, but can walk outside without it.  Bladder dysfunction - improving, frequency and hesitancy. wakes up 1-2x/night to urinate.  Bowel - mini-enemas daily, stable.   PMHX: myelitis DVT 5/2022  MEDS: flomax B12 escitalopram mini-fleets linzess   Exam:  AO3. Normally conversant. Follows commands, names, and repeats. Good attention. face symm   5/2022 negative NMO, MOG, and autoimmune myelopathy panel.   CSF 4/2022 .9 protein 80 gluc wnl +OCBs   MRI brain 4/2022 - a few nonspecific t2h that are normal for age.  MRI C+T 4/2022 - likely patchy cord signal change throughout, nonenhancing.   AP: 73yo w/ likely idiopathic transverse myelitis in 4/2022, s/p IVMP, PLEX, and long prednisone taper. Substantial early improvement and continued improvement even later on.   Doubt that very sporadic and fleeting paresthesias are indicative of new inflammatory disease given that his walking is improving if anything. will monitor.  all questions answered, education provided, management discussed at length. chart reviewed.  - cont exercises - cont bladder and bowel training - f/u with urology for neurogenic bladder, BPH - RTC 6m, sooner prn.  advised in person is ideal

## 2023-12-20 NOTE — CONSULT NOTE ADULT - ASSESSMENT
72 yo M with no significant PMHx, transferred from [facility] for bilateral lower extremity weakness, with working diagnosis of thoracic transverse myelitis, s/p lumbar puncture at [facility], failed high-dose steroids and expected to have minimal-no response to IVIG per Neurology. Incidentally found ot have a cystic pancreatic head lesion (2.2 x 2.4 x 3.1 cm), suspected IPMN, with partial compression of portal vein and superior aspect of SMV with non-specific colitis of ascending transverse and upper half of descending colon. MRCP (04/16/22) showed a complex cystic pancreatic head lesion, either IPMN or atypical serous cystadenoma, with recommendation for EUS and biopsy sampling. Hematology consulted for initiation of PLEX on 04/20/22 per Neurology.    #) DIAGNOSIS  - Steroid- and IVIG-refractory thoracic transverse myelitis  - Complex cystic pancreatic head mass     #) PLAN  - Per Neurology, planning for PLEX on 04/20/22. Will need HD catheter placed. Patient plasma volume calculated to be 3248 cc. Planning to replace with 5% albumin.   - Risks and benefits of procedure were described to patient. Patient verbalized understanding.  - Send serum fibrinogen. Repeat PT/PTT. Send CEA, CA 19-9  - Will need to trend CBC with differential, CMP and fibrinogen once daily while on PLEX  - Will need to follow-up with GI for eventual biopsy of the pancreatic head lesion.   - Maintain active T+S, transfuse if Hb < 7 or if actively bleeding     To discuss with Dr. Anne 70 yo M with no significant PMHx, transferred from [facility] for bilateral lower extremity weakness, with working diagnosis of thoracic transverse myelitis, s/p lumbar puncture at [facility], failed high-dose steroids and expected to have minimal-no response to IVIG per Neurology. Incidentally found ot have a cystic pancreatic head lesion (2.2 x 2.4 x 3.1 cm), suspected IPMN, with partial compression of portal vein and superior aspect of SMV with non-specific colitis of ascending transverse and upper half of descending colon. MRCP (04/16/22) showed a complex cystic pancreatic head lesion, either IPMN or atypical serous cystadenoma, with recommendation for EUS and biopsy sampling. Hematology consulted for initiation of PLEX on 04/20/22 per Neurology.    #) DIAGNOSIS  - Steroid- and IVIG-refractory thoracic transverse myelitis  - Complex cystic pancreatic head mass     #) PLAN  - Per Neurology, planning for PLEX on 04/20/22. Will need HD catheter placed. Patient plasma volume calculated to be 3248 cc. Planning to replace with 5% albumin.   - Risks and benefits of procedure were described to patient. Side effects not limited to perioral paresthesia, hypotension, anaphylaxis, electrolyte disturbance, or bleeding; patient verbalized understanding.  - Send serum fibrinogen. Repeat PT/PTT. Send CEA, CA 19-9  - Will need to trend CBC with differential, CMP and fibrinogen once daily while on PLEX  - Will need to follow-up with GI for eventual biopsy of the pancreatic head lesion.   - Maintain active T+S, transfuse if Hb < 7 or if actively bleeding     To discuss with Dr. Anne 72 yo M with no significant PMHx, transferred from [facility] for bilateral lower extremity weakness, with working diagnosis of thoracic transverse myelitis, s/p lumbar puncture at [facility], failed high-dose steroids and expected to have minimal-no response to IVIG per Neurology. Incidentally found ot have a cystic pancreatic head lesion (2.2 x 2.4 x 3.1 cm), suspected IPMN, with partial compression of portal vein and superior aspect of SMV with non-specific colitis of ascending transverse and upper half of descending colon. MRCP (04/16/22) showed a complex cystic pancreatic head lesion, either IPMN or atypical serous cystadenoma, with recommendation for EUS and biopsy sampling. Hematology consulted for initiation of PLEX on 04/20/22 per Neurology.    #) DIAGNOSIS  - Steroid- and IVIG-refractory thoracic transverse myelitis  - Complex cystic pancreatic head mass     #) PLAN  - Per Neurology, planning for PLEX on 04/20/22. Will need HD catheter placed. Patient plasma volume calculated to be 3248 cc. Planning to replace with 5% albumin.   - Risks and benefits of procedure were described to patient. Side effects not limited to perioral paresthesia, hypotension, anaphylaxis, electrolyte disturbance, or bleeding; patient verbalized understanding.  - Send serum fibrinogen. Repeat PT/PTT. Send CEA, CA 19-9  - Will need to trend CBC with differential, CMP once daily. Will need to trend PT/PTT and fibrinogen once every other day while on PLEX  - Will need to follow-up with GI for eventual biopsy of the pancreatic head lesion.   - Maintain active T+S, transfuse if Hb < 7 or if actively bleeding     Discussed with Dr. Anne 72 yo M with no significant PMHx, transferred from [facility] for bilateral lower extremity weakness, with working diagnosis of thoracic transverse myelitis, s/p lumbar puncture at [facility], failed high-dose steroids and expected to have minimal-no response to IVIG per Neurology. Incidentally found ot have a cystic pancreatic head lesion (2.2 x 2.4 x 3.1 cm), suspected IPMN, with partial compression of portal vein and superior aspect of SMV with non-specific colitis of ascending transverse and upper half of descending colon. MRCP (04/16/22) showed a complex cystic pancreatic head lesion, either IPMN or atypical serous cystadenoma, with recommendation for EUS and biopsy sampling. Hematology consulted for initiation of PLEX on 04/20/22 per Neurology.    #) DIAGNOSIS  - Steroid- and IVIG-refractory thoracic transverse myelitis  - Complex cystic pancreatic head mass     #) PLAN  - Per Neurology, planning for 5 sessions of PLEX on 04/20/22. Will need HD catheter placed. Patient plasma volume calculated to be 3248 cc. Planning to replace with 5% albumin. Plasma exchange sessions will be every other day starting on 04/20/22.   - Risks and benefits of procedure were described to patient. Side effects not limited to perioral paresthesia, hypotension, anaphylaxis, electrolyte disturbance, or bleeding; patient verbalized understanding.  - Send serum fibrinogen. Repeat PT/PTT. Send CEA, CA 19-9  - Will need to trend CBC with differential, CMP once daily. Will need to trend PT/PTT and fibrinogen once every other day while on PLEX  - Will need to follow-up with GI for eventual biopsy of the pancreatic head lesion.   - Maintain active T+S, transfuse if Hb < 7 or if actively bleeding     Discussed with Dr. Anne Statement Selected No

## 2024-08-12 ENCOUNTER — RX RENEWAL (OUTPATIENT)
Age: 74
End: 2024-08-12
